# Patient Record
Sex: FEMALE | Race: WHITE | Employment: FULL TIME | URBAN - METROPOLITAN AREA
[De-identification: names, ages, dates, MRNs, and addresses within clinical notes are randomized per-mention and may not be internally consistent; named-entity substitution may affect disease eponyms.]

---

## 2017-01-10 ENCOUNTER — OFFICE VISIT (OUTPATIENT)
Dept: FAMILY MEDICINE CLINIC | Age: 52
End: 2017-01-10

## 2017-01-10 VITALS
TEMPERATURE: 97.9 F | DIASTOLIC BLOOD PRESSURE: 82 MMHG | WEIGHT: 110.4 LBS | BODY MASS INDEX: 19.56 KG/M2 | SYSTOLIC BLOOD PRESSURE: 112 MMHG

## 2017-01-10 DIAGNOSIS — Z01.84 IMMUNITY STATUS TESTING: ICD-10-CM

## 2017-01-10 DIAGNOSIS — R03.0 ELEVATED BLOOD PRESSURE READING WITHOUT DIAGNOSIS OF HYPERTENSION: ICD-10-CM

## 2017-01-10 DIAGNOSIS — Z11.1 PPD SCREENING TEST: ICD-10-CM

## 2017-01-10 DIAGNOSIS — F32.89 DEPRESSIVE DISORDER, NOT ELSEWHERE CLASSIFIED: Primary | ICD-10-CM

## 2017-01-10 LAB
HBV SURFACE AB TITR SER: <3.5 MUL/ML
RUBELLA ANTIBODY IGG: 170.2 IU/ML

## 2017-01-10 PROCEDURE — 86580 TB INTRADERMAL TEST: CPT | Performed by: FAMILY MEDICINE

## 2017-01-10 PROCEDURE — 99214 OFFICE O/P EST MOD 30 MIN: CPT | Performed by: FAMILY MEDICINE

## 2017-01-10 ASSESSMENT — ENCOUNTER SYMPTOMS
EYES NEGATIVE: 1
GASTROINTESTINAL NEGATIVE: 1
RESPIRATORY NEGATIVE: 1

## 2017-01-12 LAB
MUV IGG SER QL: >300 AU/ML
RUBEOLA (MEASLES) AB IGG: 15.1 AU/ML
VZV IGG SER QL IA: 1678 IV

## 2017-01-17 ENCOUNTER — NURSE ONLY (OUTPATIENT)
Dept: FAMILY MEDICINE CLINIC | Age: 52
End: 2017-01-17

## 2017-01-17 DIAGNOSIS — Z11.1 ENCOUNTER FOR PPD TEST: Primary | ICD-10-CM

## 2017-01-17 PROCEDURE — 86580 TB INTRADERMAL TEST: CPT | Performed by: FAMILY MEDICINE

## 2017-01-19 ENCOUNTER — NURSE ONLY (OUTPATIENT)
Dept: FAMILY MEDICINE CLINIC | Age: 52
End: 2017-01-19

## 2017-01-19 DIAGNOSIS — Z11.1 ENCOUNTER FOR PPD SKIN TEST READING: Primary | ICD-10-CM

## 2017-01-31 ENCOUNTER — TELEPHONE (OUTPATIENT)
Dept: FAMILY MEDICINE CLINIC | Age: 52
End: 2017-01-31

## 2017-02-02 ENCOUNTER — NURSE ONLY (OUTPATIENT)
Dept: FAMILY MEDICINE CLINIC | Age: 52
End: 2017-02-02

## 2017-02-02 DIAGNOSIS — Z11.1 VISIT FOR TB SKIN TEST: ICD-10-CM

## 2017-02-02 DIAGNOSIS — Z23 NEED FOR MMR VACCINE: ICD-10-CM

## 2017-02-02 DIAGNOSIS — Z23 NEED FOR HEPATITIS B VACCINATION: Primary | ICD-10-CM

## 2017-02-02 PROCEDURE — 90707 MMR VACCINE SC: CPT | Performed by: FAMILY MEDICINE

## 2017-02-02 PROCEDURE — 90471 IMMUNIZATION ADMIN: CPT | Performed by: FAMILY MEDICINE

## 2017-02-02 PROCEDURE — 90746 HEPB VACCINE 3 DOSE ADULT IM: CPT | Performed by: FAMILY MEDICINE

## 2017-02-02 PROCEDURE — 90472 IMMUNIZATION ADMIN EACH ADD: CPT | Performed by: FAMILY MEDICINE

## 2017-02-02 PROCEDURE — 86580 TB INTRADERMAL TEST: CPT | Performed by: FAMILY MEDICINE

## 2017-02-03 ENCOUNTER — TELEPHONE (OUTPATIENT)
Dept: FAMILY MEDICINE CLINIC | Age: 52
End: 2017-02-03

## 2017-02-21 ENCOUNTER — OFFICE VISIT (OUTPATIENT)
Dept: FAMILY MEDICINE CLINIC | Age: 52
End: 2017-02-21

## 2017-02-21 VITALS
WEIGHT: 112.8 LBS | DIASTOLIC BLOOD PRESSURE: 78 MMHG | SYSTOLIC BLOOD PRESSURE: 136 MMHG | OXYGEN SATURATION: 100 % | HEART RATE: 58 BPM | BODY MASS INDEX: 19.98 KG/M2 | TEMPERATURE: 97.4 F

## 2017-02-21 DIAGNOSIS — Z01.84 IMMUNITY STATUS TESTING: ICD-10-CM

## 2017-02-21 DIAGNOSIS — J01.00 ACUTE NON-RECURRENT MAXILLARY SINUSITIS: Primary | ICD-10-CM

## 2017-02-21 PROCEDURE — 99213 OFFICE O/P EST LOW 20 MIN: CPT | Performed by: FAMILY MEDICINE

## 2017-02-21 RX ORDER — AZITHROMYCIN 250 MG/1
TABLET, FILM COATED ORAL
Qty: 1 PACKET | Refills: 0 | Status: SHIPPED | OUTPATIENT
Start: 2017-02-21 | End: 2018-01-08 | Stop reason: SDUPTHER

## 2017-02-21 RX ORDER — FLUCONAZOLE 150 MG/1
150 TABLET ORAL ONCE
Qty: 2 TABLET | Refills: 0 | Status: SHIPPED | OUTPATIENT
Start: 2017-02-21 | End: 2017-02-21

## 2017-02-21 ASSESSMENT — ENCOUNTER SYMPTOMS
EYES NEGATIVE: 1
RESPIRATORY NEGATIVE: 1
GASTROINTESTINAL NEGATIVE: 1

## 2017-03-02 ENCOUNTER — NURSE ONLY (OUTPATIENT)
Dept: FAMILY MEDICINE CLINIC | Age: 52
End: 2017-03-02

## 2017-03-02 DIAGNOSIS — Z23 NEED FOR HEPATITIS B VACCINATION: ICD-10-CM

## 2017-03-02 DIAGNOSIS — Z23 NEED FOR MMR VACCINE: Primary | ICD-10-CM

## 2017-03-02 PROCEDURE — 90746 HEPB VACCINE 3 DOSE ADULT IM: CPT | Performed by: FAMILY MEDICINE

## 2017-03-02 PROCEDURE — 90707 MMR VACCINE SC: CPT | Performed by: FAMILY MEDICINE

## 2017-03-02 PROCEDURE — 90471 IMMUNIZATION ADMIN: CPT | Performed by: FAMILY MEDICINE

## 2017-03-02 PROCEDURE — 90472 IMMUNIZATION ADMIN EACH ADD: CPT | Performed by: FAMILY MEDICINE

## 2017-03-23 ENCOUNTER — OFFICE VISIT (OUTPATIENT)
Dept: FAMILY MEDICINE CLINIC | Age: 52
End: 2017-03-23

## 2017-03-23 VITALS
SYSTOLIC BLOOD PRESSURE: 110 MMHG | DIASTOLIC BLOOD PRESSURE: 80 MMHG | OXYGEN SATURATION: 100 % | WEIGHT: 113.4 LBS | TEMPERATURE: 98 F | BODY MASS INDEX: 20.09 KG/M2 | HEART RATE: 66 BPM | HEIGHT: 63 IN

## 2017-03-23 DIAGNOSIS — M16.9 OSTEOARTHRITIS OF HIP, UNSPECIFIED LATERALITY, UNSPECIFIED OSTEOARTHRITIS TYPE: ICD-10-CM

## 2017-03-23 DIAGNOSIS — K58.9 IRRITABLE BOWEL SYNDROME WITHOUT DIARRHEA: ICD-10-CM

## 2017-03-23 DIAGNOSIS — H02.403 BLEPHAROPTOSIS, BILATERAL: ICD-10-CM

## 2017-03-23 DIAGNOSIS — M23.303 DERANGEMENT OF MEDIAL MENISCUS, RIGHT: ICD-10-CM

## 2017-03-23 DIAGNOSIS — Z82.49 FAMILY HISTORY OF CORONARY ARTERY DISEASE: ICD-10-CM

## 2017-03-23 DIAGNOSIS — F41.1 ANXIETY STATE: ICD-10-CM

## 2017-03-23 DIAGNOSIS — F32.89 DEPRESSIVE DISORDER, NOT ELSEWHERE CLASSIFIED: ICD-10-CM

## 2017-03-23 DIAGNOSIS — J30.1 NON-SEASONAL ALLERGIC RHINITIS DUE TO POLLEN: ICD-10-CM

## 2017-03-23 DIAGNOSIS — R79.89 ELEVATED HOMOCYSTEINE: ICD-10-CM

## 2017-03-23 DIAGNOSIS — Z01.818 PRE-OPERATIVE GENERAL PHYSICAL EXAMINATION: Primary | ICD-10-CM

## 2017-03-23 PROCEDURE — 99244 OFF/OP CNSLTJ NEW/EST MOD 40: CPT | Performed by: NURSE PRACTITIONER

## 2017-05-25 DIAGNOSIS — F41.1 ANXIETY STATE: ICD-10-CM

## 2017-05-25 RX ORDER — BUPROPION HYDROCHLORIDE 300 MG/1
300 TABLET ORAL EVERY MORNING
Qty: 90 TABLET | Refills: 1 | Status: SHIPPED | OUTPATIENT
Start: 2017-05-25 | End: 2017-11-18 | Stop reason: SDUPTHER

## 2017-06-08 ENCOUNTER — OFFICE VISIT (OUTPATIENT)
Dept: FAMILY MEDICINE CLINIC | Age: 52
End: 2017-06-08

## 2017-06-08 ENCOUNTER — HOSPITAL ENCOUNTER (OUTPATIENT)
Dept: MAMMOGRAPHY | Age: 52
Discharge: HOME | End: 2017-06-08
Attending: FAMILY MEDICINE | Admitting: FAMILY MEDICINE

## 2017-06-08 VITALS
DIASTOLIC BLOOD PRESSURE: 78 MMHG | SYSTOLIC BLOOD PRESSURE: 124 MMHG | TEMPERATURE: 97.9 F | BODY MASS INDEX: 20.02 KG/M2 | WEIGHT: 113 LBS

## 2017-06-08 DIAGNOSIS — Z12.11 SCREEN FOR COLON CANCER: ICD-10-CM

## 2017-06-08 DIAGNOSIS — Z12.31 SCREENING MAMMOGRAM, ENCOUNTER FOR: ICD-10-CM

## 2017-06-08 DIAGNOSIS — F98.8 ADD (ATTENTION DEFICIT DISORDER) WITHOUT HYPERACTIVITY: ICD-10-CM

## 2017-06-08 DIAGNOSIS — F41.1 ANXIETY STATE: Primary | ICD-10-CM

## 2017-06-08 DIAGNOSIS — E78.2 MIXED HYPERLIPIDEMIA: ICD-10-CM

## 2017-06-08 PROCEDURE — 99214 OFFICE O/P EST MOD 30 MIN: CPT | Performed by: FAMILY MEDICINE

## 2017-06-08 ASSESSMENT — ENCOUNTER SYMPTOMS
GASTROINTESTINAL NEGATIVE: 1
EYES NEGATIVE: 1
RESPIRATORY NEGATIVE: 1

## 2017-07-10 DIAGNOSIS — F98.8 ADD (ATTENTION DEFICIT DISORDER) WITHOUT HYPERACTIVITY: ICD-10-CM

## 2017-08-07 DIAGNOSIS — F98.8 ADD (ATTENTION DEFICIT DISORDER) WITHOUT HYPERACTIVITY: ICD-10-CM

## 2017-09-08 ENCOUNTER — OFFICE VISIT (OUTPATIENT)
Dept: FAMILY MEDICINE CLINIC | Age: 52
End: 2017-09-08

## 2017-09-08 VITALS
TEMPERATURE: 97.6 F | WEIGHT: 110.6 LBS | BODY MASS INDEX: 19.6 KG/M2 | HEART RATE: 78 BPM | HEIGHT: 63 IN | SYSTOLIC BLOOD PRESSURE: 124 MMHG | OXYGEN SATURATION: 100 % | DIASTOLIC BLOOD PRESSURE: 84 MMHG

## 2017-09-08 DIAGNOSIS — F98.8 ADD (ATTENTION DEFICIT DISORDER): Primary | ICD-10-CM

## 2017-09-08 PROCEDURE — 99213 OFFICE O/P EST LOW 20 MIN: CPT | Performed by: NURSE PRACTITIONER

## 2017-09-11 PROBLEM — F98.8 ADD (ATTENTION DEFICIT DISORDER): Status: ACTIVE | Noted: 2017-09-11

## 2017-09-11 ASSESSMENT — ENCOUNTER SYMPTOMS
ALLERGIC/IMMUNOLOGIC NEGATIVE: 1
RESPIRATORY NEGATIVE: 1
GASTROINTESTINAL NEGATIVE: 1
EYES NEGATIVE: 1

## 2017-10-09 DIAGNOSIS — F98.8 ADD (ATTENTION DEFICIT DISORDER): ICD-10-CM

## 2017-10-27 ENCOUNTER — TELEPHONE (OUTPATIENT)
Dept: OTHER | Facility: CLINIC | Age: 52
End: 2017-10-27

## 2017-11-18 DIAGNOSIS — F41.1 ANXIETY STATE: ICD-10-CM

## 2017-11-18 RX ORDER — BUPROPION HYDROCHLORIDE 300 MG/1
300 TABLET ORAL EVERY MORNING
Qty: 90 TABLET | Refills: 1 | Status: SHIPPED | OUTPATIENT
Start: 2017-11-18 | End: 2018-05-15 | Stop reason: SDUPTHER

## 2017-12-06 DIAGNOSIS — F98.8 ADD (ATTENTION DEFICIT DISORDER) WITHOUT HYPERACTIVITY: ICD-10-CM

## 2018-01-08 ENCOUNTER — OFFICE VISIT (OUTPATIENT)
Dept: FAMILY MEDICINE CLINIC | Age: 53
End: 2018-01-08

## 2018-01-08 VITALS
WEIGHT: 106.2 LBS | OXYGEN SATURATION: 97 % | BODY MASS INDEX: 18.82 KG/M2 | SYSTOLIC BLOOD PRESSURE: 110 MMHG | HEIGHT: 63 IN | DIASTOLIC BLOOD PRESSURE: 78 MMHG | TEMPERATURE: 97.4 F

## 2018-01-08 DIAGNOSIS — J01.00 ACUTE NON-RECURRENT MAXILLARY SINUSITIS: ICD-10-CM

## 2018-01-08 DIAGNOSIS — F98.8 ATTENTION DEFICIT DISORDER (ADD) WITHOUT HYPERACTIVITY: Primary | ICD-10-CM

## 2018-01-08 RX ORDER — AZITHROMYCIN 250 MG/1
TABLET, FILM COATED ORAL
Qty: 1 PACKET | Refills: 0 | Status: SHIPPED | OUTPATIENT
Start: 2018-01-08 | End: 2018-01-18

## 2018-01-09 ENCOUNTER — HOSPITAL ENCOUNTER (OUTPATIENT)
Dept: VASCULAR LAB | Age: 53
Discharge: HOME | End: 2018-01-09
Attending: NURSE PRACTITIONER | Admitting: NURSE PRACTITIONER

## 2018-01-09 ENCOUNTER — OFFICE VISIT (OUTPATIENT)
Dept: FAMILY MEDICINE CLINIC | Age: 53
End: 2018-01-09

## 2018-01-09 VITALS
OXYGEN SATURATION: 100 % | DIASTOLIC BLOOD PRESSURE: 86 MMHG | SYSTOLIC BLOOD PRESSURE: 138 MMHG | HEIGHT: 63 IN | HEART RATE: 70 BPM | TEMPERATURE: 98.1 F | BODY MASS INDEX: 19.07 KG/M2 | WEIGHT: 107.6 LBS

## 2018-01-09 DIAGNOSIS — M79.605 PAIN OF LEFT LOWER EXTREMITY: ICD-10-CM

## 2018-01-09 DIAGNOSIS — M79.89 LEG SWELLING: ICD-10-CM

## 2018-01-09 DIAGNOSIS — M79.605 PAIN OF LEFT LEG: ICD-10-CM

## 2018-01-09 DIAGNOSIS — M79.89 LEFT LEG SWELLING: Primary | ICD-10-CM

## 2018-01-09 DIAGNOSIS — M79.605 PAIN OF LEFT LOWER EXTREMITY: Primary | ICD-10-CM

## 2018-01-09 LAB
BASOPHILS ABSOLUTE: 0 K/UL (ref 0–0.2)
BASOPHILS RELATIVE PERCENT: 0.8 %
EOSINOPHILS ABSOLUTE: 0.1 K/UL (ref 0–0.6)
EOSINOPHILS RELATIVE PERCENT: 3.2 %
HCT VFR BLD CALC: 34.4 % (ref 36–48)
HEMOGLOBIN: 11.8 G/DL (ref 12–16)
LYMPHOCYTES ABSOLUTE: 1.3 K/UL (ref 1–5.1)
LYMPHOCYTES RELATIVE PERCENT: 35.1 %
MCH RBC QN AUTO: 34.4 PG (ref 26–34)
MCHC RBC AUTO-ENTMCNC: 34.3 G/DL (ref 31–36)
MCV RBC AUTO: 100.5 FL (ref 80–100)
MONOCYTES ABSOLUTE: 0.3 K/UL (ref 0–1.3)
MONOCYTES RELATIVE PERCENT: 9 %
NEUTROPHILS ABSOLUTE: 2 K/UL (ref 1.7–7.7)
NEUTROPHILS RELATIVE PERCENT: 51.9 %
PDW BLD-RTO: 12.9 % (ref 12.4–15.4)
PLATELET # BLD: 172 K/UL (ref 135–450)
PMV BLD AUTO: 10.4 FL (ref 5–10.5)
RBC # BLD: 3.43 M/UL (ref 4–5.2)
SEDIMENTATION RATE, ERYTHROCYTE: 8 MM/HR (ref 0–30)
WBC # BLD: 3.8 K/UL (ref 4–11)

## 2018-01-09 ASSESSMENT — ENCOUNTER SYMPTOMS
COUGH: 0
HOARSE VOICE: 0
SINUS PRESSURE: 1
EYES NEGATIVE: 1
RESPIRATORY NEGATIVE: 1
SHORTNESS OF BREATH: 0
GASTROINTESTINAL NEGATIVE: 1
SINUS PAIN: 1
ALLERGIC/IMMUNOLOGIC NEGATIVE: 1
SWOLLEN GLANDS: 1
SINUS COMPLAINT: 1

## 2018-01-09 NOTE — PROGRESS NOTES
1/9/18    Natali Nascimento  1965      Chief Complaint   Patient presents with    ADD     med refill     Sinus Problem     L-side of her throat hurts x 3 days        Vitals:    01/08/18 1105   BP: 110/78   Temp: 97.4 °F (36.3 °C)   SpO2: 97%         Immunization History   Administered Date(s) Administered    Hepatitis B (Engerix-B) 03/02/2017    Hepatitis B (Recombivax HB) 02/02/2017    Influenza Virus Vaccine 10/10/2008, 10/11/2010, 11/07/2011, 10/26/2012, 10/21/2013, 09/25/2014, 09/14/2015    Influenza, Quadv, 6-35 months, IM, Preservative Free 11/18/2016    MMR 02/02/2017, 03/02/2017    PPD Test 01/10/2017, 01/17/2017, 02/02/2017    Tdap (Boostrix, Adacel) 11/30/2011       Allergies   Allergen Reactions    Codeine     Phenergan [Promethazine Hcl]     Sulfa Antibiotics     Promethazine Other (See Comments)     CONVULSIONS    Sulfamethoxazole-Trimethoprim Hives     Outpatient Prescriptions Marked as Taking for the 1/8/18 encounter (Office Visit) with Caryle Flemings, NP   Medication Sig Dispense Refill    azithromycin (ZITHROMAX Z-GREGORY) 250 MG tablet Take as per the instructions on the packet. 1 packet 0    Lisdexamfetamine Dimesylate (VYVANSE) 40 MG CAPS 1 po daily. 30 capsule 0    buPROPion (WELLBUTRIN XL) 300 MG extended release tablet Take 1 tablet by mouth every morning 90 tablet 1    multivitamin (THERAGRAN) per tablet Take 1 tablet by mouth daily.          Past Medical History:   Diagnosis Date    Allergic rhinitis 8/28/2010    Anisocoria     Anxiety state 8/28/2010    Chickenpox     Depressive disorder, not elsewhere classified 3/3/2008    Deviated septum     DJD (degenerative joint disease) of hip     Elevated homocysteine (HCC) 1/30/2016    Herpes simplex virus (HSV) infection 8/28/2010    Irritable bowel syndrome 8/28/2010    Osteoarthritis of hip      replace inactive diagnosis    Recurrent cold sores      Past Surgical History:   Procedure Laterality Date    1. Attention deficit disorder (ADD) without hyperactivity  Stable on current therapy, will monitor   Lisdexamfetamine Dimesylate (VYVANSE) 40 MG CAPS   2. Acute non-recurrent maxillary sinusitis  The condition is deteriorating, will change treatment, investigate cause and make further recommendations when data back.    azithromycin (ZITHROMAX Z-GREGORY) 250 MG tablet             Yessenia Tinsley, CASSIE

## 2018-01-10 ENCOUNTER — TELEPHONE (OUTPATIENT)
Dept: FAMILY MEDICINE CLINIC | Age: 53
End: 2018-01-10

## 2018-01-10 DIAGNOSIS — D64.9 ANEMIA, UNSPECIFIED TYPE: Primary | ICD-10-CM

## 2018-01-10 ASSESSMENT — ENCOUNTER SYMPTOMS
GASTROINTESTINAL NEGATIVE: 1
EYES NEGATIVE: 1
RESPIRATORY NEGATIVE: 1
ALLERGIC/IMMUNOLOGIC NEGATIVE: 1

## 2018-01-11 NOTE — TELEPHONE ENCOUNTER
I spoke with pt - she is going to come to the lab tomorrow morning to have additional blood work drawn.

## 2018-01-12 DIAGNOSIS — D64.9 ANEMIA, UNSPECIFIED TYPE: ICD-10-CM

## 2018-01-12 LAB
FERRITIN: 62.4 NG/ML (ref 15–150)
FOLATE: >20 NG/ML (ref 4.78–24.2)
IRON SATURATION: 33 % (ref 15–50)
IRON: 104 UG/DL (ref 37–145)
TOTAL IRON BINDING CAPACITY: 317 UG/DL (ref 260–445)
VITAMIN B-12: 996 PG/ML (ref 211–911)

## 2018-02-08 DIAGNOSIS — F98.8 ATTENTION DEFICIT DISORDER (ADD) WITHOUT HYPERACTIVITY: ICD-10-CM

## 2018-03-08 DIAGNOSIS — F98.8 ATTENTION DEFICIT DISORDER (ADD) WITHOUT HYPERACTIVITY: ICD-10-CM

## 2018-03-28 ENCOUNTER — OFFICE VISIT (OUTPATIENT)
Dept: FAMILY MEDICINE CLINIC | Age: 53
End: 2018-03-28

## 2018-03-28 VITALS
DIASTOLIC BLOOD PRESSURE: 92 MMHG | TEMPERATURE: 98.7 F | WEIGHT: 106.2 LBS | SYSTOLIC BLOOD PRESSURE: 154 MMHG | BODY MASS INDEX: 18.81 KG/M2

## 2018-03-28 DIAGNOSIS — R20.8 BURNING SENSATION: ICD-10-CM

## 2018-03-28 DIAGNOSIS — F98.8 ATTENTION DEFICIT DISORDER (ADD) WITHOUT HYPERACTIVITY: ICD-10-CM

## 2018-03-28 DIAGNOSIS — J20.8 ACUTE BRONCHITIS DUE TO OTHER SPECIFIED ORGANISMS: Primary | ICD-10-CM

## 2018-03-28 DIAGNOSIS — D48.5 NEOPLASM OF UNCERTAIN BEHAVIOR OF SKIN: ICD-10-CM

## 2018-03-28 PROCEDURE — 3017F COLORECTAL CA SCREEN DOC REV: CPT | Performed by: FAMILY MEDICINE

## 2018-03-28 PROCEDURE — G8484 FLU IMMUNIZE NO ADMIN: HCPCS | Performed by: FAMILY MEDICINE

## 2018-03-28 PROCEDURE — G8420 CALC BMI NORM PARAMETERS: HCPCS | Performed by: FAMILY MEDICINE

## 2018-03-28 PROCEDURE — G8427 DOCREV CUR MEDS BY ELIG CLIN: HCPCS | Performed by: FAMILY MEDICINE

## 2018-03-28 PROCEDURE — 17000 DESTRUCT PREMALG LESION: CPT | Performed by: FAMILY MEDICINE

## 2018-03-28 PROCEDURE — 3014F SCREEN MAMMO DOC REV: CPT | Performed by: FAMILY MEDICINE

## 2018-03-28 PROCEDURE — 99214 OFFICE O/P EST MOD 30 MIN: CPT | Performed by: FAMILY MEDICINE

## 2018-03-28 PROCEDURE — 1036F TOBACCO NON-USER: CPT | Performed by: FAMILY MEDICINE

## 2018-03-28 RX ORDER — DOXYCYCLINE HYCLATE 100 MG
100 TABLET ORAL 2 TIMES DAILY
Qty: 20 TABLET | Refills: 0 | Status: SHIPPED | OUTPATIENT
Start: 2018-03-28 | End: 2018-04-07

## 2018-03-28 RX ORDER — FLUCONAZOLE 150 MG/1
150 TABLET ORAL ONCE
Qty: 2 TABLET | Refills: 0 | Status: SHIPPED | OUTPATIENT
Start: 2018-03-28 | End: 2018-03-28

## 2018-03-28 ASSESSMENT — ENCOUNTER SYMPTOMS
GASTROINTESTINAL NEGATIVE: 1
EYES NEGATIVE: 1
RESPIRATORY NEGATIVE: 1

## 2018-03-28 NOTE — PROGRESS NOTES
3/28/18    Durga Almonte  1965      Chief Complaint   Patient presents with    Pharyngitis     sore throat, coughing up chunks, sinus pressure, started saturday, taking otc meds    Mole     has mole on right leg she wants froze off   Upper Respiratory Infection: Patient complains of symptoms of a URI. Symptoms include . Onset of symptoms was   ago, gradually worsening since that time. She also c/o congestion, low grade fever, nasal congestion, post nasal drip, productive cough with  yellow colored sputum and purulent nasal discharge for the past 6 day . She is drinking plenty of fluids. Evaluation to date: none. Treatment to date: oral decongestant, OTC cough suppressant, Acetaminophen, NSAID, which has been  not very effective. Skin Lesion: Patient complains of a skin lesion of the lower extremity. The lesion has been present for several months. Lesion has changed in a few weeks. Symptoms associated with the lesion are: bleeding, pain, none. Patient denies tendency to be traumatized, none. ADD/ADHD:  Current treatment: , which has been effective. Residual symptoms: none. Medication side effects: None. Patient denies None, anorexia, palpitations, insomnia, irritability and anxiety.       BP (!) 154/92   Temp 98.7 °F (37.1 °C) (Oral)   Wt 106 lb 3.2 oz (48.2 kg)   BMI 18.81 kg/m²       Immunization History   Administered Date(s) Administered    Hepatitis B (Engerix-B) 03/02/2017    Hepatitis B (Recombivax HB) 02/02/2017    Influenza Virus Vaccine 10/10/2008, 10/11/2010, 11/07/2011, 10/26/2012, 10/21/2013, 09/25/2014, 09/14/2015    Influenza, Quadv, 6-35 months, IM, Preservative Free 11/18/2016    MMR 02/02/2017, 03/02/2017    PPD Test 01/10/2017, 01/17/2017, 02/02/2017    Tdap (Boostrix, Adacel) 11/30/2011       Allergies   Allergen Reactions    Codeine     Phenergan [Promethazine Hcl]     Sulfa Antibiotics     Promethazine Other (See Comments)     CONVULSIONS    Pressure Brother     Heart Disease Brother     Early Death Brother     Substance Abuse Brother     Diabetes Maternal Grandfather     Heart Disease Maternal Grandfather     Stroke Maternal Grandfather     Stroke Paternal Grandmother     Diabetes Paternal Grandmother     Arthritis Paternal Grandmother     Heart Disease Paternal Grandmother     High Blood Pressure Sister     Depression Sister     Diabetes Sister     High Blood Pressure Brother     High Blood Pressure Brother      Social History     Social History    Marital status:      Spouse name: N/A    Number of children: N/A    Years of education: N/A     Occupational History    Not on file. Social History Main Topics    Smoking status: Never Smoker    Smokeless tobacco: Never Used    Alcohol use Yes    Drug use: No    Sexual activity: Yes     Other Topics Concern    Not on file     Social History Narrative    No narrative on file         Any recent diagnostic tests, hospital reports, office notes or consultation letters were reviewed prior to and during the visit. Review of Systems   Constitutional: Negative. HENT: Negative. Eyes: Negative. Respiratory: Negative. Cardiovascular: Negative. Gastrointestinal: Negative. Genitourinary: Negative. Musculoskeletal: Negative. Psychiatric/Behavioral: Negative. Physical Exam   Constitutional: She appears well-developed and well-nourished. She appears distressed. HENT:   Head: Normocephalic and atraumatic. Right Ear: Hearing, tympanic membrane, external ear and ear canal normal.   Left Ear: Hearing, tympanic membrane, external ear and ear canal normal.   Nose: Mucosal edema and rhinorrhea present. Mouth/Throat: Uvula is midline, oropharynx is clear and moist and mucous membranes are normal.   Eyes: Conjunctivae and EOM are normal. Pupils are equal, round, and reactive to light. Right eye exhibits no discharge. Left eye exhibits no discharge.  No scleral icterus. Neck: Trachea normal and normal range of motion. Neck supple. Normal carotid pulses, no hepatojugular reflux and no JVD present. Carotid bruit is not present. No tracheal deviation present. No thyromegaly present. Cardiovascular: Normal rate, regular rhythm, S2 normal, normal heart sounds and intact distal pulses. PMI is not displaced. Exam reveals no gallop and no friction rub. No murmur heard. Pulses:       Carotid pulses are 2+ on the right side, and 2+ on the left side. Dorsalis pedis pulses are 2+ on the right side, and 2+ on the left side. Posterior tibial pulses are 2+ on the right side, and 2+ on the left side. Pulmonary/Chest: Effort normal. No stridor. No respiratory distress. She has no decreased breath sounds. She has no wheezes. She has rhonchi in the left middle field. She has no rales. She exhibits no tenderness. Abdominal: Soft. Bowel sounds are normal. She exhibits no distension and no mass. There is no hepatosplenomegaly. There is no tenderness. There is no rebound and no guarding. No hernia. Musculoskeletal:        Right ankle: She exhibits no swelling. Left ankle: She exhibits no swelling. Lymphadenopathy:     She has no cervical adenopathy. Skin: She is not diaphoretic. Psychiatric: She has a normal mood and affect. Her behavior is normal. Judgment and thought content normal.      The lesion(s) was/were  identified and frozen three times using standard technique and getting adequate frost zone. Teaching done as to expected after treatment course. No complications occurred. 1. Acute bronchitis due to other specified organisms  The condition is deteriorating, will change treatment, investigate cause and make further recommendations when data back. Will treat     2. Neoplasm of uncertain behavior of skin  The condition is deteriorating, will change treatment, investigate cause and make further recommendations when data back.  Will

## 2018-04-09 DIAGNOSIS — F98.8 ATTENTION DEFICIT DISORDER (ADD) WITHOUT HYPERACTIVITY: ICD-10-CM

## 2018-05-11 ENCOUNTER — TELEPHONE (OUTPATIENT)
Dept: FAMILY MEDICINE CLINIC | Age: 53
End: 2018-05-11

## 2018-05-11 DIAGNOSIS — F98.8 ATTENTION DEFICIT DISORDER (ADD) WITHOUT HYPERACTIVITY: ICD-10-CM

## 2018-05-15 DIAGNOSIS — F41.1 ANXIETY STATE: ICD-10-CM

## 2018-05-15 RX ORDER — BUPROPION HYDROCHLORIDE 300 MG/1
300 TABLET ORAL EVERY MORNING
Qty: 90 TABLET | Refills: 1 | Status: SHIPPED | OUTPATIENT
Start: 2018-05-15 | End: 2019-03-01 | Stop reason: SDUPTHER

## 2018-06-05 ENCOUNTER — TELEPHONE (OUTPATIENT)
Dept: SURGERY | Age: 53
End: 2018-06-05

## 2018-06-05 ENCOUNTER — OFFICE VISIT (OUTPATIENT)
Dept: FAMILY MEDICINE CLINIC | Age: 53
End: 2018-06-05

## 2018-06-05 VITALS
HEIGHT: 63 IN | SYSTOLIC BLOOD PRESSURE: 124 MMHG | TEMPERATURE: 98.3 F | OXYGEN SATURATION: 100 % | DIASTOLIC BLOOD PRESSURE: 82 MMHG | BODY MASS INDEX: 19.95 KG/M2 | HEART RATE: 60 BPM | WEIGHT: 112.6 LBS

## 2018-06-05 DIAGNOSIS — Z11.1 PPD SCREENING TEST: ICD-10-CM

## 2018-06-05 DIAGNOSIS — Z12.11 ENCOUNTER FOR SCREENING COLONOSCOPY: ICD-10-CM

## 2018-06-05 DIAGNOSIS — B00.9 HERPES SIMPLEX VIRUS (HSV) INFECTION: ICD-10-CM

## 2018-06-05 DIAGNOSIS — J30.89 CHRONIC NONSEASONAL ALLERGIC RHINITIS DUE TO POLLEN: ICD-10-CM

## 2018-06-05 DIAGNOSIS — F98.8 ATTENTION DEFICIT DISORDER (ADD) WITHOUT HYPERACTIVITY: ICD-10-CM

## 2018-06-05 DIAGNOSIS — K58.9 IRRITABLE BOWEL SYNDROME WITHOUT DIARRHEA: ICD-10-CM

## 2018-06-05 DIAGNOSIS — Z01.84 IMMUNITY STATUS TESTING: ICD-10-CM

## 2018-06-05 DIAGNOSIS — F41.1 ANXIETY STATE: ICD-10-CM

## 2018-06-05 DIAGNOSIS — Z00.00 ROUTINE GENERAL MEDICAL EXAMINATION AT A HEALTH CARE FACILITY: Primary | ICD-10-CM

## 2018-06-05 DIAGNOSIS — R79.89 ELEVATED HOMOCYSTEINE: ICD-10-CM

## 2018-06-05 LAB
BILIRUBIN, POC: NORMAL
BLOOD URINE, POC: NORMAL
CLARITY, POC: CLEAR
COLOR, POC: YELLOW
GLUCOSE URINE, POC: NORMAL
KETONES, POC: NORMAL
LEUKOCYTE EST, POC: NORMAL
NITRITE, POC: NORMAL
PH, POC: 7
PROTEIN, POC: NORMAL
SPECIFIC GRAVITY, POC: 1.01
UROBILINOGEN, POC: 0.2

## 2018-06-05 PROCEDURE — 86580 TB INTRADERMAL TEST: CPT | Performed by: FAMILY MEDICINE

## 2018-06-05 PROCEDURE — 99396 PREV VISIT EST AGE 40-64: CPT | Performed by: FAMILY MEDICINE

## 2018-06-05 PROCEDURE — 81002 URINALYSIS NONAUTO W/O SCOPE: CPT | Performed by: FAMILY MEDICINE

## 2018-06-05 ASSESSMENT — ENCOUNTER SYMPTOMS
RESPIRATORY NEGATIVE: 1
EYES NEGATIVE: 1
GASTROINTESTINAL NEGATIVE: 1
ALLERGIC/IMMUNOLOGIC NEGATIVE: 1

## 2018-06-05 ASSESSMENT — PATIENT HEALTH QUESTIONNAIRE - PHQ9
SUM OF ALL RESPONSES TO PHQ9 QUESTIONS 1 & 2: 0
1. LITTLE INTEREST OR PLEASURE IN DOING THINGS: 0
SUM OF ALL RESPONSES TO PHQ QUESTIONS 1-9: 0
2. FEELING DOWN, DEPRESSED OR HOPELESS: 0

## 2018-06-11 DIAGNOSIS — F98.8 ATTENTION DEFICIT DISORDER (ADD) WITHOUT HYPERACTIVITY: ICD-10-CM

## 2018-07-09 ENCOUNTER — TELEPHONE (OUTPATIENT)
Dept: FAMILY MEDICINE CLINIC | Age: 53
End: 2018-07-09

## 2018-07-09 DIAGNOSIS — F98.8 ATTENTION DEFICIT DISORDER (ADD) WITHOUT HYPERACTIVITY: ICD-10-CM

## 2018-07-09 RX ORDER — PHENAZOPYRIDINE HYDROCHLORIDE 200 MG/1
200 TABLET, FILM COATED ORAL 3 TIMES DAILY PRN
Qty: 9 TABLET | Refills: 0 | OUTPATIENT
Start: 2018-07-09 | End: 2018-08-13 | Stop reason: ALTCHOICE

## 2018-07-09 RX ORDER — NITROFURANTOIN 25; 75 MG/1; MG/1
100 CAPSULE ORAL 2 TIMES DAILY
Qty: 10 CAPSULE | Refills: 0 | OUTPATIENT
Start: 2018-07-09 | End: 2019-05-31 | Stop reason: SDUPTHER

## 2018-07-09 NOTE — TELEPHONE ENCOUNTER
1 order pending  LOV 6/5/18  Last Fill 6/11/18    Pt will be calling numbers provided to set up new pt appt for after PCPs correction.   Thanks

## 2018-08-02 NOTE — PROGRESS NOTES
have a Limitation of Treatment order in effect during my hospitalization, the order may or may not be in effect during this procedure. I give my doctor permission to give me blood or blood products. I understand that there are risks with receiving blood such as hepatitis, AIDS, fever, or allergic reaction. I acknowledge that the risks, benefits, and alternatives of this treatment have been explained to me and that no express or implied warranty has been given by the hospital, any blood bank, or any person or entity as to the blood or blood components transfused. At the discretion of my doctor, I agree to allow observers, equipment/product representatives and allow photographing, and/or televising of the procedure, provided my name or identity is maintained confidentially. I agree the hospital may dispose of or use for scientific or educational purposes any tissue, fluid, or body parts which may be removed.     ________________________________Date________Time______ am/pm  ([place] [facility])  Patient or Signature of Closest Relative or Legal Guardian    ________________________________Date________Time______am/pm      Page 1 of  1  Witness
How Severe Is Your Acne?: moderate
Is This A New Presentation, Or A Follow-Up?: Acne

## 2018-08-10 DIAGNOSIS — Z00.00 ROUTINE GENERAL MEDICAL EXAMINATION AT A HEALTH CARE FACILITY: ICD-10-CM

## 2018-08-10 DIAGNOSIS — R79.89 ELEVATED HOMOCYSTEINE: ICD-10-CM

## 2018-08-10 DIAGNOSIS — Z01.84 IMMUNITY STATUS TESTING: ICD-10-CM

## 2018-08-10 LAB
A/G RATIO: 2.1 (ref 1.1–2.2)
ALBUMIN SERPL-MCNC: 4.9 G/DL (ref 3.4–5)
ALP BLD-CCNC: 71 U/L (ref 40–129)
ALT SERPL-CCNC: 21 U/L (ref 10–40)
ANION GAP SERPL CALCULATED.3IONS-SCNC: 12 MMOL/L (ref 3–16)
AST SERPL-CCNC: 30 U/L (ref 15–37)
BASOPHILS ABSOLUTE: 0 K/UL (ref 0–0.2)
BASOPHILS RELATIVE PERCENT: 1.4 %
BILIRUB SERPL-MCNC: 0.6 MG/DL (ref 0–1)
BUN BLDV-MCNC: 17 MG/DL (ref 7–20)
CALCIUM SERPL-MCNC: 10 MG/DL (ref 8.3–10.6)
CHLORIDE BLD-SCNC: 103 MMOL/L (ref 99–110)
CHOLESTEROL, TOTAL: 188 MG/DL (ref 0–199)
CO2: 24 MMOL/L (ref 21–32)
CREAT SERPL-MCNC: 1 MG/DL (ref 0.6–1.1)
EOSINOPHILS ABSOLUTE: 0.1 K/UL (ref 0–0.6)
EOSINOPHILS RELATIVE PERCENT: 1.5 %
GFR AFRICAN AMERICAN: >60
GFR NON-AFRICAN AMERICAN: 58
GLOBULIN: 2.3 G/DL
GLUCOSE BLD-MCNC: 89 MG/DL (ref 70–99)
HCT VFR BLD CALC: 36.7 % (ref 36–48)
HDLC SERPL-MCNC: 98 MG/DL (ref 40–60)
HEMOGLOBIN: 12.6 G/DL (ref 12–16)
HOMOCYSTEINE: 10 UMOL/L (ref 0–10)
LDL CHOLESTEROL CALCULATED: 82 MG/DL
LYMPHOCYTES ABSOLUTE: 1.6 K/UL (ref 1–5.1)
LYMPHOCYTES RELATIVE PERCENT: 43.1 %
MCH RBC QN AUTO: 34.2 PG (ref 26–34)
MCHC RBC AUTO-ENTMCNC: 34.3 G/DL (ref 31–36)
MCV RBC AUTO: 99.6 FL (ref 80–100)
MONOCYTES ABSOLUTE: 0.3 K/UL (ref 0–1.3)
MONOCYTES RELATIVE PERCENT: 7 %
NEUTROPHILS ABSOLUTE: 1.7 K/UL (ref 1.7–7.7)
NEUTROPHILS RELATIVE PERCENT: 47 %
PDW BLD-RTO: 12.2 % (ref 12.4–15.4)
PLATELET # BLD: 181 K/UL (ref 135–450)
PMV BLD AUTO: 10.1 FL (ref 5–10.5)
POTASSIUM SERPL-SCNC: 3.9 MMOL/L (ref 3.5–5.1)
RBC # BLD: 3.68 M/UL (ref 4–5.2)
SODIUM BLD-SCNC: 139 MMOL/L (ref 136–145)
TOTAL PROTEIN: 7.2 G/DL (ref 6.4–8.2)
TRIGL SERPL-MCNC: 39 MG/DL (ref 0–150)
TSH SERPL DL<=0.05 MIU/L-ACNC: 3.82 UIU/ML (ref 0.27–4.2)
VLDLC SERPL CALC-MCNC: 8 MG/DL
WBC # BLD: 3.6 K/UL (ref 4–11)

## 2018-08-11 LAB — HBV SURFACE AB TITR SER: 23.65 MIU/ML

## 2018-08-13 ENCOUNTER — OFFICE VISIT (OUTPATIENT)
Dept: FAMILY MEDICINE CLINIC | Age: 53
End: 2018-08-13

## 2018-08-13 VITALS
BODY MASS INDEX: 19.24 KG/M2 | DIASTOLIC BLOOD PRESSURE: 72 MMHG | HEART RATE: 50 BPM | OXYGEN SATURATION: 98 % | WEIGHT: 108.6 LBS | SYSTOLIC BLOOD PRESSURE: 130 MMHG | HEIGHT: 63 IN

## 2018-08-13 DIAGNOSIS — M25.562 CHRONIC PAIN OF LEFT KNEE: ICD-10-CM

## 2018-08-13 DIAGNOSIS — F98.8 ATTENTION DEFICIT DISORDER (ADD) WITHOUT HYPERACTIVITY: Primary | ICD-10-CM

## 2018-08-13 DIAGNOSIS — G89.29 CHRONIC PAIN OF LEFT KNEE: ICD-10-CM

## 2018-08-13 DIAGNOSIS — F41.1 ANXIETY STATE: ICD-10-CM

## 2018-08-13 DIAGNOSIS — F33.42 RECURRENT MAJOR DEPRESSIVE DISORDER, IN FULL REMISSION (HCC): ICD-10-CM

## 2018-08-13 DIAGNOSIS — Z00.00 HEALTHCARE MAINTENANCE: ICD-10-CM

## 2018-08-13 PROCEDURE — G8420 CALC BMI NORM PARAMETERS: HCPCS | Performed by: FAMILY MEDICINE

## 2018-08-13 PROCEDURE — 90750 HZV VACC RECOMBINANT IM: CPT | Performed by: FAMILY MEDICINE

## 2018-08-13 PROCEDURE — G8427 DOCREV CUR MEDS BY ELIG CLIN: HCPCS | Performed by: FAMILY MEDICINE

## 2018-08-13 PROCEDURE — 1036F TOBACCO NON-USER: CPT | Performed by: FAMILY MEDICINE

## 2018-08-13 PROCEDURE — 99214 OFFICE O/P EST MOD 30 MIN: CPT | Performed by: FAMILY MEDICINE

## 2018-08-13 PROCEDURE — 90471 IMMUNIZATION ADMIN: CPT | Performed by: FAMILY MEDICINE

## 2018-08-13 PROCEDURE — 3017F COLORECTAL CA SCREEN DOC REV: CPT | Performed by: FAMILY MEDICINE

## 2018-08-13 RX ORDER — NAPROXEN 500 MG/1
TABLET ORAL
Refills: 2 | COMMUNITY
Start: 2018-08-02 | End: 2019-11-12 | Stop reason: ALTCHOICE

## 2018-08-13 NOTE — PROGRESS NOTES
distress. Neurological: She is alert. Skin: Skin is warm and dry. Psychiatric: She has a normal mood and affect. Her behavior is normal.         Assessment/Plan     1. Attention deficit disorder (ADD) without hyperactivity  Discussed options. Given the option to follow up for an ADD evaluation follow-up with his psychiatrist her continue the current regimen. Patient is uncertain what she would like to do. Given referral so that she can pursue some of these if she would like to. In the meantime we'll continue the Vyvanse. Risks of Vyvanse discussed. The patient was seeing previous PCP q 3 mo for refills. Controlled Substances Monitoring:     RX Monitoring 8/13/2018   Attestation The Prescription Monitoring Report for this patient was reviewed today. Documentation Possible medication side effects, risk of tolerance/dependence & alternative treatments discussed. ;No signs of potential drug abuse or diversion identified. - Ambulatory referral to Psychology  - Ambulatory referral to Psychiatry  - Drug Panel-PM-HI Res-UR Interp-A; Future    2. Anxiety state  Stable. Continue current regimen. 3. Recurrent major depressive disorder, in full remission ([facility] [address]  Stable. Continue current regimen. [address] [address] [place], [place] ([facility])  - COLONOSCOPY (Screening)  - [facility])    5. Chronic pain of left knee  Pt has surgery planned. Discussed medications with patient, who voiced understanding of their use and indications. All questions answered. Return in about 3 months (around 11/13/2018) for Medication Refill.

## 2018-08-13 NOTE — PATIENT INSTRUCTIONS
Ways to Prevent Osteoporosis     What Is Osteoporosis? Osteoporosis is a disease of the bones in which the bones become so weak that they break easily. Bones are made up of living tissue that is constantly being renewed. This process, called remodeling, consists of two stagesbone resorption and bone formation. During resorption, old bone is broken down and removed. During bone formation, new bone is built to replace the old. The remodeling process changes naturally throughout the life cycle. During childhood and early adulthood, new bone is formed faster than old bone is removed. Between ages 22 and 28, a peak bone mass (maximum density and strength) is reached. After this, bone loss starts to occur more than bone formation. In women, the rate of loss is greatest during the years after menopause . Osteoporosis occurs when there is an excessive amount of bone loss and/or insufficient bone formation. The bones become thin and weak, increasing the chance of fractures . Fractures are most common in the hip, spine, wrist, and ribs, but can occur in any bone. These fractures can result in trouble walking, severe pain, loss of height, spinal deformities, and decreased function. Because bone loss occurs without symptoms, people may not realize they have osteoporosis until a sudden bump or fall causes a fracture. Bone density tests may help predict who is at the greatest risk. Prevention at All Ages   Since this condition occurs mainly in older people, why should you be concerned about it during earlier years? Prevention of osteoporosis can begin in childhood, when bone mass is increasing. Diet, exercise, smoking , and use of alcohol and caffeine all affect bone formation throughout life. Preventive measures are also important when bone mass is decreasing, during midlife and just after menopause in women.    Calcium and Vitamin D   Good nutrition, especially an adequate supply of calcium , plays an important part in osteoporosis. If you are unaccustomed to exercising, talk to your doctor before you begin. Other Lifestyle Factors   Smoking, and alcohol can contribute to bone loss. To reduce your risk, do not smoke, and limit your use of alcohol. Smoking is a very serious risk factor for osteoporosis and should be avoided by anyone seeking to reduce the risk of bone thinning. Also being underweight can increase your risk for osteoporosis   Medications for Prevention and Treatment   For women who are postmenopausal, there are medicines available to prevent osteoporosis. Some examples include:   · Bisphosphonates, like alendronate (Fosamax)   · Estrogen with progestin (hormones)   · Raloxifene (Evista)   Your Prevention Strategy   There is general agreement that everyone can reduce their risk of developing osteoporosis by making lifestyle changes. A healthy diet and regular exercise are important throughout your life. Avoiding smoking and limiting alcohol use are two of the most important prevention strategies you can adopt. Women from midlife on, older men, and anyone else at increased risk for osteoporosis should evaluate their risk factors in order to develop a prevention strategy. How you implement lifestyle changes and whether you take medicines are decisions that should be made by you and your doctor.      Last Reviewed: February 2011 Ebony [place] MD   Updated: 2/17/2011

## 2018-08-13 NOTE — LETTER
MEDICATION AGREEMENT     Danilo Navarro  [birth year]      For certain conditions, multiple classes of medications may be used to help better manage your symptoms, and to improve your ability to function at home, work and in social settings. However, these medications do have risks, which will be discussed with you, including addiction and dependency. The following prescribed medications need frequent monitoring and will require you to partner and assist in your healthcare. Medication  Dose, instructions and quantity as indicated on current prescription bottle Diagnosis/Reason(s) for Taking Category     Lisdexamfetamine Dimesylate (VYVANSE) 40 MG CAPS                                  Benefits and goals of Controlled Substance Medications: There are two potential goals for your treatment: (1) decreased pain and suffering (2) improved daily life functions. There are many possible treatments for your chronic condition(s), and, in addition to controlled substance medications, we will try alternatives such as physical therapy, yoga, massage, home daily exercise, meditation, relaxation techniques, injections, chiropractic manipulations, surgery, cognitive therapy, hypnosis and many medications that are not habit-forming. Use of controlled substance medications may be helpful, but they are unlikely to resolve all of your symptoms or restore all function. Risks of Controlled Substance Medications:    Opioid pain medications: These medications can lead to problems such as addiction/dependence, sedation, lightheadedness/dizziness, memory issues, falls, constipation, nausea, or vomiting. They may also impair the ability to drive or operate machinery. Additionally, these medications may lower testosterone levels, leading to loss of bone strength, stamina and sex drive.   They may cause problems with breathing, sleep apnea and reduced coughing, which are especially dangerous for patients with lung disease. Overdose or dangerous interactions with alcohol and other medications may occur, leading to death. Hyperalgesia may develop, in which patients receiving opioids for the treatment of pain may actually become more sensitive to certain painful stimuli, and in some cases, experience pain from ordinarily non-painful stimuli. Women between the ages of 14-53 who could become pregnant should carefully weigh the risks and benefits of opioids with their physicians, as these medications increase the risk of pregnancy complications, including miscarriage,  delivery and stillbirth. It is also possible for babies to be born addicted to opioids. Opioid dependence withdrawal symptoms may include; feelings of uneasiness, increased pain, irritability, belly pain, diarrhea, sweats and goose-flesh. Benzodiazepines and non-benzodiazepine sleep medications: These medications can lead to problems such as addiction/dependence, sedation, fatigue, lightheadedness, dizziness, incoordination, falls, depression, hallucinations, and impaired judgment, memory and concentration. The ability to drive and operate machinery may also be affected. Abnormal sleep-related behaviors have been reported, including sleep walking, driving, making telephone calls, eating, or having sex while not fully awake. These medications can suppress breathing and worsen sleep apnea, particularly when combined with alcohol or other sedating medications, potentially leading to death. Dependence withdrawal symptoms may include tremors, anxiety, hallucinations and seizures. Stimulants:  Common adverse effects include addiction/dependence, increased blood pressure and heart rate, decreased appetite, nausea, involuntary weight loss, insomnia, irritability, and headaches.   These risks may increase when these medications are combined with other stimulants, such as caffeine pills or energy drinks, certain weight loss supplements and oral decongestants. Dependence withdrawal symptoms may include depressed mood, loss of interest, suicidal thoughts, anxiety, fatigue, appetite changes and agitation. Testosterone replacement therapy:  Potential side effects include increased risk of stroke and heart attack, blood clots, increased blood pressure, increased cholesterol, enlarged prostate, sleep apnea, irritability/aggression and other mood disorders, and decreased fertility. Other:     1. I understand that I have the following responsibilities:  · I will take medications at the dose and frequency prescribed. · I will not increase or change how I take my medications without the approval of the health care provider who signs this Medication Agreement. · I will arrange for refills at the prescribed interval ONLY during regular office hours. I will not ask for refills earlier than agreed, after-hours, on holidays or on weekends. · I will obtain all refills for these medications at Pharmacy:  [address] [place] [address] -  [phone]  · with full consent for my provider and pharmacist to exchange information in writing or verbally. · I will not request any pain medications or controlled substances from other providers and will inform this provider of all other medications I am taking. · I will inform my other health care providers that I am taking these medications and of the [facility]. In the event of an emergency, I will provide the same information to the emergency department providers. · I will protect my prescriptions and medications. I understand that lost or misplaced prescriptions will not be replaced. · I will keep medications only for my own use and will not share them with others. I will keep all medications away from children.   · I agree to participate in any medical, psychological or psychiatric

## 2018-08-15 DIAGNOSIS — F98.8 ATTENTION DEFICIT DISORDER (ADD) WITHOUT HYPERACTIVITY: ICD-10-CM

## 2018-08-15 NOTE — TELEPHONE ENCOUNTER
From: Monica Mail  Sent: 8/15/2018 8:20 AM EDT  Subject: Medication Renewal Request    Monica Mail would like a refill of the following medications:     Lisdexamfetamine Dimesylate (VYVANSE) 40 MG CAPS [Elvin Arguelles MD]   Patient Comment: I forgot to request this refill from Dr. Faheem Up when I was at my appt. with her on 8/13/18. Dr. Nisha Reyna would refill the 11th of each month.     Preferred pharmacy: [facility]PHARM[facility]Y [address] [place] [address], [address] [place] [address]. - P [phone] - F [phone]

## 2018-08-15 NOTE — TELEPHONE ENCOUNTER
Last Fill 7/11/18  Last Office Visit 8/13/18 (established)  No Pending Appointments  Controlled Substances Monitoring:      RX Monitoring 8/13/2018   Attestation The Prescription Monitoring Report for this patient was reviewed today. Documentation Possible medication side effects, risk of tolerance/dependence & alternative treatments discussed. ;No signs of potential drug abuse or diversion identified

## 2018-08-16 LAB
6-ACETYLMORPHINE: NOT DETECTED
7-AMINOCLONAZEPAM: NOT DETECTED
ALPHA-OH-ALPRAZOLAM: NOT DETECTED
ALPRAZOLAM: NOT DETECTED
AMPHETAMINE: PRESENT
BARBITURATES: NOT DETECTED
BENZOYLECGONINE: NOT DETECTED
BUPRENORPHINE: NOT DETECTED
CARISOPRODOL: NOT DETECTED
CLONAZEPAM: NOT DETECTED
CODEINE: NOT DETECTED
CREATININE URINE: 239.6 MG/DL (ref 20–400)
DIAZEPAM: NOT DETECTED
DRUGS EXPECTED: NORMAL
EER PAIN MGT DRUG PANEL, HIGH RES/EMIT U: NORMAL
ETHYL GLUCURONIDE: NOT DETECTED
FENTANYL: NOT DETECTED
HYDROCODONE: NOT DETECTED
HYDROMORPHONE: NOT DETECTED
LORAZEPAM: NOT DETECTED
MARIJUANA METABOLITE: NOT DETECTED
MDA: NOT DETECTED
MDEA: NOT DETECTED
MDMA URINE: NOT DETECTED
MEPERIDINE: NOT DETECTED
METHADONE: NOT DETECTED
METHAMPHETAMINE: NOT DETECTED
METHYLPHENIDATE: NOT DETECTED
MIDAZOLAM: NOT DETECTED
MORPHINE: NOT DETECTED
NORBUPRENORPHINE, FREE: NOT DETECTED
NORDIAZEPAM: NOT DETECTED
NORFENTANYL: NOT DETECTED
NORHYDROCODONE, URINE: NOT DETECTED
NOROXYCODONE: NOT DETECTED
NOROXYMORPHONE, URINE: NOT DETECTED
OXAZEPAM: NOT DETECTED
OXYCODONE: NOT DETECTED
OXYMORPHONE: NOT DETECTED
PAIN MANAGEMENT DRUG PANEL: NORMAL
PAIN MANAGEMENT DRUG PANEL: NORMAL
PCP: NOT DETECTED
PHENTERMINE: NOT DETECTED
PROPOXYPHENE: NOT DETECTED
TAPENTADOL, URINE: NOT DETECTED
TAPENTADOL-O-SULFATE, URINE: NOT DETECTED
TEMAZEPAM: NOT DETECTED
TRAMADOL: NOT DETECTED
ZOLPIDEM: NOT DETECTED

## 2018-09-17 ENCOUNTER — OFFICE VISIT (OUTPATIENT)
Dept: FAMILY MEDICINE CLINIC | Age: 53
End: 2018-09-17

## 2018-09-17 VITALS
WEIGHT: 109.6 LBS | SYSTOLIC BLOOD PRESSURE: 132 MMHG | DIASTOLIC BLOOD PRESSURE: 80 MMHG | BODY MASS INDEX: 19.42 KG/M2 | HEIGHT: 63 IN | HEART RATE: 65 BPM | OXYGEN SATURATION: 98 %

## 2018-09-17 DIAGNOSIS — F98.8 ATTENTION DEFICIT DISORDER (ADD) WITHOUT HYPERACTIVITY: ICD-10-CM

## 2018-09-17 DIAGNOSIS — J01.91 ACUTE RECURRENT SINUSITIS, UNSPECIFIED LOCATION: ICD-10-CM

## 2018-09-17 DIAGNOSIS — F41.1 ANXIETY STATE: ICD-10-CM

## 2018-09-17 DIAGNOSIS — Z01.818 PREOP EXAMINATION: Primary | ICD-10-CM

## 2018-09-17 DIAGNOSIS — F33.42 RECURRENT MAJOR DEPRESSIVE DISORDER, IN FULL REMISSION (HCC): ICD-10-CM

## 2018-09-17 PROCEDURE — G8427 DOCREV CUR MEDS BY ELIG CLIN: HCPCS | Performed by: FAMILY MEDICINE

## 2018-09-17 PROCEDURE — 3017F COLORECTAL CA SCREEN DOC REV: CPT | Performed by: FAMILY MEDICINE

## 2018-09-17 PROCEDURE — G8420 CALC BMI NORM PARAMETERS: HCPCS | Performed by: FAMILY MEDICINE

## 2018-09-17 PROCEDURE — 99244 OFF/OP CNSLTJ NEW/EST MOD 40: CPT | Performed by: FAMILY MEDICINE

## 2018-09-17 RX ORDER — AZITHROMYCIN 250 MG/1
TABLET, FILM COATED ORAL
Qty: 1 PACKET | Refills: 0 | Status: SHIPPED | OUTPATIENT
Start: 2018-09-17 | End: 2018-09-21

## 2018-09-17 NOTE — PROGRESS NOTES
Preoperative Consultation      Adam Boateng  YOB: 1965    Date of Service:  9/17/2018    Vitals:    09/17/18 1021   BP: 132/80   Site: Right Upper Arm   Position: Sitting   Cuff Size: Medium Adult   Pulse: 65   SpO2: 98%   Weight: 109 lb 9.6 oz (49.7 kg)   Height: 5' 3\" (1.6 m)      Wt Readings from Last 2 Encounters:   09/17/18 109 lb 9.6 oz (49.7 kg)   08/13/18 108 lb 9.6 oz (49.3 kg)     BP Readings from Last 3 Encounters:   09/17/18 132/80   08/13/18 130/72   06/05/18 124/82        Chief Complaint   Patient presents with    Pre-op Exam     9/28/2018: LEFT KNEE ARTHROSCOPY, PARTIAL MEDIAL MENISCECTOMY AND ARTHROSCOPICALLY AIDED TREATMENT OF LEFT MEDIAL TIBIAL PLATEAU FRACTURE     Allergies   Allergen Reactions    Codeine     Phenergan [Promethazine Hcl]     Sulfa Antibiotics     Promethazine Other (See Comments)     CONVULSIONS    Sulfamethoxazole-Trimethoprim Hives     Outpatient Prescriptions Marked as Taking for the 9/17/18 encounter (Office Visit) with Cayden Marquez MD   Medication Sig Dispense Refill    azithromycin (ZITHROMAX Z-GREGORY) 250 MG tablet Take 2 tablets (500 mg) on Day 1, and then take 1 tablet (250 mg) on days 2 through 5. 1 packet 0    Lisdexamfetamine Dimesylate (VYVANSE) 40 MG CAPS Take 40 mg by mouth daily for 30 days. 1 po daily. 30 capsule 0    buPROPion (WELLBUTRIN XL) 300 MG extended release tablet Take 1 tablet by mouth every morning 90 tablet 1    multivitamin (THERAGRAN) per tablet Take 1 tablet by mouth daily. This patient presents to the office today for a preoperative consultation at the request of surgeon who plans on performing-  LEFT KNEE ARTHROSCOPY, PARTIAL MEDIAL MENISCECTOMY AND ARTHROSCOPICALLY AIDED TREATMENT OF LEFT MEDIAL TIBIAL PLATEAU FRACTURE 4/17. Patient denies any chest pain, palpitations, shortness of breath, or diaphoresis. Patient is able to go up a flight of stairs without any cardiac or respiratory symptoms. Occupational History    Not on file. Social History Main Topics    Smoking status: Never Smoker    Smokeless tobacco: Never Used    Alcohol use Yes    Drug use: No    Sexual activity: Yes     Other Topics Concern    Not on file     Social History Narrative    No narrative on file       Review of Systems  A comprehensive review of systems was negative except for what was noted in the HPI. Physical Exam   Constitutional: She is oriented to person, place, and time. She appears well-developed and well-nourished. No distress. HENT:   Head: Normocephalic and atraumatic. Mouth/Throat: Uvula is midline, oropharynx is clear and moist and mucous membranes are normal.   Eyes: Conjunctivae and EOM are normal. Pupils are equal, round, and reactive to light. Neck: Trachea normal and normal range of motion. Neck supple. No JVD present. Carotid bruit is not present. No mass and no thyromegaly present. Cardiovascular: Normal rate, regular rhythm, normal heart sounds and intact distal pulses. Exam reveals no gallop and no friction rub. No murmur heard. Pulmonary/Chest: Effort normal and breath sounds normal. No respiratory distress. She has no wheezes. She has no rales. Abdominal: Soft. Normal aorta and bowel sounds are normal. She exhibits no distension and no mass. There is no hepatosplenomegaly. No tenderness. Musculoskeletal: She exhibits no edema and no tenderness. Neurological: She is alert and oriented to person, place, and time. She has normal strength. No cranial nerve deficit or sensory deficit. Coordination and gait normal.   Skin: Skin is warm and dry. No rash noted. No erythema. Psychiatric: She has a normal mood and affect. Her behavior is normal.            Assessment:       48 y.o. patient with planned surgery as above.     Known risk factors for perioperative complications: None  Current medications which may produce withdrawal symptoms if withheld perioperatively: none Plan:     1. Preoperative workup as follows: none  2. Change in medication regimen before surgery: Discontinue NSAIDs (naproxen) 7 days before surgery, discontinue supplements now until after surgery. 3. Prophylaxis for cardiac events with perioperative beta-blockers: Not indicated  ACC/AHA indications for pre-operative beta-blocker use:    · Vascular surgery with history of postitive stress test  · Intermediate or high risk surgery with history of CAD   · Intermediate or high risk surgery with multiple clinical predictors of CAD- 2 of the following: history of compensated or prior heart failure, history of cerebrovascular disease, DM, or renal insufficiency    Routine administration of higher-dose, long-acting metoprolol in beta-blockernaïve patients on the day of surgery, and in the absence of dose titration is associated with an overall increase in mortality. Beta-blockers should be started days to weeks prior to surgery and titrated to pulse < 70.  4. Deep vein thrombosis prophylaxis: regimen to be chosen by surgical team  5. No contraindications to planned surgery    1. Preop examination  See above. 2. Attention deficit disorder (ADD) without hyperactivity  Stable. Continue current regimen. She is trying several supplements from her chiropractor to see if she can get off the vyvanse. Advised to use caution as risks of supplements not well established. Recommended holding dea given upcoming surgery. The patient seems to be taking medication(s) appropriately and as prescribed. she seems to be benefiting from the medication(s). We have discussed the risks of the medication(s) and patient demonstrates understanding. she is aware of the risks of dependence and abuse. she agrees to only take as prescribed. Controlled Substances Monitoring:     RX Monitoring 9/17/2018   Attestation The Prescription Monitoring Report for this patient was reviewed today.    Documentation Possible medication side effects, risk of tolerance/dependence & alternative treatments discussed. ;No signs of potential drug abuse or diversion identified. 3. Anxiety state  Stable. Continue current regimen. 4. Recurrent major depressive disorder, [facility]  Stable. Continue current regimen. 5. Acute recurrent sinusitis, unspecified location  Discussed options. Z gregory. Return precautions reviewed. - azithromycin (ZITHROMAX Z-GREGORY) 250 MG tablet; Take 2 tablets (500 mg) on Day 1, and then take 1 tablet (250 mg) on days 2 through 5. Dispense: 1 packet; Refill: 0      RTC 3 mo for med refill.

## 2018-09-21 DIAGNOSIS — F98.8 ATTENTION DEFICIT DISORDER (ADD) WITHOUT HYPERACTIVITY: ICD-10-CM

## 2018-09-21 NOTE — TELEPHONE ENCOUNTER
Medication:   Requested Prescriptions     Pending Prescriptions Disp Refills    Lisdexamfetamine Dimesylate (VYVANSE) 40 MG CAPS 30 capsule 0     Sig: Take 40 mg by mouth daily for 30 days. 1 po daily. Last Filled: 8/16/2018  Last appt: 9/17/2018   Next appt: Visit date not found    Last OARRS:   RX Monitoring 9/17/2018   Attestation The Prescription Monitoring Report for this patient was reviewed today. Documentation Possible medication side effects, risk of tolerance/dependence & alternative treatments discussed. ;No signs of potential drug abuse or diversion identified.

## 2018-09-27 ENCOUNTER — ANESTHESIA EVENT (OUTPATIENT)
Dept: OPERATING ROOM | Age: 53
End: 2018-09-27
Payer: COMMERCIAL

## 2018-09-28 ENCOUNTER — APPOINTMENT (OUTPATIENT)
Dept: GENERAL RADIOLOGY | Age: 53
End: 2018-09-28
Attending: ORTHOPAEDIC SURGERY
Payer: COMMERCIAL

## 2018-09-28 ENCOUNTER — HOSPITAL ENCOUNTER (OUTPATIENT)
Age: 53
Setting detail: OUTPATIENT SURGERY
Discharge: HOME | End: 2018-09-28
Attending: ORTHOPAEDIC SURGERY | Admitting: ORTHOPAEDIC SURGERY
Payer: COMMERCIAL

## 2018-09-28 ENCOUNTER — ANESTHESIA (OUTPATIENT)
Dept: OPERATING ROOM | Age: 53
End: 2018-09-28
Payer: COMMERCIAL

## 2018-09-28 VITALS — TEMPERATURE: 96.3 F | SYSTOLIC BLOOD PRESSURE: 90 MMHG | OXYGEN SATURATION: 99 % | DIASTOLIC BLOOD PRESSURE: 50 MMHG

## 2018-09-28 VITALS
RESPIRATION RATE: 18 BRPM | WEIGHT: 108 LBS | BODY MASS INDEX: 19.14 KG/M2 | SYSTOLIC BLOOD PRESSURE: 145 MMHG | HEIGHT: 63 IN | HEART RATE: 45 BPM | TEMPERATURE: 96.6 F | OXYGEN SATURATION: 98 % | DIASTOLIC BLOOD PRESSURE: 84 MMHG

## 2018-09-28 DIAGNOSIS — S83.242D ACUTE MEDIAL MENISCAL TEAR, LEFT, SUBSEQUENT ENCOUNTER: ICD-10-CM

## 2018-09-28 DIAGNOSIS — S82.132A: Primary | ICD-10-CM

## 2018-09-28 LAB — PREGNANCY, URINE: NEGATIVE

## 2018-09-28 PROCEDURE — 3600000004 HC SURGERY LEVEL 4 BASE: Performed by: ORTHOPAEDIC SURGERY

## 2018-09-28 PROCEDURE — 2580000003 HC RX 258: Performed by: ANESTHESIOLOGY

## 2018-09-28 PROCEDURE — 2720000010 HC SURG SUPPLY STERILE: Performed by: ORTHOPAEDIC SURGERY

## 2018-09-28 PROCEDURE — 3700000001 HC ADD 15 MINUTES (ANESTHESIA): Performed by: ORTHOPAEDIC SURGERY

## 2018-09-28 PROCEDURE — 73560 X-RAY EXAM OF KNEE 1 OR 2: CPT

## 2018-09-28 PROCEDURE — 3600000014 HC SURGERY LEVEL 4 ADDTL 15MIN: Performed by: ORTHOPAEDIC SURGERY

## 2018-09-28 PROCEDURE — 2580000003 HC RX 258: Performed by: ORTHOPAEDIC SURGERY

## 2018-09-28 PROCEDURE — 2709999900 HC NON-CHARGEABLE SUPPLY: Performed by: ORTHOPAEDIC SURGERY

## 2018-09-28 PROCEDURE — C1713 ANCHOR/SCREW BN/BN,TIS/BN: HCPCS | Performed by: ORTHOPAEDIC SURGERY

## 2018-09-28 PROCEDURE — 3700000000 HC ANESTHESIA ATTENDED CARE: Performed by: ORTHOPAEDIC SURGERY

## 2018-09-28 PROCEDURE — 2500000003 HC RX 250 WO HCPCS: Performed by: ANESTHESIOLOGY

## 2018-09-28 PROCEDURE — 6360000002 HC RX W HCPCS: Performed by: ANESTHESIOLOGY

## 2018-09-28 PROCEDURE — 2500000003 HC RX 250 WO HCPCS: Performed by: NURSE ANESTHETIST, CERTIFIED REGISTERED

## 2018-09-28 PROCEDURE — 76942 ECHO GUIDE FOR BIOPSY: CPT | Performed by: ANESTHESIOLOGY

## 2018-09-28 PROCEDURE — 7100000000 HC PACU RECOVERY - FIRST 15 MIN: Performed by: ORTHOPAEDIC SURGERY

## 2018-09-28 PROCEDURE — S0028 INJECTION, FAMOTIDINE, 20 MG: HCPCS | Performed by: ANESTHESIOLOGY

## 2018-09-28 PROCEDURE — 6360000002 HC RX W HCPCS: Performed by: ORTHOPAEDIC SURGERY

## 2018-09-28 PROCEDURE — 84703 CHORIONIC GONADOTROPIN ASSAY: CPT

## 2018-09-28 PROCEDURE — 7100000010 HC PHASE II RECOVERY - FIRST 15 MIN: Performed by: ORTHOPAEDIC SURGERY

## 2018-09-28 PROCEDURE — 6360000002 HC RX W HCPCS: Performed by: NURSE ANESTHETIST, CERTIFIED REGISTERED

## 2018-09-28 PROCEDURE — 3209999900 FLUORO FOR SURGICAL PROCEDURES

## 2018-09-28 PROCEDURE — 7100000001 HC PACU RECOVERY - ADDTL 15 MIN: Performed by: ORTHOPAEDIC SURGERY

## 2018-09-28 PROCEDURE — 7100000011 HC PHASE II RECOVERY - ADDTL 15 MIN: Performed by: ORTHOPAEDIC SURGERY

## 2018-09-28 PROCEDURE — 6360000002 HC RX W HCPCS

## 2018-09-28 DEVICE — SUBSTITUTE BONE KNEE CREATION SCP 5CC: Type: IMPLANTABLE DEVICE | Site: KNEE | Status: FUNCTIONAL

## 2018-09-28 RX ORDER — FENTANYL CITRATE 50 UG/ML
INJECTION, SOLUTION INTRAMUSCULAR; INTRAVENOUS PRN
Status: DISCONTINUED | OUTPATIENT
Start: 2018-09-28 | End: 2018-09-28 | Stop reason: SDUPTHER

## 2018-09-28 RX ORDER — ROPIVACAINE HYDROCHLORIDE 5 MG/ML
30 INJECTION, SOLUTION EPIDURAL; INFILTRATION; PERINEURAL ONCE
Status: DISCONTINUED | OUTPATIENT
Start: 2018-09-28 | End: 2018-09-28 | Stop reason: HOSPADM

## 2018-09-28 RX ORDER — PROPOFOL 10 MG/ML
INJECTION, EMULSION INTRAVENOUS PRN
Status: DISCONTINUED | OUTPATIENT
Start: 2018-09-28 | End: 2018-09-28 | Stop reason: SDUPTHER

## 2018-09-28 RX ORDER — SODIUM CHLORIDE, SODIUM LACTATE, POTASSIUM CHLORIDE, CALCIUM CHLORIDE 600; 310; 30; 20 MG/100ML; MG/100ML; MG/100ML; MG/100ML
INJECTION, SOLUTION INTRAVENOUS CONTINUOUS
Status: DISCONTINUED | OUTPATIENT
Start: 2018-09-28 | End: 2018-09-28 | Stop reason: HOSPADM

## 2018-09-28 RX ORDER — DIPHENHYDRAMINE HYDROCHLORIDE 50 MG/ML
12.5 INJECTION INTRAMUSCULAR; INTRAVENOUS
Status: DISCONTINUED | OUTPATIENT
Start: 2018-09-28 | End: 2018-09-28 | Stop reason: HOSPADM

## 2018-09-28 RX ORDER — SODIUM CHLORIDE, SODIUM LACTATE, POTASSIUM CHLORIDE, AND CALCIUM CHLORIDE .6; .31; .03; .02 G/100ML; G/100ML; G/100ML; G/100ML
IRRIGANT IRRIGATION PRN
Status: DISCONTINUED | OUTPATIENT
Start: 2018-09-28 | End: 2018-09-28 | Stop reason: HOSPADM

## 2018-09-28 RX ORDER — SODIUM CHLORIDE 0.9 % (FLUSH) 0.9 %
10 SYRINGE (ML) INJECTION PRN
Status: DISCONTINUED | OUTPATIENT
Start: 2018-09-28 | End: 2018-09-28 | Stop reason: HOSPADM

## 2018-09-28 RX ORDER — ASPIRIN 81 MG/1
81 TABLET ORAL DAILY
Qty: 14 TABLET | Refills: 0 | Status: SHIPPED | OUTPATIENT
Start: 2018-09-28 | End: 2019-01-28

## 2018-09-28 RX ORDER — MEPERIDINE HYDROCHLORIDE 25 MG/ML
12.5 INJECTION INTRAMUSCULAR; INTRAVENOUS; SUBCUTANEOUS EVERY 5 MIN PRN
Status: DISCONTINUED | OUTPATIENT
Start: 2018-09-28 | End: 2018-09-28 | Stop reason: HOSPADM

## 2018-09-28 RX ORDER — DEXAMETHASONE SODIUM PHOSPHATE 4 MG/ML
INJECTION, SOLUTION INTRA-ARTICULAR; INTRALESIONAL; INTRAMUSCULAR; INTRAVENOUS; SOFT TISSUE PRN
Status: DISCONTINUED | OUTPATIENT
Start: 2018-09-28 | End: 2018-09-28 | Stop reason: SDUPTHER

## 2018-09-28 RX ORDER — LIDOCAINE HYDROCHLORIDE 10 MG/ML
1 INJECTION, SOLUTION EPIDURAL; INFILTRATION; INTRACAUDAL; PERINEURAL
Status: DISCONTINUED | OUTPATIENT
Start: 2018-09-28 | End: 2018-09-28 | Stop reason: HOSPADM

## 2018-09-28 RX ORDER — LABETALOL HYDROCHLORIDE 5 MG/ML
5 INJECTION, SOLUTION INTRAVENOUS EVERY 10 MIN PRN
Status: DISCONTINUED | OUTPATIENT
Start: 2018-09-28 | End: 2018-09-28 | Stop reason: HOSPADM

## 2018-09-28 RX ORDER — MIDAZOLAM HYDROCHLORIDE 1 MG/ML
4 INJECTION INTRAMUSCULAR; INTRAVENOUS
Status: COMPLETED | OUTPATIENT
Start: 2018-09-28 | End: 2018-09-28

## 2018-09-28 RX ORDER — ONDANSETRON 2 MG/ML
4 INJECTION INTRAMUSCULAR; INTRAVENOUS
Status: DISCONTINUED | OUTPATIENT
Start: 2018-09-28 | End: 2018-09-28 | Stop reason: HOSPADM

## 2018-09-28 RX ORDER — FENTANYL CITRATE 50 UG/ML
25 INJECTION, SOLUTION INTRAMUSCULAR; INTRAVENOUS EVERY 5 MIN PRN
Status: DISCONTINUED | OUTPATIENT
Start: 2018-09-28 | End: 2018-09-28 | Stop reason: HOSPADM

## 2018-09-28 RX ORDER — OXYCODONE HYDROCHLORIDE 5 MG/1
5 TABLET ORAL EVERY 6 HOURS PRN
Qty: 20 TABLET | Refills: 0 | Status: SHIPPED | OUTPATIENT
Start: 2018-09-28 | End: 2018-10-03

## 2018-09-28 RX ORDER — FENTANYL CITRATE 50 UG/ML
100 INJECTION, SOLUTION INTRAMUSCULAR; INTRAVENOUS ONCE
Status: COMPLETED | OUTPATIENT
Start: 2018-09-28 | End: 2018-09-28

## 2018-09-28 RX ORDER — HYDRALAZINE HYDROCHLORIDE 20 MG/ML
5 INJECTION INTRAMUSCULAR; INTRAVENOUS EVERY 10 MIN PRN
Status: DISCONTINUED | OUTPATIENT
Start: 2018-09-28 | End: 2018-09-28 | Stop reason: HOSPADM

## 2018-09-28 RX ORDER — GLYCOPYRROLATE 0.2 MG/ML
0.1 INJECTION INTRAMUSCULAR; INTRAVENOUS ONCE
Status: COMPLETED | OUTPATIENT
Start: 2018-09-28 | End: 2018-09-28

## 2018-09-28 RX ORDER — LIDOCAINE HYDROCHLORIDE 20 MG/ML
INJECTION, SOLUTION EPIDURAL; INFILTRATION; INTRACAUDAL; PERINEURAL PRN
Status: DISCONTINUED | OUTPATIENT
Start: 2018-09-28 | End: 2018-09-28 | Stop reason: SDUPTHER

## 2018-09-28 RX ORDER — ONDANSETRON 2 MG/ML
INJECTION INTRAMUSCULAR; INTRAVENOUS PRN
Status: DISCONTINUED | OUTPATIENT
Start: 2018-09-28 | End: 2018-09-28 | Stop reason: SDUPTHER

## 2018-09-28 RX ORDER — LIDOCAINE HYDROCHLORIDE 10 MG/ML
INJECTION, SOLUTION EPIDURAL; INFILTRATION; INTRACAUDAL; PERINEURAL
Status: DISCONTINUED
Start: 2018-09-28 | End: 2018-09-28 | Stop reason: HOSPADM

## 2018-09-28 RX ORDER — SODIUM CHLORIDE 0.9 % (FLUSH) 0.9 %
10 SYRINGE (ML) INJECTION EVERY 12 HOURS SCHEDULED
Status: DISCONTINUED | OUTPATIENT
Start: 2018-09-28 | End: 2018-09-28 | Stop reason: HOSPADM

## 2018-09-28 RX ORDER — OXYCODONE HYDROCHLORIDE AND ACETAMINOPHEN 5; 325 MG/1; MG/1
1 TABLET ORAL ONCE
Status: DISCONTINUED | OUTPATIENT
Start: 2018-09-28 | End: 2018-09-28 | Stop reason: HOSPADM

## 2018-09-28 RX ORDER — GLYCOPYRROLATE 1 MG/5 ML
SYRINGE (ML) INTRAVENOUS PRN
Status: DISCONTINUED | OUTPATIENT
Start: 2018-09-28 | End: 2018-09-28 | Stop reason: SDUPTHER

## 2018-09-28 RX ADMIN — SODIUM CHLORIDE, POTASSIUM CHLORIDE, SODIUM LACTATE AND CALCIUM CHLORIDE: 600; 310; 30; 20 INJECTION, SOLUTION INTRAVENOUS at 07:33

## 2018-09-28 RX ADMIN — Medication 2 G: at 08:27

## 2018-09-28 RX ADMIN — MIDAZOLAM HYDROCHLORIDE 2 MG: 1 INJECTION, SOLUTION INTRAMUSCULAR; INTRAVENOUS at 07:49

## 2018-09-28 RX ADMIN — GLYCOPYRROLATE 0.1 MG: 0.2 INJECTION, SOLUTION INTRAMUSCULAR; INTRAVENOUS at 07:43

## 2018-09-28 RX ADMIN — HYDROMORPHONE HYDROCHLORIDE 0.5 MG: 1 INJECTION, SOLUTION INTRAMUSCULAR; INTRAVENOUS; SUBCUTANEOUS at 09:54

## 2018-09-28 RX ADMIN — HYDROMORPHONE HYDROCHLORIDE 0.5 MG: 1 INJECTION, SOLUTION INTRAMUSCULAR; INTRAVENOUS; SUBCUTANEOUS at 09:36

## 2018-09-28 RX ADMIN — PROPOFOL 150 MG: 10 INJECTION, EMULSION INTRAVENOUS at 08:03

## 2018-09-28 RX ADMIN — SODIUM CHLORIDE, SODIUM LACTATE, POTASSIUM CHLORIDE, AND CALCIUM CHLORIDE: 600; 310; 30; 20 INJECTION, SOLUTION INTRAVENOUS at 07:49

## 2018-09-28 RX ADMIN — FENTANYL CITRATE 100 MCG: 50 INJECTION, SOLUTION INTRAMUSCULAR; INTRAVENOUS at 07:50

## 2018-09-28 RX ADMIN — ONDANSETRON 4 MG: 2 INJECTION INTRAMUSCULAR; INTRAVENOUS at 08:22

## 2018-09-28 RX ADMIN — FENTANYL CITRATE 50 MCG: 50 INJECTION INTRAMUSCULAR; INTRAVENOUS at 08:41

## 2018-09-28 RX ADMIN — Medication 0.2 MG: at 07:54

## 2018-09-28 RX ADMIN — FAMOTIDINE 20 MG: 10 INJECTION, SOLUTION INTRAVENOUS at 07:43

## 2018-09-28 RX ADMIN — DEXAMETHASONE SODIUM PHOSPHATE 8 MG: 4 INJECTION, SOLUTION INTRAMUSCULAR; INTRAVENOUS at 08:22

## 2018-09-28 RX ADMIN — LIDOCAINE HYDROCHLORIDE 40 MG: 20 INJECTION, SOLUTION EPIDURAL; INFILTRATION; INTRACAUDAL; PERINEURAL at 08:03

## 2018-09-28 ASSESSMENT — PULMONARY FUNCTION TESTS
PIF_VALUE: 10
PIF_VALUE: 11
PIF_VALUE: 10
PIF_VALUE: 10
PIF_VALUE: 11
PIF_VALUE: 10
PIF_VALUE: 11
PIF_VALUE: 10
PIF_VALUE: 11
PIF_VALUE: 3
PIF_VALUE: 10
PIF_VALUE: 3
PIF_VALUE: 10
PIF_VALUE: 11
PIF_VALUE: 10
PIF_VALUE: 3
PIF_VALUE: 10
PIF_VALUE: 10
PIF_VALUE: 1
PIF_VALUE: 10
PIF_VALUE: 11
PIF_VALUE: 10
PIF_VALUE: 1
PIF_VALUE: 4
PIF_VALUE: 10
PIF_VALUE: 11
PIF_VALUE: 10
PIF_VALUE: 10
PIF_VALUE: 1
PIF_VALUE: 1
PIF_VALUE: 11
PIF_VALUE: 10
PIF_VALUE: 3
PIF_VALUE: 11
PIF_VALUE: 1
PIF_VALUE: 2
PIF_VALUE: 11
PIF_VALUE: 10
PIF_VALUE: 2
PIF_VALUE: 10
PIF_VALUE: 11
PIF_VALUE: 10
PIF_VALUE: 10
PIF_VALUE: 1
PIF_VALUE: 10
PIF_VALUE: 1
PIF_VALUE: 10
PIF_VALUE: 1
PIF_VALUE: 16
PIF_VALUE: 17
PIF_VALUE: 11
PIF_VALUE: 10
PIF_VALUE: 10
PIF_VALUE: 11
PIF_VALUE: 10
PIF_VALUE: 3
PIF_VALUE: 2
PIF_VALUE: 10
PIF_VALUE: 11
PIF_VALUE: 10
PIF_VALUE: 3
PIF_VALUE: 10
PIF_VALUE: 1

## 2018-09-28 ASSESSMENT — PAIN SCALES - GENERAL
PAINLEVEL_OUTOF10: 2
PAINLEVEL_OUTOF10: 5
PAINLEVEL_OUTOF10: 4
PAINLEVEL_OUTOF10: 2
PAINLEVEL_OUTOF10: 7
PAINLEVEL_OUTOF10: 7

## 2018-09-28 ASSESSMENT — PAIN DESCRIPTION - ORIENTATION
ORIENTATION: LEFT

## 2018-09-28 ASSESSMENT — PAIN DESCRIPTION - PAIN TYPE
TYPE: SURGICAL PAIN

## 2018-09-28 ASSESSMENT — PAIN DESCRIPTION - LOCATION
LOCATION: KNEE

## 2018-09-28 ASSESSMENT — PAIN - FUNCTIONAL ASSESSMENT: PAIN_FUNCTIONAL_ASSESSMENT: 0-10

## 2018-09-28 ASSESSMENT — PAIN DESCRIPTION - PROGRESSION: CLINICAL_PROGRESSION: GRADUALLY IMPROVING

## 2018-09-28 NOTE — ANESTHESIA POSTPROCEDURE EVALUATION
Department of Anesthesiology  Postprocedure Note    Patient: Reyes Goldman  MRN: [id number]  YOB: 1965  Date of evaluation: 9/28/2018  Time:  11:31 AM     Procedure Summary     Date:  09/28/18 Room / Location:  [facility] OR 11 / [facility] OR    Anesthesia Start:  2760 Anesthesia Stop:  5731    Procedure:  LEFT KNEE ARTHROSCOPY, PARTIAL MEDIAL MENISCECTOMY AND ARTHROSCOPICALLY AIDED TREATMENT OF LEFT MEDIAL TIBIAL PLATEAU FRACTURE (Left ) Diagnosis:  (COMPLEX TEAR OF MEDIAL MENISCUS LEFT KNEE)    Surgeon:  Konnie Snellen, MD Responsible Provider:  Leno Menchaca MD    Anesthesia Type:  general, regional ASA Status:  2          Anesthesia Type: No value filed. Nicole Phase I: Nicole Score: 10    Nicole Phase II: Nicole Score: 10    Last vitals: Reviewed and per EMR flowsheets. Anesthesia Post Evaluation    Patient location during evaluation: PACU  Patient participation: complete - patient participated  Level of consciousness: awake and alert  Pain scale: please refer to nursing notes. Airway patency: patent  Nausea & Vomiting: no nausea and no vomiting  Complications: no  Cardiovascular status: hemodynamically stable  Respiratory status: spontaneous ventilation  Hydration status: stable  Comments: The patient has a nerve block. It is working and the patient is not experiencing pain in the region covered by the nerve block. The block was not supplemented. The patient was instructed to not use extremity while nerve block is in place and to only resume use after the block has completely worn off and per the surgeon's instructions.

## 2018-09-28 NOTE — H&P
HGB 12.6 08/10/2018    HCT 36.7 08/10/2018    MCV 99.6 08/10/2018    MCH 34.2 08/10/2018    MCHC 34.3 08/10/2018    RDW 12.2 08/10/2018     08/10/2018     GENERAL: Alert and cooperative, aware of today's planned procedure. HEENT: Supple, trachea midline. LUNGS:  Clear bilaterally. No wheezing or rhonchi. CV: Regular rate and rhythm. S1, S2, no murmurs/rubs/gallops. ABDOMEN: Soft, non-tender. No distention, bowel sounds are present. No appliances or piercings. SKIN: Warm and dry. Denies pressure ulcers or decubiti. EXTREMITIES:  Symmetrical, moves all extremities with equal strength, sensation intact. NEUROLOGIC:  Grossly intact- clear speech, verbal responses are appropriate. Bilateral upper and lower extremity movements and sensation are intact. /79   Pulse (!) 46   Temp 97.6 °F (36.4 °C) (Tympanic)   Resp 18   Ht 5' 3\" (1.6 m)   Wt 108 lb (49 kg)   LMP  (LMP Unknown)   SpO2 100%   Breastfeeding? No Comment: post menopausal  BMI 19.13 kg/m²          Assessment: This is a  48 y.o., female, nonsmoker with left knee meniscus tear. Plan: Left knee arthroscopy, partial medial meniscectomy and arthroscopically aided treatment of left medial tibial plateau fracture with Dr. Marilee Keyes. Rehabilitation as directed by surgeon/therapist.  Continue health care monitoring/maintenance with PCP is advised. Consultations: Medicine, OT/PT//RT and other services are available and will be requested on a PRN basis. Additionally, the  existing/original H&P  has been requested, reviewed or otherwise discussed with this patient/family/guardian by me and there are no new clinical changes.      Krystal Leon, APRN  9/28/2018, 8:39 AM

## 2018-09-28 NOTE — ANESTHESIA PRE PROCEDURE
Department of Anesthesiology  Preprocedure Note       Name:  Gaby Wei   Age:  48 y.o.  :  1965                                          MRN:  [id number]         Date:  2018      Surgeon: Estelita Bellamy):  Narinder Adams MD    Procedure: Procedure(s):  LEFT KNEE ARTHROSCOPY, PARTIAL MEDIAL MENISCECTOMY AND ARTHROSCOPICALLY AIDED TREATMENT OF LEFT MEDIAL TIBIAL PLATEAU FRACTURE    Medications prior to admission:   Prior to Admission medications    Medication Sig Start Date End Date Taking? Authorizing Provider   oxyCODONE (ROXICODONE) 5 MG immediate release tablet Take 1 tablet by mouth every 6 hours as needed for Pain for up to 5 days. Intended supply: 5 days. Take lowest dose possible to manage pain. 9/28/18 10/3/18 Yes Narinder Adams MD   aspirin EC 81 MG EC tablet Take 1 tablet by mouth daily 18  Yes Narinder Adams MD   Lisdexamfetamine Dimesylate (VYVANSE) 40 MG CAPS Take 40 mg by mouth daily for 30 days. 1 po daily. 9/24/18 10/24/18 Yes Jenna Dorsey MD   naproxen (NAPROSYN) 500 MG tablet TAKE 1 TABLET TWICE A DAY AFTER MEALS 18  Yes Historical Provider, MD   buPROPion (WELLBUTRIN XL) 300 MG extended release tablet Take 1 tablet by mouth every morning 5/15/18  Yes Devonte Webster MD   multivitamin [facility]) per tablet Take 1 tablet by mouth daily.    Yes Devonte Webster MD       Current medications:    Current Facility-Administered Medications   Medication Dose Route Frequency Provider Last Rate Last Dose    lactated ringers infusion   Intravenous Continuous Clarisa Hernandez MD        sodium chloride flush 0.9 % injection 10 mL  10 mL Intravenous 2 times per day Clarisa Hernandez MD        sodium chloride flush 0.9 % injection 10 mL  10 mL Intravenous PRN Clarisa Hernandez MD        lidocaine PF 1 % injection 1 mL  1 mL Intradermal Once PRN Clarisa Hernandez MD        lactated ringers infusion   Intravenous Continuous Manisha Waters  mL/hr at 18 0708      08/10/2018    CO2 24 08/10/2018    BUN 17 08/10/2018    CREATININE 1.0 08/10/2018    GFRAA >60 08/10/2018    GFRAA >60 11/23/2011    AGRATIO 2.1 08/10/2018    LABGLOM 58 08/10/2018    GLUCOSE 89 08/10/2018    PROT 7.2 08/10/2018    PROT 7.0 11/23/2011    CALCIUM 10.0 08/10/2018    BILITOT 0.6 08/10/2018    ALKPHOS 71 08/10/2018    AST 30 08/10/2018    ALT 21 08/10/2018       POC Tests: No results for input(s): POCGLU, POCNA, POCK, POCCL, POCBUN, POCHEMO, POCHCT in the last 72 hours. Coags: No results found for: PROTIME, INR, APTT    HCG (If Applicable):   Lab Results   Component Value Date    PREGTESTUR Negative 09/28/2018        ABGs: No results found for: PHART, PO2ART, NKC4ERM, TLT4JQM, BEART, F7FVRBUW     Type & Screen (If Applicable):  No results found for: LABABO, [address]    Anesthesia Evaluation  Patient summary reviewed no history of anesthetic complications:   Airway: Mallampati: I  TM distance: >3 FB   Neck ROM: full  Mouth opening: > = 3 FB Dental:    (+) implants      Pulmonary:Negative Pulmonary ROS and normal exam                               Cardiovascular:  Exercise tolerance: good (>4 METS),                     Neuro/Psych:   Negative Neuro/Psych ROS  (+) depression/anxiety             GI/Hepatic/Renal: Neg GI/Hepatic/Renal ROS            Endo/Other: Negative Endo/Other ROS                    Abdominal:           Vascular:                                        Anesthesia Plan      general and regional     ASA 2     (Adductor canal block)  Induction: intravenous. MIPS: Postoperative opioids intended. Anesthetic plan and risks discussed with patient. Plan discussed with CRNA.     Attending anesthesiologist reviewed and agrees with Debbie Ramos MD   9/28/2018

## 2018-09-28 NOTE — OP NOTE
or stress fracture  in the medial tibial plateau was identified. Preoperative surgical planning  allowed for determination of the operative method for accessing the lesion,  intraoperatively imaged fluoroscopy combined with bone targeting instruments  from [employer] were used to guide surgical instruments into the  proximity of the subchondral medial tibial plateau fractures specifically the  [employer]. AccuPort injection cannula was placed in a  subchondral bone. Standard repair methodology was used to treat the  subchondral bone defect in the medial tibial plateau. Imaging fluoroscopy  was utilized to confirm accurate insertion of the AccuPort injection cannula  into the subchondral bone. After insertion fracture stabilization was  performed by injecting 3 cc of [employer] AccuFill bone substitute  into the medial tibial plateau. Image fluoroscopy was used to monitor the  injection process and ensure injection of the bone substitute into the  subchondral bone so that following polymerization the bone substitute  stabilized the fracture and facilitate a fracture repair. The arthroscope was placed into the knee to verify no penetration of the bone substitute into the intra-articular space. The arthroscope was then removed from the joint. The injection  wounds were closed with standard Monocryl. The arthroscopic portals were  closed with Monocryl and a standard dressing was applied. The tourniquet was  deflated. The leg was wrapped in an Ace bandage and the patient was  extubated and transported to the postoperative anesthesia care unit in stable  condition. All sponge and needle counts were correct x2. The patient  tolerated the procedure well and without complication.

## 2018-09-28 NOTE — ANESTHESIA PROCEDURE NOTES
Adductor Canal Block    Patient location during procedure: pre-op  Staffing  Anesthesiologist: Elle Saenz  Performed: anesthesiologist   Preanesthetic Checklist  Completed: patient identified, site marked, surgical consent, pre-op evaluation, timeout performed, IV checked, risks and benefits discussed, monitors and equipment checked, anesthesia consent given, oxygen available and patient being monitored  Peripheral Block  Patient position: supine  Prep: ChloraPrep  Patient monitoring: cardiac monitor, continuous pulse ox, frequent blood pressure checks and IV access  Block type: Femoral  Laterality: left  Injection technique: single-shot  Procedures: ultrasound guided  Local infiltration: lidocaine  Infiltration strength: 1 %  Dose: 3 mL  Approach to block: Low Femoral.  Provider prep: mask and sterile gloves  Local infiltration: lidocaine  Needle  Needle gauge: 21 G  Needle length: 10 cm  Needle localization: ultrasound guidance and anatomical landmarks  Assessment  Injection assessment: negative aspiration for heme, no paresthesia on injection and local visualized surrounding nerve on ultrasound  Paresthesia pain: none  Slow fractionated injection: yes  Hemodynamics: stable  Additional Notes  Please refer to the nursing notes in the patient's chart for the block start and stop times, vital signs (complete list), and medications given during the block. The nerve block is being done for post-operative pain control, as requested by the surgeon. The procedure was discussed in detail with the the patient. Potential complications and side effects were discussed. These include bleeding, vascular puncture, intravascular injection, a reaction to medications given, seizures. If a brachial plexus block is being done, additional complications include a total spinal, hoarseness of voice, sympathetic block (Chloe's syndrome), and shortness of breath related to phrenic nerve block.   All questions regarding the post-op pain management and at surgeon's request

## 2018-10-29 DIAGNOSIS — F98.8 ATTENTION DEFICIT DISORDER (ADD) WITHOUT HYPERACTIVITY: ICD-10-CM

## 2018-10-29 NOTE — TELEPHONE ENCOUNTER
Patient needs a refill on;     Lisdexamfetamine Dimesylate (VYVANSE) 40 MG CAPS [[id number]] ENDED   Order Details   Dose: 40 mg Route: Oral Frequency: DAILY   Dispense Quantity:  30 capsule Refills:  0 Fills remaining:  --           Sig: Take 40 mg by mouth daily for 30 days. 1 po daily.          Written Date:  09/24/18 Expiration Date:  11/23/18     Start Date:  09/24/18 End Date:  10/24/18 after 30 doses     Ordering Provider:  -- Authorizing Provider:  Robert Rodriguez MD Ordering User:  Robert Rodriguez MD          Diagnosis Association: Attention deficit disorder (ADD) without hyperactivity (F98.8)      Original Order:  Lisdexamfetamine Dimesylate (Sarah Roxy) 40 MG CAPS [[id number]]      Pharmacy:  [address] [place] [address] [phone] - F [phone]        Sent to [address] [place] [address] please.

## 2018-11-28 ENCOUNTER — TELEPHONE (OUTPATIENT)
Dept: FAMILY MEDICINE CLINIC | Age: 53
End: 2018-11-28

## 2018-11-28 DIAGNOSIS — F98.8 ATTENTION DEFICIT DISORDER (ADD) WITHOUT HYPERACTIVITY: ICD-10-CM

## 2019-01-24 ENCOUNTER — TELEPHONE (OUTPATIENT)
Dept: FAMILY MEDICINE CLINIC | Age: 54
End: 2019-01-24

## 2019-01-28 ENCOUNTER — OFFICE VISIT (OUTPATIENT)
Dept: FAMILY MEDICINE CLINIC | Age: 54
End: 2019-01-28
Payer: COMMERCIAL

## 2019-01-28 VITALS
OXYGEN SATURATION: 97 % | HEIGHT: 63 IN | DIASTOLIC BLOOD PRESSURE: 90 MMHG | HEART RATE: 58 BPM | BODY MASS INDEX: 20.13 KG/M2 | SYSTOLIC BLOOD PRESSURE: 158 MMHG | WEIGHT: 113.6 LBS

## 2019-01-28 DIAGNOSIS — F33.42 RECURRENT MAJOR DEPRESSIVE DISORDER, IN FULL REMISSION (HCC): ICD-10-CM

## 2019-01-28 DIAGNOSIS — I10 ESSENTIAL HYPERTENSION: Primary | ICD-10-CM

## 2019-01-28 DIAGNOSIS — F98.8 ATTENTION DEFICIT DISORDER (ADD) WITHOUT HYPERACTIVITY: ICD-10-CM

## 2019-01-28 PROCEDURE — G8420 CALC BMI NORM PARAMETERS: HCPCS | Performed by: FAMILY MEDICINE

## 2019-01-28 PROCEDURE — 3017F COLORECTAL CA SCREEN DOC REV: CPT | Performed by: FAMILY MEDICINE

## 2019-01-28 PROCEDURE — 99214 OFFICE O/P EST MOD 30 MIN: CPT | Performed by: FAMILY MEDICINE

## 2019-01-28 PROCEDURE — G8484 FLU IMMUNIZE NO ADMIN: HCPCS | Performed by: FAMILY MEDICINE

## 2019-01-28 PROCEDURE — G8427 DOCREV CUR MEDS BY ELIG CLIN: HCPCS | Performed by: FAMILY MEDICINE

## 2019-01-28 PROCEDURE — 1036F TOBACCO NON-USER: CPT | Performed by: FAMILY MEDICINE

## 2019-01-28 RX ORDER — HYDROCHLOROTHIAZIDE 12.5 MG/1
12.5 CAPSULE, GELATIN COATED ORAL EVERY MORNING
Qty: 30 CAPSULE | Refills: 3 | Status: SHIPPED | OUTPATIENT
Start: 2019-01-28 | End: 2019-05-29 | Stop reason: SDUPTHER

## 2019-02-01 ENCOUNTER — TELEPHONE (OUTPATIENT)
Dept: FAMILY MEDICINE CLINIC | Age: 54
End: 2019-02-01

## 2019-03-01 DIAGNOSIS — F41.1 ANXIETY STATE: ICD-10-CM

## 2019-03-04 RX ORDER — BUPROPION HYDROCHLORIDE 300 MG/1
300 TABLET ORAL EVERY MORNING
Qty: 90 TABLET | Refills: 0 | Status: SHIPPED | OUTPATIENT
Start: 2019-03-04 | End: 2019-05-31 | Stop reason: ALTCHOICE

## 2019-03-06 DIAGNOSIS — F98.8 ATTENTION DEFICIT DISORDER (ADD) WITHOUT HYPERACTIVITY: ICD-10-CM

## 2019-04-29 ENCOUNTER — TELEPHONE (OUTPATIENT)
Dept: FAMILY MEDICINE CLINIC | Age: 54
End: 2019-04-29

## 2019-05-02 ENCOUNTER — OFFICE VISIT (OUTPATIENT)
Dept: FAMILY MEDICINE CLINIC | Age: 54
End: 2019-05-02
Payer: COMMERCIAL

## 2019-05-02 VITALS
BODY MASS INDEX: 19.88 KG/M2 | DIASTOLIC BLOOD PRESSURE: 84 MMHG | WEIGHT: 112.2 LBS | HEIGHT: 63 IN | HEART RATE: 51 BPM | RESPIRATION RATE: 12 BRPM | OXYGEN SATURATION: 99 % | SYSTOLIC BLOOD PRESSURE: 138 MMHG

## 2019-05-02 DIAGNOSIS — F41.1 ANXIETY STATE: ICD-10-CM

## 2019-05-02 DIAGNOSIS — I10 ESSENTIAL HYPERTENSION: ICD-10-CM

## 2019-05-02 DIAGNOSIS — F98.8 ATTENTION DEFICIT DISORDER (ADD) WITHOUT HYPERACTIVITY: ICD-10-CM

## 2019-05-02 DIAGNOSIS — Z00.00 HEALTHCARE MAINTENANCE: Primary | ICD-10-CM

## 2019-05-02 DIAGNOSIS — F33.42 RECURRENT MAJOR DEPRESSIVE DISORDER, IN FULL REMISSION (HCC): ICD-10-CM

## 2019-05-02 LAB
ANION GAP SERPL CALCULATED.3IONS-SCNC: 12 MMOL/L (ref 3–16)
BUN BLDV-MCNC: 22 MG/DL (ref 7–20)
CALCIUM SERPL-MCNC: 9.9 MG/DL (ref 8.3–10.6)
CHLORIDE BLD-SCNC: 105 MMOL/L (ref 99–110)
CO2: 26 MMOL/L (ref 21–32)
CREAT SERPL-MCNC: 1 MG/DL (ref 0.6–1.1)
GFR AFRICAN AMERICAN: >60
GFR NON-AFRICAN AMERICAN: 58
GLUCOSE BLD-MCNC: 103 MG/DL (ref 70–99)
POTASSIUM SERPL-SCNC: 4.1 MMOL/L (ref 3.5–5.1)
SODIUM BLD-SCNC: 143 MMOL/L (ref 136–145)

## 2019-05-02 PROCEDURE — 99396 PREV VISIT EST AGE 40-64: CPT | Performed by: FAMILY MEDICINE

## 2019-05-02 RX ORDER — ATOMOXETINE 40 MG/1
40 CAPSULE ORAL DAILY
Qty: 30 CAPSULE | Refills: 2 | Status: SHIPPED | OUTPATIENT
Start: 2019-05-02 | End: 2019-05-31 | Stop reason: SDUPTHER

## 2019-05-02 NOTE — PATIENT INSTRUCTIONS
maintaining bone mass. Vitamin D and magnesium are also needed to aid in calcium absorption. Although you must work at it, it is possible to get adequate amounts of calcium from your diet. Dairy products are the best dietary sources of calcium. Other good sources include tofu, salmon (canned with edible bones), and blackstrap molasses. If you cannot or do not regularly get enough calcium from your diet, you may need to take a calcium supplement. However, supplements should be used only to supplement the calcium in your diet, not replace it. There are several different calcium compounds on the market. They differ mainly in price and how easily they are absorbed. Be sure to discuss calcium supplementation with your doctor. General recommendations are:   · Women   ¨ 19-50     1,000 milligrams (mg)   ¨ 51 and older    1,200 mg   · Men   ¨ 19-50    1,000 mg   ¨ 51-70    1,000 mg   ¨ Over 70    1,200 mg   Vitamin D is also important. . Our usual source of vitamin D is sunlight. But, with so many of us spending abundant time indoors (and wisely using sunscreen when outdoors), few people get adequate sun exposureespecially during the wintertime. Stuart and other types of fish (like mackeral and sardines) are good vitamin D sources. Also, adding vitamin D-fortified milk to your diet will help you meet your daily needs. . The recommendation for most people (aged 1-70 years) is to get 600 international units (IU) of vitamin D each day. If you want to check on your vitamin D status, talk to your doctor, who will be able to do a blood test.   Exercise   Exercise is an important contributor to building and maintaining bone mass at all ages. It also increases the strength and coordination of muscles that support the bones. Weight-bearing exercise , such as walking , jogging, stair climbing, jumping rope, and dancing, is the best for your bones.  Weight lifting has also been shown to help strengthen bones and prevent osteoporosis. If you are unaccustomed to exercising, talk to your doctor before you begin. Other Lifestyle Factors   Smoking, and alcohol can contribute to bone loss. To reduce your risk, do not smoke, and limit your use of alcohol. Smoking is a very serious risk factor for osteoporosis and should be avoided by anyone seeking to reduce the risk of bone thinning. Also being underweight can increase your risk for osteoporosis   Medications for Prevention and Treatment   For women who are postmenopausal, there are medicines available to prevent osteoporosis. Some examples include:   · Bisphosphonates, like alendronate (Fosamax)   · Estrogen with progestin (hormones)   · Raloxifene (Evista)   Your Prevention Strategy   There is general agreement that everyone can reduce their risk of developing osteoporosis by making lifestyle changes. A healthy diet and regular exercise are important throughout your life. Avoiding smoking and limiting alcohol use are two of the most important prevention strategies you can adopt. Women from midlife on, older men, and anyone else at increased risk for osteoporosis should evaluate their risk factors in order to develop a prevention strategy. How you implement lifestyle changes and whether you take medicines are decisions that should be made by you and your doctor.      Last Reviewed: February 2011 Gabriella Herrera MD   Updated: 2/17/2011

## 2019-05-16 ENCOUNTER — TELEPHONE (OUTPATIENT)
Dept: FAMILY MEDICINE CLINIC | Age: 54
End: 2019-05-16

## 2019-05-16 ENCOUNTER — OFFICE VISIT (OUTPATIENT)
Dept: FAMILY MEDICINE CLINIC | Age: 54
End: 2019-05-16
Payer: COMMERCIAL

## 2019-05-16 VITALS
DIASTOLIC BLOOD PRESSURE: 88 MMHG | HEIGHT: 63 IN | BODY MASS INDEX: 19.6 KG/M2 | WEIGHT: 110.6 LBS | SYSTOLIC BLOOD PRESSURE: 138 MMHG

## 2019-05-16 DIAGNOSIS — B96.89 ACUTE BACTERIAL SINUSITIS: Primary | ICD-10-CM

## 2019-05-16 DIAGNOSIS — Z00.00 HEALTHCARE MAINTENANCE: ICD-10-CM

## 2019-05-16 DIAGNOSIS — B00.1 COLD SORE: ICD-10-CM

## 2019-05-16 DIAGNOSIS — J01.90 ACUTE BACTERIAL SINUSITIS: Primary | ICD-10-CM

## 2019-05-16 PROCEDURE — G8427 DOCREV CUR MEDS BY ELIG CLIN: HCPCS | Performed by: NURSE PRACTITIONER

## 2019-05-16 PROCEDURE — 99214 OFFICE O/P EST MOD 30 MIN: CPT | Performed by: NURSE PRACTITIONER

## 2019-05-16 PROCEDURE — 1036F TOBACCO NON-USER: CPT | Performed by: NURSE PRACTITIONER

## 2019-05-16 PROCEDURE — 3017F COLORECTAL CA SCREEN DOC REV: CPT | Performed by: NURSE PRACTITIONER

## 2019-05-16 PROCEDURE — 90471 IMMUNIZATION ADMIN: CPT | Performed by: NURSE PRACTITIONER

## 2019-05-16 PROCEDURE — G8420 CALC BMI NORM PARAMETERS: HCPCS | Performed by: NURSE PRACTITIONER

## 2019-05-16 PROCEDURE — 90750 HZV VACC RECOMBINANT IM: CPT | Performed by: NURSE PRACTITIONER

## 2019-05-16 RX ORDER — ACYCLOVIR 50 MG/G
OINTMENT TOPICAL
Qty: 30 G | Refills: 1 | Status: SHIPPED | OUTPATIENT
Start: 2019-05-16 | End: 2019-05-23

## 2019-05-16 RX ORDER — VALACYCLOVIR HYDROCHLORIDE 1 G/1
1000 TABLET, FILM COATED ORAL 2 TIMES DAILY
Qty: 14 TABLET | Refills: 0 | Status: SHIPPED | OUTPATIENT
Start: 2019-05-16 | End: 2019-05-31 | Stop reason: ALTCHOICE

## 2019-05-16 RX ORDER — AMOXICILLIN AND CLAVULANATE POTASSIUM 875; 125 MG/1; MG/1
1 TABLET, FILM COATED ORAL 2 TIMES DAILY
Qty: 14 TABLET | Refills: 0 | Status: SHIPPED | OUTPATIENT
Start: 2019-05-16 | End: 2019-05-23

## 2019-05-16 ASSESSMENT — ENCOUNTER SYMPTOMS
COUGH: 0
RHINORRHEA: 0
ABDOMINAL PAIN: 0
SINUS PRESSURE: 1
ROS SKIN COMMENTS: COLD SORE
SORE THROAT: 1

## 2019-05-16 NOTE — PROGRESS NOTES
[facility] PHYSICIAN PRACTICES  [facility] PRIMARY CARE  [address] [place] [address]: [phone]         2019     Virgen Terrell (:  1965) is a 47 y.o. female, here for evaluation of the following medical concerns:    Chief Complaint   Patient presents with    Mouth Lesions     gets cold sores when she is stressed out. requesting new rx.  Sinusitis     bloody/crusty nose, sinus pressure - started about a week ago.  Health Maintenance     Agreeable to shingles if OK w/ provider. HPI  Cold sores  Has been having stress at work  Rarely gets them  Valtrex and zovirax cream work    Sinusitis  X 1 week  Has had 2 sinus surgeries- Dr. Oz Liu dried up and crusty  Sinus pressure  No fevers  HA- frontal  Has not tried anything- does not use OTC    Review of Systems   Constitutional: Negative for activity change, appetite change and fever. HENT: Positive for congestion, postnasal drip, sinus pressure and sore throat. Negative for rhinorrhea. Respiratory: Negative for cough. Cardiovascular: Negative for chest pain. Gastrointestinal: Negative for abdominal pain. Skin:        Cold sore   Neurological: Positive for headaches. Negative for dizziness. Prior to Visit Medications    Medication Sig Taking? Authorizing Provider   amoxicillin-clavulanate (AUGMENTIN) 875-125 MG per tablet Take 1 tablet by mouth 2 times daily for 7 days Yes LINDEN Pak CNP   acyclovir (ZOVIRAX) 5 % ointment Apply topically 5 times daily.  Yes LINDEN Pak CNP   valACYclovir (VALTREX) 1 g tablet Take 1 tablet by mouth 2 times daily Yes LINDEN Pak CNP   atomoxetine (STRATTERA) 40 MG capsule Take 1 capsule by mouth daily Yes Kit Chaudhary MD   buPROPion (WELLBUTRIN XL) 300 MG extended release tablet Take 1 tablet by mouth every morning Yes Kit Chaudhary MD   hydrochlorothiazide (MICROZIDE) 12.5 MG capsule Take 1 capsule by mouth every morning Yes Nnamdi Long MD   naproxen (NAPROSYN) 500 MG tablet prn Yes Historical Provider, MD   multivitamin [facility]) per tablet Take 1 tablet by mouth daily. Yes Josette Lord MD        Social History     Tobacco Use    Smoking status: Never Smoker    Smokeless tobacco: Never Used   Substance Use Topics    Alcohol use: Yes        Vitals:    05/16/19 1536   BP: 138/88   Site: Right Upper Arm   Position: Sitting   Cuff Size: Medium Adult   Weight: 110 lb 9.6 oz (50.2 kg)   Height: 5' 3\" (1.6 m)     Estimated body mass index is 19.59 kg/m² as calculated from the following:    Height as of this encounter: 5' 3\" (1.6 m). Weight as of this encounter: 110 lb 9.6 oz (50.2 kg). Physical Exam   Constitutional: She appears well-developed and well-nourished. HENT:   Head: Normocephalic. Right Ear: Tympanic membrane, external ear and ear canal normal.   Left Ear: Tympanic membrane, external ear and ear canal normal.   Nose: Right sinus exhibits maxillary sinus tenderness and frontal sinus tenderness. Left sinus exhibits maxillary sinus tenderness and frontal sinus tenderness. Mouth/Throat: Uvula is midline, oropharynx is clear and moist and mucous membranes are normal.   Cardiovascular: Normal rate, regular rhythm, normal heart sounds and normal pulses. Pulmonary/Chest: Effort normal and breath sounds normal.   Psychiatric: She has a normal mood and affect. ASSESSMENT/PLAN:  1. Acute bacterial sinusitis  Stable; Begin Amox/clavulanate. Discussed using a humidifier and encouraging fluids. Patient declines OTC decongestants or antihistamines. - amoxicillin-clavulanate (AUGMENTIN) 875-125 MG per tablet; Take 1 tablet by mouth 2 times daily for 7 days  Dispense: 14 tablet; Refill: 0    2. Cold sore  Stable; Refilled valtrex and zovirax. - acyclovir (ZOVIRAX) 5 % ointment; Apply topically 5 times daily. Dispense: 30 g; Refill: 1  - valACYclovir (VALTREX) 1 g tablet;  Take 1 tablet

## 2019-05-16 NOTE — TELEPHONE ENCOUNTER
Patient is requesting an appointment after 4pm. I advised her that her last appointment is at 4. She said to put her down at 3:30 and she would figure something out. Patient scheduled.

## 2019-05-16 NOTE — TELEPHONE ENCOUNTER
Patient was transferred from pre-services requesting an appointment today. Her daughter is being seen today at 4 pm with Ambika Harden. She has a suspected sinus infection and cold sores. She has bloody, green discharge coming from her nose and she does not have good drainage due to 2 sinus surgeries. Her sinus' hurt. She has a history of shingles and when she is stressed she gets cold sores/blisters on her lips which is happening now. She would either like to come in with her daughter this afternoon and/or she would like prescriptions sent to [facility] on [address] for Zovirax 5% ointment and Valtrax pills. Please call to advise her what can be done.

## 2019-05-29 DIAGNOSIS — I10 ESSENTIAL HYPERTENSION: ICD-10-CM

## 2019-05-29 NOTE — TELEPHONE ENCOUNTER
Medication:   Requested Prescriptions     Pending Prescriptions Disp Refills    hydrochlorothiazide (MICROZIDE) 12.5 MG capsule [Pharmacy Med Name: HYDROCHLOROTHIAZIDE 12.5 MG CP] 30 capsule 3     Sig: TAKE 1 CAPSULE BY MOUTH EVERY DAY IN THE MORNING        Last Filled:  01.28.2019 #30 w/ 3 refills     Patient Phone Number: [phone] (home)      Last appt: 5/02/2019 Return in about 3 months (around 8/2/2019) for Chronic conditions, Medication Refill. Next appt: Visit date not found    Last OARRS:   RX Monitoring 5/2/2019   Attestation The Prescription Monitoring Report for this patient was reviewed today. Chronic Pain Routine Monitoring Possible medication side effects, risk of tolerance/dependence & alternative treatments discussed. ;No signs of potential drug abuse or diversion identified: otherwise, see note documentation

## 2019-05-30 RX ORDER — HYDROCHLOROTHIAZIDE 12.5 MG/1
CAPSULE, GELATIN COATED ORAL
Qty: 30 CAPSULE | Refills: 1 | Status: SHIPPED | OUTPATIENT
Start: 2019-05-30 | End: 2019-07-24 | Stop reason: SDUPTHER

## 2019-05-31 ENCOUNTER — OFFICE VISIT (OUTPATIENT)
Dept: FAMILY MEDICINE CLINIC | Age: 54
End: 2019-05-31
Payer: COMMERCIAL

## 2019-05-31 VITALS
WEIGHT: 114 LBS | HEART RATE: 52 BPM | BODY MASS INDEX: 20.2 KG/M2 | DIASTOLIC BLOOD PRESSURE: 84 MMHG | OXYGEN SATURATION: 100 % | SYSTOLIC BLOOD PRESSURE: 141 MMHG | HEIGHT: 63 IN

## 2019-05-31 DIAGNOSIS — R31.9 HEMATURIA, UNSPECIFIED TYPE: Primary | ICD-10-CM

## 2019-05-31 DIAGNOSIS — R39.9 UTI SYMPTOMS: ICD-10-CM

## 2019-05-31 DIAGNOSIS — F98.8 ATTENTION DEFICIT DISORDER (ADD) WITHOUT HYPERACTIVITY: ICD-10-CM

## 2019-05-31 LAB
BILIRUBIN, POC: NEGATIVE
BLOOD URINE, POC: ABNORMAL
CLARITY, POC: CLEAR
COLOR, POC: ABNORMAL
GLUCOSE URINE, POC: NEGATIVE
KETONES, POC: NEGATIVE
LEUKOCYTE EST, POC: NEGATIVE
NITRITE, POC: ABNORMAL
PH, POC: 6
PROTEIN, POC: ABNORMAL
SPECIFIC GRAVITY, POC: >1.03
UROBILINOGEN, POC: 0.2

## 2019-05-31 PROCEDURE — 1036F TOBACCO NON-USER: CPT | Performed by: NURSE PRACTITIONER

## 2019-05-31 PROCEDURE — 99214 OFFICE O/P EST MOD 30 MIN: CPT | Performed by: NURSE PRACTITIONER

## 2019-05-31 PROCEDURE — 3017F COLORECTAL CA SCREEN DOC REV: CPT | Performed by: NURSE PRACTITIONER

## 2019-05-31 PROCEDURE — G8427 DOCREV CUR MEDS BY ELIG CLIN: HCPCS | Performed by: NURSE PRACTITIONER

## 2019-05-31 PROCEDURE — 81002 URINALYSIS NONAUTO W/O SCOPE: CPT | Performed by: NURSE PRACTITIONER

## 2019-05-31 PROCEDURE — G8420 CALC BMI NORM PARAMETERS: HCPCS | Performed by: NURSE PRACTITIONER

## 2019-05-31 RX ORDER — NITROFURANTOIN 25; 75 MG/1; MG/1
100 CAPSULE ORAL 2 TIMES DAILY
Qty: 10 CAPSULE | Refills: 0 | Status: SHIPPED | OUTPATIENT
Start: 2019-05-31 | End: 2019-06-05

## 2019-05-31 RX ORDER — ATOMOXETINE 40 MG/1
40 CAPSULE ORAL DAILY
Qty: 30 CAPSULE | Refills: 2 | Status: SHIPPED | OUTPATIENT
Start: 2019-05-31 | End: 2019-10-28 | Stop reason: SDUPTHER

## 2019-05-31 RX ORDER — BUPROPION HYDROCHLORIDE 300 MG/1
300 TABLET ORAL EVERY MORNING
COMMUNITY
End: 2019-06-04

## 2019-05-31 ASSESSMENT — ENCOUNTER SYMPTOMS
ABDOMINAL PAIN: 0
CONSTIPATION: 0

## 2019-05-31 NOTE — PROGRESS NOTES
[facility] PHYSICIAN PRACTICES  [facility] PRIMARY CARE  [address] [place] [id number]  Loc: [phone]         2019     Gloria Aldana (:  1965) is a 47 y.o. female, here for evaluation of the following medical concerns:    Chief Complaint   Patient presents with    Cystitis        HPI  UTI symptoms  Started this morning  Dysuria, frequency  Does not get menses  + Drinking fluids  Does not get many UTIs  Has not tried any medications    Review of Systems   Constitutional: Negative for appetite change, chills and fever. Gastrointestinal: Negative for abdominal pain and constipation. Genitourinary: Positive for dysuria, hematuria and urgency. Negative for vaginal bleeding. Neurological: Negative for dizziness and headaches. Psychiatric/Behavioral: Positive for decreased concentration. Prior to Visit Medications    Medication Sig Taking? Authorizing Provider   buPROPion (WELLBUTRIN XL) 300 MG extended release tablet Take 300 mg by mouth every morning Yes Historical Provider, MD   nitrofurantoin, macrocrystal-monohydrate, (MACROBID) 100 MG capsule Take 1 capsule by mouth 2 times daily for 5 days Yes Yuriy Duff, APRN - CNP   atomoxetine (STRATTERA) 40 MG capsule Take 1 capsule by mouth daily Yes Yuriy Home, APRN - CNP   hydrochlorothiazide (MICROZIDE) 12.5 MG capsule TAKE 1 CAPSULE BY MOUTH EVERY DAY IN THE MORNING Yes Silverio Leavitt MD   naproxen (NAPROSYN) 500 MG tablet prn Yes Historical Provider, MD   multivitamin [facility]) per tablet Take 1 tablet by mouth daily.  Yes Pricila Banks MD        Social History     Tobacco Use    Smoking status: Never Smoker    Smokeless tobacco: Never Used   Substance Use Topics    Alcohol use: Yes        Vitals:    19 1615   BP: (!) 141/84   Site: Right Upper Arm   Position: Sitting   Cuff Size: Medium Adult   Pulse: 52   SpO2: 100%   Weight: 114 lb (51.7 kg)   Height: 5' 3\" (1.6 m)     Estimated body mass index is 20.19 kg/m² as calculated from the following:    Height as of this encounter: 5' 3\" (1.6 m). Weight as of this encounter: 114 lb (51.7 kg). Physical Exam   Constitutional: She appears well-developed and well-nourished. HENT:   Head: Normocephalic. Cardiovascular: Normal rate, regular rhythm, normal heart sounds and normal pulses. Pulmonary/Chest: Effort normal and breath sounds normal.   Abdominal: Soft. There is tenderness in the suprapubic area. There is no CVA tenderness. Psychiatric: She has a normal mood and affect. ASSESSMENT/PLAN:  1. Hematuria, unspecified type  UA: positive for hematuria, small amount of leuks. Encouraged patient to continue plenty of fluids. Can use Macrobid if symptoms worsen over the weekend. Might have gotten a diluted urine sample. Will send for culture. - POCT Urinalysis no Micro  - Urine Culture  - nitrofurantoin, macrocrystal-monohydrate, (MACROBID) 100 MG capsule; Take 1 capsule by mouth 2 times daily for 5 days  Dispense: 10 capsule; Refill: 0    2. UTI symptoms  Stable; See #1  - nitrofurantoin, macrocrystal-monohydrate, (MACROBID) 100 MG capsule; Take 1 capsule by mouth 2 times daily for 5 days  Dispense: 10 capsule; Refill: 0    3. Attention deficit disorder (ADD) without hyperactivity  Stable; Concentration is good on current dose. Denies SE. Refilled medication.  - atomoxetine (STRATTERA) 40 MG capsule; Take 1 capsule by mouth daily  Dispense: 30 capsule; Refill: 2      Follow Up:     Return in about 3 months (around 8/31/2019), or if symptoms worsen or fail to improve.

## 2019-06-02 LAB
ORGANISM: ABNORMAL
URINE CULTURE, ROUTINE: ABNORMAL
URINE CULTURE, ROUTINE: ABNORMAL

## 2019-06-04 DIAGNOSIS — F41.1 ANXIETY STATE: ICD-10-CM

## 2019-06-04 RX ORDER — BUPROPION HYDROCHLORIDE 300 MG/1
TABLET ORAL
Qty: 90 TABLET | Refills: 0 | Status: SHIPPED | OUTPATIENT
Start: 2019-06-04 | End: 2019-08-30 | Stop reason: SDUPTHER

## 2019-06-04 NOTE — TELEPHONE ENCOUNTER
Medication:   Requested Prescriptions     Pending Prescriptions Disp Refills    buPROPion (WELLBUTRIN XL) 300 MG extended release tablet [Pharmacy Med Name: BUPROPION HCL  MG TABLET] 90 tablet 0     Sig: TAKE 1 TABLET BY MOUTH EVERY DAY IN THE MORNING        Last Filled:  03.04.2019 #90    Patient Phone Number: [phone] (home)     Last appt: 5/31/2019    Return in about 3 months (around 8/2/2019) for Chronic conditions, Medication Refill. Next appt: Visit date not found    Last OARRS:   RX Monitoring 5/2/2019   Attestation The Prescription Monitoring Report for this patient was reviewed today. Chronic Pain Routine Monitoring Possible medication side effects, risk of tolerance/dependence & alternative treatments discussed. ;No signs of potential drug abuse or diversion identified: otherwise, see note documentation

## 2019-07-24 DIAGNOSIS — I10 ESSENTIAL HYPERTENSION: ICD-10-CM

## 2019-07-25 RX ORDER — HYDROCHLOROTHIAZIDE 12.5 MG/1
CAPSULE, GELATIN COATED ORAL
Qty: 30 CAPSULE | Refills: 1 | Status: SHIPPED | OUTPATIENT
Start: 2019-07-25 | End: 2019-09-28 | Stop reason: SDUPTHER

## 2019-09-27 DIAGNOSIS — F41.1 ANXIETY STATE: ICD-10-CM

## 2019-09-28 DIAGNOSIS — I10 ESSENTIAL HYPERTENSION: ICD-10-CM

## 2019-09-30 RX ORDER — HYDROCHLOROTHIAZIDE 12.5 MG/1
CAPSULE, GELATIN COATED ORAL
Qty: 30 CAPSULE | Refills: 0 | Status: SHIPPED | OUTPATIENT
Start: 2019-09-30 | End: 2019-10-25 | Stop reason: SDUPTHER

## 2019-09-30 RX ORDER — BUPROPION HYDROCHLORIDE 300 MG/1
TABLET ORAL
Qty: 30 TABLET | Refills: 0 | Status: SHIPPED | OUTPATIENT
Start: 2019-09-30 | End: 2019-10-29 | Stop reason: SDUPTHER

## 2019-10-02 DIAGNOSIS — F41.1 ANXIETY STATE: ICD-10-CM

## 2019-10-03 RX ORDER — BUPROPION HYDROCHLORIDE 300 MG/1
TABLET ORAL
Qty: 30 TABLET | Refills: 0 | OUTPATIENT
Start: 2019-10-03

## 2019-10-25 DIAGNOSIS — I10 ESSENTIAL HYPERTENSION: ICD-10-CM

## 2019-10-25 RX ORDER — HYDROCHLOROTHIAZIDE 12.5 MG/1
CAPSULE, GELATIN COATED ORAL
Qty: 30 CAPSULE | Refills: 0 | Status: SHIPPED | OUTPATIENT
Start: 2019-10-25 | End: 2019-11-12

## 2019-10-28 DIAGNOSIS — F98.8 ATTENTION DEFICIT DISORDER (ADD) WITHOUT HYPERACTIVITY: ICD-10-CM

## 2019-10-28 RX ORDER — ATOMOXETINE 40 MG/1
CAPSULE ORAL
Qty: 30 CAPSULE | Refills: 2 | Status: SHIPPED | OUTPATIENT
Start: 2019-10-28 | End: 2020-02-10

## 2019-10-29 DIAGNOSIS — F41.1 ANXIETY STATE: ICD-10-CM

## 2019-10-29 RX ORDER — BUPROPION HYDROCHLORIDE 300 MG/1
TABLET ORAL
Qty: 30 TABLET | Refills: 0 | Status: SHIPPED | OUTPATIENT
Start: 2019-10-29 | End: 2019-11-29 | Stop reason: SDUPTHER

## 2019-11-12 ENCOUNTER — OFFICE VISIT (OUTPATIENT)
Dept: FAMILY MEDICINE CLINIC | Age: 54
End: 2019-11-12
Payer: COMMERCIAL

## 2019-11-12 VITALS
DIASTOLIC BLOOD PRESSURE: 92 MMHG | HEIGHT: 63 IN | SYSTOLIC BLOOD PRESSURE: 120 MMHG | BODY MASS INDEX: 19.49 KG/M2 | WEIGHT: 110 LBS

## 2019-11-12 DIAGNOSIS — Z12.31 ENCOUNTER FOR SCREENING MAMMOGRAM FOR BREAST CANCER: ICD-10-CM

## 2019-11-12 DIAGNOSIS — F98.8 ATTENTION DEFICIT DISORDER (ADD) WITHOUT HYPERACTIVITY: ICD-10-CM

## 2019-11-12 DIAGNOSIS — J06.9 UPPER RESPIRATORY TRACT INFECTION, UNSPECIFIED TYPE: ICD-10-CM

## 2019-11-12 DIAGNOSIS — Z00.00 HEALTHCARE MAINTENANCE: ICD-10-CM

## 2019-11-12 DIAGNOSIS — I10 ESSENTIAL HYPERTENSION: Primary | ICD-10-CM

## 2019-11-12 DIAGNOSIS — R31.29 MICROSCOPIC HEMATURIA: ICD-10-CM

## 2019-11-12 LAB
BILIRUBIN URINE: NEGATIVE
BLOOD, URINE: NEGATIVE
CLARITY: CLEAR
COLOR: YELLOW
EPITHELIAL CELLS, UA: 4 /HPF (ref 0–5)
GLUCOSE URINE: NEGATIVE MG/DL
HYALINE CASTS: 4 /LPF (ref 0–8)
KETONES, URINE: NEGATIVE MG/DL
LEUKOCYTE ESTERASE, URINE: ABNORMAL
MICROSCOPIC EXAMINATION: YES
NITRITE, URINE: NEGATIVE
PH UA: 5.5 (ref 5–8)
PROTEIN UA: NEGATIVE MG/DL
RBC UA: 1 /HPF (ref 0–4)
SPECIFIC GRAVITY UA: 1.03 (ref 1–1.03)
URINE TYPE: ABNORMAL
UROBILINOGEN, URINE: 0.2 E.U./DL
WBC UA: 2 /HPF (ref 0–5)

## 2019-11-12 PROCEDURE — G8427 DOCREV CUR MEDS BY ELIG CLIN: HCPCS | Performed by: FAMILY MEDICINE

## 2019-11-12 PROCEDURE — 3017F COLORECTAL CA SCREEN DOC REV: CPT | Performed by: FAMILY MEDICINE

## 2019-11-12 PROCEDURE — G8482 FLU IMMUNIZE ORDER/ADMIN: HCPCS | Performed by: FAMILY MEDICINE

## 2019-11-12 PROCEDURE — 99214 OFFICE O/P EST MOD 30 MIN: CPT | Performed by: FAMILY MEDICINE

## 2019-11-12 PROCEDURE — 1036F TOBACCO NON-USER: CPT | Performed by: FAMILY MEDICINE

## 2019-11-12 PROCEDURE — G8420 CALC BMI NORM PARAMETERS: HCPCS | Performed by: FAMILY MEDICINE

## 2019-11-12 RX ORDER — HYDROCHLOROTHIAZIDE 25 MG/1
25 TABLET ORAL EVERY MORNING
Qty: 90 TABLET | Refills: 1 | Status: SHIPPED | OUTPATIENT
Start: 2019-11-12 | End: 2020-02-10 | Stop reason: SDUPTHER

## 2019-11-13 LAB — URINE CULTURE, ROUTINE: NORMAL

## 2019-11-29 DIAGNOSIS — F41.1 ANXIETY STATE: ICD-10-CM

## 2019-11-29 RX ORDER — BUPROPION HYDROCHLORIDE 300 MG/1
TABLET ORAL
Qty: 30 TABLET | Refills: 0 | Status: SHIPPED | OUTPATIENT
Start: 2019-11-29 | End: 2020-01-13

## 2020-01-13 RX ORDER — BUPROPION HYDROCHLORIDE 300 MG/1
TABLET ORAL
Qty: 30 TABLET | Refills: 0 | Status: SHIPPED | OUTPATIENT
Start: 2020-01-13 | End: 2020-02-10

## 2020-02-10 RX ORDER — ATOMOXETINE 40 MG/1
CAPSULE ORAL
Qty: 30 CAPSULE | Refills: 0 | Status: SHIPPED | OUTPATIENT
Start: 2020-02-10 | End: 2020-03-09

## 2020-02-10 RX ORDER — BUPROPION HYDROCHLORIDE 300 MG/1
TABLET ORAL
Qty: 10 TABLET | Refills: 0 | OUTPATIENT
Start: 2020-02-10

## 2020-02-10 RX ORDER — BUPROPION HYDROCHLORIDE 300 MG/1
TABLET ORAL
Qty: 30 TABLET | Refills: 0 | Status: SHIPPED | OUTPATIENT
Start: 2020-02-10 | End: 2020-03-13 | Stop reason: SDUPTHER

## 2020-02-10 RX ORDER — HYDROCHLOROTHIAZIDE 25 MG/1
25 TABLET ORAL EVERY MORNING
Qty: 30 TABLET | Refills: 0 | Status: SHIPPED | OUTPATIENT
Start: 2020-02-10 | End: 2020-04-13

## 2020-03-09 RX ORDER — ATOMOXETINE 40 MG/1
CAPSULE ORAL
Qty: 30 CAPSULE | Refills: 0 | Status: SHIPPED | OUTPATIENT
Start: 2020-03-09 | End: 2020-04-02

## 2020-03-13 RX ORDER — BUPROPION HYDROCHLORIDE 300 MG/1
TABLET ORAL
Qty: 30 TABLET | Refills: 0 | Status: SHIPPED | OUTPATIENT
Start: 2020-03-13 | End: 2020-04-09

## 2020-03-20 ENCOUNTER — OFFICE VISIT (OUTPATIENT)
Dept: FAMILY MEDICINE CLINIC | Age: 55
End: 2020-03-20
Payer: COMMERCIAL

## 2020-03-20 VITALS
BODY MASS INDEX: 19.84 KG/M2 | OXYGEN SATURATION: 99 % | HEART RATE: 64 BPM | WEIGHT: 112 LBS | HEIGHT: 63 IN | TEMPERATURE: 98 F | DIASTOLIC BLOOD PRESSURE: 82 MMHG | SYSTOLIC BLOOD PRESSURE: 124 MMHG

## 2020-03-20 PROCEDURE — 99214 OFFICE O/P EST MOD 30 MIN: CPT | Performed by: FAMILY MEDICINE

## 2020-03-20 PROCEDURE — G8427 DOCREV CUR MEDS BY ELIG CLIN: HCPCS | Performed by: FAMILY MEDICINE

## 2020-03-20 PROCEDURE — G8482 FLU IMMUNIZE ORDER/ADMIN: HCPCS | Performed by: FAMILY MEDICINE

## 2020-03-20 PROCEDURE — 3017F COLORECTAL CA SCREEN DOC REV: CPT | Performed by: FAMILY MEDICINE

## 2020-03-20 PROCEDURE — G8420 CALC BMI NORM PARAMETERS: HCPCS | Performed by: FAMILY MEDICINE

## 2020-03-20 PROCEDURE — 1036F TOBACCO NON-USER: CPT | Performed by: FAMILY MEDICINE

## 2020-03-20 RX ORDER — ALBUTEROL SULFATE 90 UG/1
2 AEROSOL, METERED RESPIRATORY (INHALATION) 4 TIMES DAILY PRN
Qty: 1 INHALER | Refills: 0 | Status: SHIPPED | OUTPATIENT
Start: 2020-03-20 | End: 2020-04-15

## 2020-03-20 NOTE — PROGRESS NOTES
interventions taken. Minimial sinus symptoms. No reflux. Has taken aleve prn which helps. Feels horse. Denies wheezing but it was harder to run and get a big enough breath. Hx of chronic sinusitis: Hx of 2 sinus surgeries- sees ENT PRN. Elevated BP:  Blood pressure has been stable. No symptoms concerning for end organ damage. Continues to exercise regularly. ADD: Doing well on Straterra. No side effects. Seems to be helping. No improvement with vyvanse up to 40mg. 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 Increased dose from 30 - 40mg and she hasn't noticed a huge difference.      Anxiety/depression: The patient is stable on Wellbutrin. No suicidal ideation.      OA R hip: s/p replacement 2012.     HM:  Sees GYN. Sees a chiropractor for general well being. - had labs and he has her on several supplements. LMP year ago. Sees derm [place].      SH: 11/2019: goes back to school in Jan. 8/2018 Lives with  and one of her children- Juan Antonio Mederos will become a patient here as well.  2 of her sons and one of her daughters is out of the house.  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  Former patient of Dr. Shi Jones the 1960s. 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        Social History     Socioeconomic History    Marital status:      Spouse name: Not on file    Number of children: Not on file    Years of education: Not on file    Highest education level: Not on file   Occupational History    Not on file   Social Needs    Financial resource strain: Not on file    Food insecurity     Worry: Not on file     Inability: Not on file    Transportation needs     Medical: Not on file     Non-medical: Not on file   Tobacco Use    Smoking status: Never Smoker    Smokeless tobacco: Never Used   Substance and Sexual Activity    Alcohol use:  Yes    Drug use: No    Sexual activity: Yes   Lifestyle    Physical activity Days per week: Not on file     Minutes per session: Not on file    Stress: Not on file   Relationships    Social connections     Talks on phone: Not on file     Gets together: Not on file     Attends Latter day service: Not on file     Active member of club or organization: Not on file     Attends meetings of clubs or organizations: Not on file     Relationship status: Not on file    Intimate partner violence     Fear of current or ex partner: Not on file     Emotionally abused: Not on file     Physically abused: Not on file     Forced sexual activity: Not on file   Other Topics Concern    Not on file   Social History Narrative    Not on file         Review of Systems  As documented in the HPI. No cp or khurram. Physical Exam  Constitutional:       General: She is not in acute distress. Appearance: She is well-developed. HENT:      Head: Normocephalic and atraumatic. Right Ear: Tympanic membrane, ear canal and external ear normal. There is no impacted cerumen. Left Ear: Tympanic membrane, ear canal and external ear normal. There is no impacted cerumen. Nose: Nose normal.      Mouth/Throat:      Mouth: Mucous membranes are moist.      Pharynx: No oropharyngeal exudate or posterior oropharyngeal erythema. Eyes:      General:         Right eye: No discharge. Left eye: No discharge. Conjunctiva/sclera: Conjunctivae normal.   Neck:      Musculoskeletal: Neck supple. No neck rigidity or muscular tenderness. Thyroid: No thyromegaly. Trachea: No tracheal deviation. Cardiovascular:      Rate and Rhythm: Normal rate and regular rhythm. Heart sounds: Normal heart sounds. No murmur. Pulmonary:      Effort: Pulmonary effort is normal. No respiratory distress. Breath sounds: Normal breath sounds. No stridor. Lymphadenopathy:      Cervical: No cervical adenopathy. Skin:     General: Skin is warm and dry. Findings: No rash.    Neurological:      General: No focal deficit present. Mental Status: She is alert and oriented to person, place, and time. Psychiatric:         Behavior: Behavior normal.           Assessment/Plan     1. Sore throat  Throat has resolved. Now patient has a cough. Given albuterol to take as needed for the cough. Likely post viral cough. Also discussed considering Flonase if she has any drainage. If it does not improve she will let me know and we will consider a chest x-ray in a week or 2. Indications for follow-up discussed. 2. Attention deficit disorder (ADD) without hyperactivity  Stable. Continue current regimen. 3. Anxiety state  Stable. Continue current regimen. 4. Recurrent major depressive disorder, in full remission ([facility] [address]  Stable. Continue current regimen. 5. Essential hypertension  Stable. Continue current regimen. Discussed medications with patient, who voiced understanding of their use and indications. All questions answered.     Return in about 6 months (around 9/20/2020) for Physical.

## 2020-03-23 ENCOUNTER — TELEPHONE (OUTPATIENT)
Dept: FAMILY MEDICINE CLINIC | Age: 55
End: 2020-03-23

## 2020-03-23 ENCOUNTER — NURSE TRIAGE (OUTPATIENT)
Dept: OTHER | Facility: CLINIC | Age: 55
End: 2020-03-23

## 2020-03-24 ENCOUNTER — NURSE TRIAGE (OUTPATIENT)
Dept: OTHER | Facility: CLINIC | Age: 55
End: 2020-03-24

## 2020-03-31 ENCOUNTER — PATIENT MESSAGE (OUTPATIENT)
Dept: FAMILY MEDICINE CLINIC | Age: 55
End: 2020-03-31

## 2020-03-31 NOTE — TELEPHONE ENCOUNTER
From: Stiven Marroquin  To: Ekta Waddell MD  Sent: 3/31/2020 1:10 PM EDT  Subject: Non-Urgent Medical Question    To whom it may concern: There is a notification that I missed an appointment on 3/24/20 to the walk in flu clinic? I did a drive thru strep test, as directed by the nurse I spoke with on that date, because Dr. Olinda Escalante was not seeing patients who were ill.

## 2020-04-02 RX ORDER — ATOMOXETINE 40 MG/1
CAPSULE ORAL
Qty: 30 CAPSULE | Refills: 0 | Status: SHIPPED | OUTPATIENT
Start: 2020-04-02

## 2020-04-09 RX ORDER — BUPROPION HYDROCHLORIDE 300 MG/1
TABLET ORAL
Qty: 30 TABLET | Refills: 0 | Status: SHIPPED | OUTPATIENT
Start: 2020-04-09 | End: 2020-05-15

## 2020-04-13 RX ORDER — HYDROCHLOROTHIAZIDE 25 MG/1
TABLET ORAL
Qty: 30 TABLET | Refills: 0 | Status: SHIPPED | OUTPATIENT
Start: 2020-04-13

## 2020-04-15 NOTE — TELEPHONE ENCOUNTER
Medication:   Requested Prescriptions     Pending Prescriptions Disp Refills    VENTOLIN  (90 Base) MCG/ACT inhaler [Pharmacy Med Name: VENTOLIN HFA 90 MCG INHALER] 18 Inhaler 0     Sig: INHALE 2 PUFFS INTO THE LUNGS 4 TIMES DAILY AS NEEDED FOR WHEEZING OR SHORTNESS OF BREATH        Last Filled:  3/20/2020 1 inhaler w/0    Patient Phone Number: [phone] (home)     Last appt: 3/20/2020   Next appt: Visit date not found    Last OARRS:   RX Monitoring 5/2/2019   Attestation The Prescription Monitoring Report for this patient was reviewed today. Periodic Controlled Substance Monitoring Possible medication side effects, risk of tolerance/dependence & alternative treatments discussed. ;No signs of potential drug abuse or diversion identified: otherwise, see note documentation

## 2020-05-15 RX ORDER — BUPROPION HYDROCHLORIDE 300 MG/1
TABLET ORAL
Qty: 30 TABLET | Refills: 0 | Status: SHIPPED | OUTPATIENT
Start: 2020-05-15 | End: 2020-06-22

## 2020-06-22 RX ORDER — BUPROPION HYDROCHLORIDE 300 MG/1
TABLET ORAL
Qty: 30 TABLET | Refills: 2 | Status: SHIPPED | OUTPATIENT
Start: 2020-06-22 | End: 2020-10-02

## 2020-10-02 RX ORDER — BUPROPION HYDROCHLORIDE 300 MG/1
TABLET ORAL
Qty: 30 TABLET | Refills: 2 | Status: SHIPPED | OUTPATIENT
Start: 2020-10-02

## 2020-12-15 ENCOUNTER — OFFICE VISIT (OUTPATIENT)
Dept: ORTHOPEDIC SURGERY | Age: 55
End: 2020-12-15
Payer: COMMERCIAL

## 2020-12-15 VITALS — HEIGHT: 63 IN | TEMPERATURE: 97.7 F | BODY MASS INDEX: 19.14 KG/M2 | WEIGHT: 108 LBS

## 2020-12-15 PROCEDURE — G8427 DOCREV CUR MEDS BY ELIG CLIN: HCPCS | Performed by: ORTHOPAEDIC SURGERY

## 2020-12-15 PROCEDURE — 20610 DRAIN/INJ JOINT/BURSA W/O US: CPT | Performed by: ORTHOPAEDIC SURGERY

## 2020-12-15 PROCEDURE — 3017F COLORECTAL CA SCREEN DOC REV: CPT | Performed by: ORTHOPAEDIC SURGERY

## 2020-12-15 PROCEDURE — 99203 OFFICE O/P NEW LOW 30 MIN: CPT | Performed by: ORTHOPAEDIC SURGERY

## 2020-12-15 PROCEDURE — G8484 FLU IMMUNIZE NO ADMIN: HCPCS | Performed by: ORTHOPAEDIC SURGERY

## 2020-12-15 PROCEDURE — 1036F TOBACCO NON-USER: CPT | Performed by: ORTHOPAEDIC SURGERY

## 2020-12-15 PROCEDURE — G8420 CALC BMI NORM PARAMETERS: HCPCS | Performed by: ORTHOPAEDIC SURGERY

## 2020-12-15 RX ORDER — METHYLPREDNISOLONE ACETATE 40 MG/ML
40 INJECTION, SUSPENSION INTRA-ARTICULAR; INTRALESIONAL; INTRAMUSCULAR; SOFT TISSUE ONCE
Status: COMPLETED | OUTPATIENT
Start: 2020-12-15 | End: 2020-12-15

## 2020-12-15 RX ADMIN — METHYLPREDNISOLONE ACETATE 40 MG: 40 INJECTION, SUSPENSION INTRA-ARTICULAR; INTRALESIONAL; INTRAMUSCULAR; SOFT TISSUE at 15:22

## 2020-12-15 NOTE — PROGRESS NOTES
[address] [facility]  Office Visit  New Patient  Date:  12/15/2020    Name:  Sarah Vides  Address:  [address] [facility], [address]    :  1965      Age:   54 y.o.    SSN:  [id number]      Medical Record Number:  <[id number]>    Chief Complaint:    Left knee pain    HPI:   Sarah Vides is a 54 y.o. female who presents for evaluation of left knee pain. Patient states in 2018 she did have a fall and had subsequent knee pain. After a trial of conservative management with Dr. Curt Mckeon an MRI was ordered which did show a medial meniscal root tear and medial tibial plateau insufficiency fracture. She underwent an arthroscopy with Dr. Curt Mckeon for a medial meniscus root excision as well as a subchondral bone graft augmentation of her insufficiency fracture. She states she went on to make a full recovery and was not having any issues at all up until this last March. Patient states she did not have an injury however while running started having significant medial sided knee pain. She states the pain is very sharp and can radiate both distally as well as proximally. She states she does have pain at rest however the pain is most prominent while running or after periods of increased activity. She has been taking Aleve and using ice for pain relief. She states she is still working out however needs to modify her activities. She feels the pain is getting worse. Prior to this pain she is very active and was doing CrossFit. She denies any new numbness, tingling, fevers, chills, chest pain, shortness of breath, or any other new significant symptoms. Pain Assessment  Location of Pain: Knee  Location Modifiers: Left  Severity of Pain: 8  Quality of Pain: Sharp, Aching  Duration of Pain: Persistent  Frequency of Pain: Constant  Aggravating Factors:  Other (Comment)(running, quick pivots)  Relieving Factors: Rest, Ice  Result of Injury: No  Work-Related paternal grandmother; Substance Abuse in her brother. Current Outpatient Medications:     buPROPion (WELLBUTRIN XL) 300 MG extended release tablet, TAKE 1 TABLET BY MOUTH EVERY DAY IN THE MORNING, Disp: 30 tablet, Rfl: 2    VENTOLIN  (90 Base) MCG/ACT inhaler, INHALE 2 PUFFS INTO THE LUNGS 4 TIMES DAILY AS NEEDED FOR WHEEZING OR SHORTNESS OF BREATH, Disp: 18 Inhaler, Rfl: 2    hydroCHLOROthiazide (HYDRODIURIL) 25 MG tablet, TAKE 1 TABLET BY MOUTH EVERY DAY IN THE MORNING, Disp: 30 tablet, Rfl: 0    atomoxetine (STRATTERA) 40 MG capsule, TAKE 1 CAPSULE BY MOUTH EVERY DAY, Disp: 30 capsule, Rfl: 0    multivitamin (THERAGRAN) per tablet, Take 1 tablet by mouth daily. , Disp: , Rfl:       Allergies   Allergen Reactions    Codeine     Phenergan [Promethazine Hcl]     Sulfa Antibiotics     Promethazine Other (See Comments)     CONVULSIONS    Sulfamethoxazole-Trimethoprim Hives         Review of Systems: A 14 point review of systems available in the scanned medical record as documented by the patient on 12/15/20. Today's review pertinent items are noted in HPI. Review of Systems   Cardiovascular:        High blood pressure, poor circulation    Musculoskeletal:        Fractures or other injuries    Psychiatric/Behavioral:        Depression or other problems    All other systems reviewed and are negative. Physical Exam:  Temp 97.7 °F (36.5 °C) (Infrared)   Ht 5' 3\" (1.6 m)   Wt 108 lb (49 kg)   BMI 19.13 kg/m²     General: No acute distress, well nourished  CV: No obvious peripheral edema. Normal peripheral pulses  Neuro: Alert & oriented x 3  Psych: Normal mood and affect    Knee Examination: Left      Inspection: No edema or effusion  Alignment: Normal  Palpation: There is  mild patellofemoral crepitus, there is  medial joint line tenderness. Range of Motion:   0-130  Strength:   Motor is grossly intact  Stability: 5mm medial and 0 mm lateral opening, negative lachman  Special Tests:    Negative Phan, patient does have restricted medial lateral glide of the patella less than 5 mm  Gait:  Normal  Neuro: Sensation equal bilateral lower extremities   Vascular: 2+ posterior tibialis pulse        Contralateral Knee Comparison Examination: Right     Inspection:  No edema or effusion  Alignment: Normal  Palpation: There is  mild patellofemoral crepitus, there is  no medial or lateral joint line tenderness. Range of Motion:   0-130 degrees  Strength: grossly intact  Stability: Knee stable with varus valgus testing at 0 and 30 degrees, negative Lachman, negative posterior drawer  Special Tests:    Negative Phan  Gait:  Normal   Neuro: Sensation equal bilateral lower extremities  Vascular: 2+ posterior tibialis pulse    Radiographic:  X-rays obtained and reviewed in office, reviewed and interpreted by me today:  Views: 3 views  Location: Left knee  Impression: Patient has significant medial joint space narrowing. Evidence of prior subchondral bone augmentation noted with increased sclerosis below the medial tibial joint space. Patient does have a bony avulsion laterally patella on the sunrise views. Assessment:  Sugey Parker is a 54 y.o. female with:  1. Left knee osteoarthritis, most prominently in the medial compartment    Impression:  No diagnosis found. Office Procedures:  No orders of the defined types were placed in this encounter. Plan:   Pertinent imaging was reviewed. The etiology, natural history, and treatment options for the disorder were discussed. The roles of activity modifications, medications, injections, physical therapy, and surgical interventions were all described to the patient and questions were answered. X-rays reviewed with patient today she does have significant medial joint space narrowing of her left knee indicative of osteoarthritis.   We discussed that having a meniscal root debridement does place increased stress over the medial compartment and does lead to rapid osteoarthritis which seems to be her case. We would recommend a steroid injection for the left knee today. Patient was agreeable to this, see procedure note. Did discuss the patient continue taking over-the-counter anti-inflammatory medications, maintaining a healthy weight and continuing with her activity modification. She can follow-up on an as-needed basis. Procedure note-left knee intra-articular steroid injection    The inferior lateral portion of the knee was sterilely prepped with chlorhexidine. Next a solution containing 2 cc of Depo-Medrol was injected into the left knee joint. Needle was withdrawn and Band-Aid applied. Patient tolerated procedure well. [id number] [facility] Surgeon  [facility] Fellow    The encounter with Teresa Zavala was supervised by Dr Martha Youngblood who personally examined the patient and reviewed the plan. This dictation was performed with a verbal recognition program (DRAGON) and it was checked for errors. It is possible that there are still dictated errors within this office note. If so, please bring any errors to my attention for an addendum. All efforts were made to ensure that this office note is accurate. This dictation was performed with a verbal recognition program (DRAGON) and it was checked for errors. It is possible that there are still dictated errors within this office note. If so, please bring any errors to my attention for an addendum. All efforts were made to ensure that this office note is accurate. Supervising Physician Attestation:  I, Dr. Jelena Mendoza, personally performed the services described in this documentation as scribed above, and it is both accurate and complete and I agree with all pertinent clinical information. I personally interviewed the patient and performed a physical examination and medical decision making.  I discussed the patient's condition and treatment options and have  reviewed and agree with the past medical, family and social history unless otherwise noted. All of the patient's questions were answered.       Board Certified Orthopaedic Surgeon  [address] [facility] and [address] and [address] [employer]  Professor of [address]

## 2020-12-15 NOTE — PROGRESS NOTES
Review of Systems   Cardiovascular:        High blood pressure, poor circulation    Musculoskeletal:        Fractures or other injuries    Psychiatric/Behavioral:        Depression or other problems    All other systems reviewed and are negative.

## 2021-03-25 ENCOUNTER — OFFICE VISIT (OUTPATIENT)
Dept: ORTHOPEDIC SURGERY | Age: 56
End: 2021-03-25
Payer: COMMERCIAL

## 2021-03-25 VITALS — WEIGHT: 108.03 LBS | BODY MASS INDEX: 19.14 KG/M2 | TEMPERATURE: 97.1 F | HEIGHT: 63 IN

## 2021-03-25 DIAGNOSIS — M25.562 LEFT KNEE PAIN, UNSPECIFIED CHRONICITY: ICD-10-CM

## 2021-03-25 DIAGNOSIS — M17.12 PRIMARY OSTEOARTHRITIS OF LEFT KNEE: Primary | ICD-10-CM

## 2021-03-25 PROCEDURE — 1036F TOBACCO NON-USER: CPT | Performed by: PHYSICIAN ASSISTANT

## 2021-03-25 PROCEDURE — 99213 OFFICE O/P EST LOW 20 MIN: CPT | Performed by: PHYSICIAN ASSISTANT

## 2021-03-25 PROCEDURE — G8427 DOCREV CUR MEDS BY ELIG CLIN: HCPCS | Performed by: PHYSICIAN ASSISTANT

## 2021-03-25 PROCEDURE — G8420 CALC BMI NORM PARAMETERS: HCPCS | Performed by: PHYSICIAN ASSISTANT

## 2021-03-25 PROCEDURE — 3017F COLORECTAL CA SCREEN DOC REV: CPT | Performed by: PHYSICIAN ASSISTANT

## 2021-03-25 PROCEDURE — G8484 FLU IMMUNIZE NO ADMIN: HCPCS | Performed by: PHYSICIAN ASSISTANT

## 2021-03-25 PROCEDURE — 20610 DRAIN/INJ JOINT/BURSA W/O US: CPT | Performed by: PHYSICIAN ASSISTANT

## 2021-03-25 RX ORDER — METHYLPREDNISOLONE ACETATE 40 MG/ML
40 INJECTION, SUSPENSION INTRA-ARTICULAR; INTRALESIONAL; INTRAMUSCULAR; SOFT TISSUE ONCE
Status: COMPLETED | OUTPATIENT
Start: 2021-03-25 | End: 2021-03-25

## 2021-03-25 RX ORDER — LIDOCAINE HYDROCHLORIDE 10 MG/ML
20 INJECTION, SOLUTION INFILTRATION; PERINEURAL ONCE
Status: COMPLETED | OUTPATIENT
Start: 2021-03-25 | End: 2021-03-25

## 2021-03-25 RX ADMIN — LIDOCAINE HYDROCHLORIDE 20 ML: 10 INJECTION, SOLUTION INFILTRATION; PERINEURAL at 11:55

## 2021-03-25 RX ADMIN — METHYLPREDNISOLONE ACETATE 40 MG: 40 INJECTION, SUSPENSION INTRA-ARTICULAR; INTRALESIONAL; INTRAMUSCULAR; SOFT TISSUE at 11:55

## 2021-03-25 NOTE — LETTER
[address]  [facility] [address]  Phone: [phone]  Fax: [phone]    Tien Brown        March 25, 2021     Patient: Kishor Adame   YOB: 1965   Date of Visit: 3/25/2021       To Whom It May Concern:    I saw Leeann Figueroa in my clinic for evaluation and treatment for her left knee pain. 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 It is my medical opinion that Leeann Figueroa may return to full duty immediately with no restrictions on 03/27/21. If you have any questions or concerns, please don't hesitate to call.     Sincerely,        Daniel Pat PA-C

## 2021-03-27 NOTE — PROGRESS NOTES
12 [facility]  History and Physical  Knee Pain    Date:  3/25/2021    Name:  Amanda Dowd  Address:  [address] [place] [address], [address] [place] [address]    :  1965      Age:   64 y.o.    SSN:  [id number]      Medical Record Number:  <[id number]>      Chief Complaint    Follow-up (left knee, requesting cortisone injection )      History of Present Illness:  Amanda Dowd is a 64 y.o. female who presents for evaluation of her chronic left knee pain. Patient has a past medical history with a PMHx of a  medial meniscal root tear and medial tibial plateau insufficiency fracture with a subsequent medial meniscus root excision as well as a subchondral bone graft augmentation of her insufficiency fracture in 2018. She states that she recovered well from the surgery and did not have symptoms until several months before her first visit with us. Patient was last seen on 12/15/2020 at which time she was diagnosed with osteoarthritis in the left knee. She had a corticosteroid injection administered at that time. Patient states she feels that her pain significantly improved with cortisone. She has attempted to return to light running and conducts most of this on a treadmill. Over the past week or so she has noted insidious return of her symptoms which has limited her ability to exercise. She denies any mechanical symptoms or instability. The patient denies any new injury. The patient denies any catching, giving way, joint locking, numbness, paresthesias, or weakness. Pain Assessment  Location of Pain: Knee  Location Modifiers: Left  Severity of Pain: 7  Quality of Pain: Sharp, Aching  Duration of Pain: Persistent  Frequency of Pain: Constant  Aggravating Factors:  Other (Comment), Stairs(sudden twist, running)  Relieving Factors: Rest, Nsaids  Result of Injury: No  Work-Related Injury: No  Are there other pain locations you wish to document?: No    Past Medical Diabetes Sister     High Blood Pressure Brother     High Cholesterol Brother     High Blood Pressure Brother        Social History     Socioeconomic History    Marital status:      Spouse name: Not on file    Number of children: Not on file    Years of education: Not on file    Highest education level: Not on file   Occupational History    Not on file   Social Needs    Financial resource strain: Not on file    Food insecurity     Worry: Not on file     Inability: Not on file   [employer] needs     Medical: Not on file     Non-medical: Not on file   Tobacco Use    Smoking status: Never Smoker    Smokeless tobacco: Never Used   Substance and Sexual Activity    Alcohol use:  Yes    Drug use: No    Sexual activity: Yes   Lifestyle    Physical activity     Days per week: Not on file     Minutes per session: Not on file    Stress: Not on file   Relationships    Social connections     Talks on phone: Not on file     Gets together: Not on file     Attends Orthodoxy service: Not on file     Active member of club or organization: Not on file     Attends meetings of clubs or organizations: Not on file     Relationship status: Not on file    Intimate partner violence     Fear of current or ex partner: Not on file     Emotionally abused: Not on file     Physically abused: Not on file     Forced sexual activity: Not on file   Other Topics Concern    Not on file   Social History Narrative    Not on file       Current Outpatient Medications   Medication Sig Dispense Refill    buPROPion (WELLBUTRIN XL) 300 MG extended release tablet TAKE 1 TABLET BY MOUTH EVERY DAY IN THE MORNING 30 tablet 2    VENTOLIN  (90 Base) MCG/ACT inhaler INHALE 2 PUFFS INTO THE LUNGS 4 TIMES DAILY AS NEEDED FOR WHEEZING OR SHORTNESS OF BREATH 18 Inhaler 2    hydroCHLOROthiazide (HYDRODIURIL) 25 MG tablet TAKE 1 TABLET BY MOUTH EVERY DAY IN THE MORNING 30 tablet 0    atomoxetine (STRATTERA) 40 MG capsule TAKE 1 CAPSULE BY MOUTH EVERY DAY 30 capsule 0    multivitamin (THERAGRAN) per tablet Take 1 tablet by mouth daily. No current facility-administered medications for this visit. Allergies   Allergen Reactions    Codeine     Phenergan [Promethazine Hcl]     Sulfa Antibiotics     Promethazine Other (See Comments)     CONVULSIONS    Sulfamethoxazole-Trimethoprim Hives         Review of Systems:  A 14 point review of systems was completed by the patient and is available in the media section of the scanned medical record and was reviewed on 3/27/2021. The review is negative with the exception of those things mentioned in the HPI and Past Medical History         Vital Signs:   Temp 97.1 °F (36.2 °C) (Infrared)   Ht 5' 2.99\" (1.6 m)   Wt 108 lb 0.4 oz (49 kg)   BMI 19.14 kg/m²     General Exam:    General: Denis Curran is a healthy and well appearing 64y.o. year old female who is sitting comfortably in our office in no acute distress. Neuro: Alert & Oriented x 3,  normal,  no focal deficits noted. Normal mood & affect. Eyes: Sclera clear  Ears: Normal external ear  Mouth: Patient wearing a mask  Pulm: Respirations unlabored and regular   Skin: Warm, well perfused      Knee Examination: Left    Inspection: No effusion, ecchymosis, discoloration, erythema, excessive warmth. no gross deformities, no signs of infection. Palpation: There is patellofemoral crepitus, there is medial joint line tenderness. No other osseous or soft tissue tenderness around the knee. Negative calf tenderness    Range of Motion: 0-140 degrees without pain or difficulty    Strength:  5/5 quadriceps, 5/5 hamstrings    Special Tests: No ligament instability, valgus and varus stress test are stable at 0 and 30°. Lachman's exhibits a solid endpoint. Negative posterior drawer. Negative Homans sign. Tenderness with patellar grind.     Gait: Steady, Non antalgic, without the use of assistive devices    Alignment: Neutral    Neuro: Sensation equal bilateral lower extremities    Vascular: 2+ posterior tibialis pulse        Office Procedures:  Orders Placed This Encounter   Procedures    DE ARTHROCENTESIS ASPIR&/INJ MAJOR JT/BURSA W/O US     After informed consent was provided, the patient was seated on the exam table with the left knee flexed to 90 degrees. The anterolateral aspect of the knee adjacent to the joint line was prepped with Chlora-prep. The skin and subcutaneous tissues were anesthetized with ethyl chloride spray. A 22-gauge needle was inserted into the left knee and 2 ml of 40 mg/ml DepoMedrol was injected. The needle was withdrawn and the puncture site sealed with a Band-Aid. The patient tolerated the procedure well. Post injection instructions and precautions given and any problems to notify us. Assessment: Patient is a 64 y.o. female presenting to the office for follow-up evaluation of her left knee pain. Patient is being treated with conservative therapy for the arthritis in her left knee. Visit Diagnoses       Codes    Left knee pain, unspecified chronicity    -  Primary M25.562    Primary osteoarthritis of left knee     M17.12          Orders Placed This Encounter   Medications    methylPREDNISolone acetate (DEPO-MEDROL) injection 40 mg    lidocaine 1 % injection 20 mL       Medical decision making:  Patient's workup and evaluation were reviewed with the patient in the office today. Previously obtain x-ray imaging was once again reviewed with the patient. I believe the patient will continue to do well with conservative therapy. She was educated on postinjection precautions after having a corticosteroid injection administered today. She was given precautions that she should observe given the amount of running she likes to conduct in addition to the wear and deterioration she has already within the left knee. All the patient's questions were answered while in the clinic.   The patient is understanding of all instructions and agrees with the plan. Treatment Plan:    1. Observe postinjection precautions  2. Activity modification  3. Ice 20 minutes ever 1-2 hours PRN  4. Take medications as prescribed/instructed  5. Rest   6. Elevation at least 2 inches above the heart with pillows supporting the joint underneath  7. Lightweight exercise/low impact exercise  8. Appropriate diet/weight management      Follow up: PRN    This patient exhibits limited complexity for obtaining an independent history, reviewing past medical records, independent interpretation of diagnostic imaging, and further coordinating care. The patient exhibits low risk. Approximately 20 minutes were spent reviewing past medical records, imaging, educating the patient, and coordinating care. Approximately 50% of this time was spent with the patient face-to-face. Serina Damon PA-C    Physician Assistant - Certified    50/10/98 12:32 PM      This dictation was performed with a verbal recognition program (DRAGON) and it was checked for errors. It is possible that there are still dictated errors within this office note. If so, please bring any errors to my attention for an addendum. All efforts were made to ensure that this office note is accurate.

## 2022-02-15 NOTE — TELEPHONE ENCOUNTER
Medication:   Requested Prescriptions     Pending Prescriptions Disp Refills    atomoxetine (STRATTERA) 40 MG capsule [Pharmacy Med Name: ATOMOXETINE HCL 40 MG CAPSULE] 30 capsule 0     Sig: TAKE 1 CAPSULE BY MOUTH EVERY DAY        Last Filled:  02.10.2020 #30    Patient Phone Number: [phone] (home)      Last appt: 11/12/2019  Return in about 3 months (around 2/12/2020) for HTN, , Medication Refill. Next appt: Visit date not found    Last OARRS:   RX Monitoring 5/2/2019   Attestation The Prescription Monitoring Report for this patient was reviewed today. Periodic Controlled Substance Monitoring Possible medication side effects, risk of tolerance/dependence & alternative treatments discussed. ;No signs of potential drug abuse or diversion identified: otherwise, see note documentation
Please call patient to schedule an appointment. -  Did not read last [facility] appointment reminder so please call.
FAMILY HISTORY:  No pertinent family history in first degree relatives

## 2023-04-16 NOTE — PROGRESS NOTES
CONSULTATION NOTE - GENERAL NEUROLOGY    CHIEF COMPLAINT  Recurrent seizures    HISTORY OF PRESENT ILLNESS  60 year old year old,male, seen in neurologic consultation on 4/16/2023.     Mr. 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  Patient suffered skull fracture and traumatic brain injury requiring surgery, craniotomy.  Following this patient started having seizures which were described as tensing of the right hemibody with visual disturbances followed by generalized tonic-clonic activity.  Patient has been tried on multiple antiepileptics including Dilantin, carbamazepine, topiramate which is led to either side effects or patient was unable to tolerate the medication.    Patient recently has been on Depakote and Vimpat.  Had recent recurrent seizures and dose of Vimpat was increased to 300 mg twice daily.  Dose of Depakote was also adjusted as patient was having hyperammonemia secondary to Depakote use.  Yesterday patient had a witnessed seizure while seated in a wheelchair at the nursing facility.  Patient reportedly slapped the wheelchair but did not have any head injuries.  CT scan of the brain did not show any significant interim changes.  Imaging of the cervical spine showed no new fractures with subacute type II C2 fracture noted.  Since admission patient had antiepileptic levels checked and had low therapeutic Depakote level.  Ammonia level was mildly elevated.  Patient was continued on his home medication regimen.  No seizures reported overnight.      HISTORIES:  Past Medical History:   Diagnosis Date   • Epilepsy (CMD)    • High cholesterol    • Hyperplasia of prostate    • Seizure (CMD)        Past Surgical History:   Procedure Laterality Date   • Brain surgery     • Fracture surgery         Family History   Problem Relation Age of Onset   • Addiction Issues Brother        Social History 
I have reviewed the above note.  I agree with the assessment and treatment plan
    Tobacco Use   • Smoking status: Some Days     Types: Pipe, Cigars   • Smokeless tobacco: Never   Vaping Use   • Vaping Use: never used   Substance Use Topics   • Alcohol use: Never   • Drug use: Yes     Types: Marijuana     Comment: medicinal cannibas       INPATIENT MEDICATIONS:  Current Facility-Administered Medications   Medication Dose Route Frequency Provider Last Rate Last Admin   • atorvastatin (LIPITOR) tablet 10 mg  10 mg Oral Daily Magan Iraheta MD   10 mg at 04/16/23 0929   • divalproex (DEPAKOTE) delayed release EC tablet 750 mg  750 mg Oral 2 times per day Magan Iraheta MD   750 mg at 04/16/23 0929   • divalproex (DEPAKOTE) delayed release EC tablet 500 mg  500 mg Oral Daily at noon Magan Iraheta MD       • LORazepam (ATIVAN) tablet 2 mg  2 mg Oral Nightly Magan Iraheta MD       • pantoprazole (PROTONIX) EC tablet 40 mg  40 mg Oral QAM AC Magan Iraheta MD   40 mg at 04/16/23 0929   • sertraline (ZOLOFT) tablet 25 mg  25 mg Oral Daily Magan Iraheta MD   25 mg at 04/16/23 0929   • tamsulosin (FLOMAX) capsule 0.4 mg  0.4 mg Oral Daily Magan Iraheta MD   0.4 mg at 04/16/23 0929   • aspirin (ECOTRIN) enteric coated tablet 81 mg  81 mg Oral QAM Magan Iraheta MD   81 mg at 04/16/23 0929   • levETIRAcetam (KepPRA) tablet 750 mg  750 mg Oral 2 times per day Osiel Garza MD       • lacosamide (VIMPAT) tablet 200 mg  200 mg Oral BID Osiel Garza MD       • sodium chloride (PF) 0.9 % injection 2 mL  2 mL Intracatheter 2 times per day Magan Iraheta MD   2 mL at 04/16/23 0929   • heparin (porcine) injection 5,000 Units  5,000 Units Subcutaneous 3 times per day Magan Iraheta MD   5,000 Units at 04/16/23 0616   • Potassium Standard Replacement Protocol (Levels 3.5 and lower)   Does not apply See Admin Instructions Magan Iraheta MD       • Magnesium Standard Replacement Protocol   Does not apply See Admin Instructions Magan Iraheta MD          Current Facility-Administered 
Medications   Medication Dose Route Frequency Provider Last Rate Last Admin      Current Facility-Administered Medications   Medication Dose Route Frequency Provider Last Rate Last Admin   • melatonin tablet 3 mg  3 mg Oral Nightly PRN Magan Iraheta MD       • HYDROcodone-acetaminophen (NORCO) 5-325 MG per tablet 1 tablet  1 tablet Oral Q8H PRN Magan Iraheta MD       • sodium chloride 0.9 % flush bag 25 mL  25 mL Intravenous PRN Magan Iraheta MD       • sodium chloride 0.9 % flush bag 25 mL  25 mL Intravenous PRN Magan Iraheta MD       • acetaminophen (TYLENOL) tablet 650 mg  650 mg Oral Q4H PRN Magan Iraheta MD       • docusate sodium-sennosides (SENOKOT S) 50-8.6 MG 2 tablet  2 tablet Oral Daily PRN Magan Iraheta MD       • naproxen (NAPROSYN) tablet 500 mg  500 mg Oral Q12H PRN Magan Iraheta MD       • LORazepam (ATIVAN) injection 2 mg  2 mg Intravenous Q8H PRN Magan Iraheta MD            HOME MEDICATIONS:  Prior to Admission medications    Medication Sig Start Date End Date Taking? Authorizing Provider   divalproex (DEPAKOTE) 250 MG delayed release EC tablet Take 3 tablets by mouth every 12 hours. 4/12/23  Yes Tiara Johnson MD   divalproex (DEPAKOTE) 500 MG delayed release EC tablet Take 1 tablet by mouth daily (at noon). Do not start before April 13, 2023. 4/13/23  Yes Tiara Johnson MD   lacosamide 150 MG Tab Take 2 tablets by mouth in the morning and 2 tablets in the evening. 4/12/23  Yes Tiara Johnson MD   melatonin 3 MG Take 1 tablet by mouth nightly as needed (insomnia). 4/12/23  Yes Tiara Johnson MD   acetaminophen (TYLENOL) 325 MG tablet Take 2 tablets by mouth every 4 hours as needed for Pain. 4/12/23  Yes Tiara Johnson MD   HYDROcodone-acetaminophen (NORCO) 5-325 MG per tablet Take 1 tablet by mouth every 8 hours as needed for Pain. 4/12/23  Yes Tiara Johnson MD   aspirin (ECOTRIN) 81 MG EC tablet Take 81 mg by mouth every morning.   Yes Outside Provider   atorvastatin (LIPITOR) 10 MG tablet 
aching. The pain is severe. The pain has been fluctuating since onset. Pertinent negatives include no inability to bear weight, loss of motion, loss of sensation, muscle weakness, numbness or tingling. The symptoms are aggravated by movement, weight bearing and palpation. She has tried ice for the symptoms. The treatment provided no relief. Review of Systems   Constitutional: Negative. HENT: Negative. Eyes: Negative. Respiratory: Negative. Cardiovascular: Positive for leg swelling. Gastrointestinal: Negative. Endocrine: Negative. Genitourinary: Negative. Musculoskeletal: Negative. LLE pain   Skin: Negative. Allergic/Immunologic: Negative. Neurological: Negative. Negative for tingling and numbness. Hematological: Negative. Psychiatric/Behavioral: Negative. Physical Exam   Constitutional: She is oriented to person, place, and time. She appears well-developed and well-nourished. HENT:   Head: Normocephalic. Cardiovascular: Normal rate and regular rhythm. Pulmonary/Chest: Effort normal and breath sounds normal.   Musculoskeletal:        Legs:  Neurological: She is alert and oriented to person, place, and time. Skin: Skin is warm and dry. Psychiatric: She has a normal mood and affect. Her behavior is normal. Judgment and thought content normal.         1. Pain of left lower extremity  The condition is deteriorating, will change treatment, investigate cause and make further recommendations when data back. XR TIBIA FIBULA LEFT (2 VIEWS)    CBC Auto Differential    Sedimentation Rate    VL Extremity Venous Left   2. Leg swelling  The condition is deteriorating, will change treatment, investigate cause and make further recommendations when data back.    CBC Auto Differential    VL Extremity Venous Left             Sandie Scott NP
Take 10 mg by mouth daily. 1/5/23  Yes Outside Provider   LORazepam (ATIVAN) 1 MG tablet Take 2 mg by mouth nightly. 1/20/23  Yes Outside Provider   sertraline (ZOLOFT) 25 MG tablet Take 25 mg by mouth daily. 2/23/23  Yes Outside Provider   ergocalciferol (DRISDOL) 1.25 mg (50,000 units) capsule Take 1.25 mg by mouth 1 day a week. On Mondays   Yes Outside Provider   omeprazole (PriLOSEC) 40 MG capsule Take 40 mg by mouth daily.   Yes Outside Provider   tamsulosin (FLOMAX) 0.4 MG Cap Take 0.4 mg by mouth daily.   Yes Outside Provider        ALLERGIES:  ALLERGIES:  No Known Allergies     REVIEW OF SYSTEMS :   Constitutional: Negative for fever, chills, change in appetite or fatigue.  Skin: Negative for rash or wounds.  HEENT: Negative for eye drainage, rhinorrhea, ear pain, sore throat or neck pain.  Respiratory: Negative cough, wheezing or shortness of breath.  Cardiovascular: Negative for chest pain, chest pressure, palpitations or diaphoresis.  Gastrointestinal: Negative for nausea, vomiting, diarrhea, abdominal pain, black or tarry stools.  Genitourinary: Negative for dysuria, urgency, frequency, hematuria or flank pain.  Extremities:  Negative for joint swelling or joint pain.  Neurologic:  Negative for change in sensory or motor function.  Negative for headache, change in gait, vertigo, vision or speech.  Endocrine: Negative for heat or cold intolerance, weight loss or gain.  Hematological: Negative for bleeding, bruising or lymphadenopathy.  Psychiatric: Negative for change in affect, anxiety, depression, mentation or sleep disturbance.    PHYSICAL EXAM  Visit Vitals  /71   Pulse 60   Temp 97.9 °F (36.6 °C) (Oral)   Resp 16   Ht 5' 6\" (1.676 m) Comment: Estimated.   Wt 64.7 kg (142 lb 10.2 oz)   SpO2 96%   BMI 23.02 kg/m²       General:  No acute distress.    Eye: Right pupil 2 mm in size with reaction to light.  Left eye with decreased visual acuity  HENT: S/p craniotomy, Normal hearing,   Neck:  Supple, 
Non-tender,   Respiratory:  Lungs are clear to auscultation, Respirations are non-labored,   Cardiovascular:  Normal rate, Regular rhythm, No murmur  Neurologic:    Neurologic exam: Patient alert, oriented to person place and time.  Speech is clear.  Patient is able to repeat and comprehend well.  Cranial nerve exam: Right pupil 2 mm in size with reaction to light.  Left eye with decreased visual acuity.  Facial strength and sensation intact, uvula midline, palatal evaluation normal, tongue movements were normal.  Normal hearing. Normal shoulder shrug strength.  Motor: Left upper extremity proximally was 2/5.   strength was 3+/5.  Left lower extremity proximally was 3+/5.  Foot dorsiflexion was 4/5.  Strength in the right upper and lower extremity was 5/5.  Sensations: Decreased to light touch and pinprick in the left upper and lower extremity   Reflexes:   No pathological reflexes noted.  Cerebellar: Finger to nose, rapid alternating movements were normal.  Cognition and Speech:  Oriented, speech is normal. Aware of current events. Able to name the president         Psychiatric:  Cooperative.        LABORATORY  I have reviewed the pertinent laboratory tests:    Recent Results (from the past 24 hour(s))   Electrocardiogram 12-Lead    Collection Time: 04/15/23  4:03 PM   Result Value Ref Range    Ventricular Rate EKG/Min (BPM) 58     Atrial Rate (BPM) 58     HI-Interval (MSEC) 140     QRS-Interval (MSEC) 90     QT-Interval (MSEC) 410     QTc 403     P Axis (Degrees) 64     R Axis (Degrees) 8     T Axis (Degrees) 54     REPORT TEXT       Sinus bradycardia  Otherwise normal ECG  Confirmed by SANTY COLBY MD ([id number]) on 4/16/2023 9:52:50 AM     GLUCOSE, BEDSIDE - POINT OF CARE    Collection Time: 04/15/23  4:05 PM   Result Value Ref Range    GLUCOSE, BEDSIDE - POINT OF CARE 135 (H) 70 - 99 mg/dL   Light Blue Top    Collection Time: 04/15/23  4:09 PM   Result Value Ref Range    Extra Tube Hold for Add Ons  
  Light Green Top    Collection Time: 04/15/23  4:09 PM   Result Value Ref Range    Extra Tube Hold for Add Ons    Lavender Top    Collection Time: 04/15/23  4:09 PM   Result Value Ref Range    Extra Tube Hold for Add Ons    Gold Top    Collection Time: 04/15/23  4:09 PM   Result Value Ref Range    Extra Tube Hold for Add Ons    Pink Top Tube    Collection Time: 04/15/23  4:09 PM   Result Value Ref Range    Extra Tube Hold for Add Ons    Pink Top Tube    Collection Time: 04/15/23  4:09 PM   Result Value Ref Range    Extra Tube Hold for Add Ons    Comprehensive Metabolic Panel    Collection Time: 04/15/23  4:09 PM   Result Value Ref Range    Fasting Status      Sodium 137 135 - 145 mmol/L    Potassium 4.2 3.4 - 5.1 mmol/L    Chloride 106 97 - 110 mmol/L    Carbon Dioxide 28 21 - 32 mmol/L    Anion Gap 7 7 - 19 mmol/L    Glucose 113 (H) 70 - 99 mg/dL    BUN 20 6 - 20 mg/dL    Creatinine 0.68 0.67 - 1.17 mg/dL    Glomerular Filtration Rate >90 >=60    BUN/Cr 29 (H) 7 - 25    Calcium 8.4 8.4 - 10.2 mg/dL    Bilirubin, Total 0.3 0.2 - 1.0 mg/dL    GOT/AST 14 <=37 Units/L    GPT/ALT 17 <64 Units/L    Alkaline Phosphatase 69 45 - 117 Units/L    Albumin 2.9 (L) 3.6 - 5.1 g/dL    Protein, Total 6.2 (L) 6.4 - 8.2 g/dL    Globulin 3.3 2.0 - 4.0 g/dL    A/G Ratio 0.9 (L) 1.0 - 2.4   CBC with Automated Differential (performable only)    Collection Time: 04/15/23  4:09 PM   Result Value Ref Range    WBC 7.4 4.2 - 11.0 K/mcL    RBC 3.44 (L) 4.50 - 5.90 mil/mcL    HGB 10.9 (L) 13.0 - 17.0 g/dL    HCT 33.3 (L) 39.0 - 51.0 %    MCV 96.8 78.0 - 100.0 fl    MCH 31.7 26.0 - 34.0 pg    MCHC 32.7 32.0 - 36.5 g/dL    RDW-CV 14.6 11.0 - 15.0 %    RDW-SD 51.2 (H) 39.0 - 50.0 fL     140 - 450 K/mcL    NRBC 0 <=0 /100 WBC    Neutrophil, Percent 58 %    Lymphocytes, Percent 24 %    Mono, Percent 15 %    Eosinophils, Percent 2 %    Basophils, Percent 1 %    Immature Granulocytes 0 %    Absolute Neutrophils 4.3 1.8 - 7.7 K/mcL    Absolute 
Lymphocytes 1.8 1.0 - 4.0 K/mcL    Absolute Monocytes 1.1 (H) 0.3 - 0.9 K/mcL    Absolute Eosinophils  0.1 0.0 - 0.5 K/mcL    Absolute Basophils 0.1 0.0 - 0.3 K/mcL    Absolute Immature Granulocytes 0.0 0.0 - 0.2 K/mcL   Valproic Acid    Collection Time: 04/15/23  4:09 PM   Result Value Ref Range    Valproate 56 50 - 125 mcg/mL   Ammonia    Collection Time: 04/15/23  4:33 PM   Result Value Ref Range    Ammonia 45 (H) <=33 mcmol/L       IMAGING  I have reviewed the pertinent imaging/study reports.      CT HEAD WO CONTRAST   Final Result   No acute intracranial process.      HISTORY:Seizure, nontraumatic (Age >= 41y)   HA, break through seizure.      TECHNIQUE: Cervical spine CT protocol with sagittal and coronal   reconstructions of the cervical spine.      Comparison: April 1, 2023      FINDINGS:       Tensive operative changes are demonstrated of posterior cervical   instrumented fusion. This is demonstrated from C3 through C6 with pedicle   screws bilaterally. Vertical rods are noted. There is fracture through the   odontoid process with a type II C2 fracture which is relatively unchanged   since previous examination. No new fractures evident. There are laminectomy   changes are demonstrated from the level of C4-C6. Morselized bone graft is   noted. The skull base is normal.   There is no acute fracture.    There is no subluxation.   Vertebral body heights are maintained.    Disc spaces are normal. Atherosclerotic changes in the carotid   bifurcations.   There is no spinal canal hematoma. Evaluation for hematoma however is   limited given the instrumentation and beam hardening artifact.   Paraspinal soft tissues are normal.    Extensive emphysematous changes in the lung apices and upper lobes   bilaterally.      IMPRESSION:       No clear evidence of new fracture. Subacute type II C2 fracture again   noted. Extensive instrumented fusion of the cervical spine.   If there are persistent clinical symptoms, 
myelopathic or radicular   symptoms, recommend evaluation with MRI.         Electronically Signed by: CIPRIANO RODRIGUEZ MD    Signed on: 4/15/2023 8:19 PM    Workstation ID: [id number]      CT ABDOMEN PELVIS W CONTRAST - IV contrast only   Final Result      1.   Limited artifactual exam as explained above with no apparent   abnormalities to explain patient's symptoms.      2.   Interval increase in size of left hepatic lobe lesion.  Consider   further evaluation with nonemergent MRI of the liver.      Electronically Signed by: CHALO GERARDO MD    Signed on: 4/15/2023 9:35 PM    Workstation ID: [id number]      CT CERVICAL SPINE WO CONTRAST   Final Result   No acute intracranial process.      HISTORY:Seizure, nontraumatic (Age >= 41y)   HA, break through seizure.      TECHNIQUE: Cervical spine CT protocol with sagittal and coronal   reconstructions of the cervical spine.      Comparison: April 1, 2023      FINDINGS:       Tensive operative changes are demonstrated of posterior cervical   instrumented fusion. This is demonstrated from C3 through C6 with pedicle   screws bilaterally. Vertical rods are noted. There is fracture through the   odontoid process with a type II C2 fracture which is relatively unchanged   since previous examination. No new fractures evident. There are laminectomy   changes are demonstrated from the level of C4-C6. Morselized bone graft is   noted. The skull base is normal.   There is no acute fracture.    There is no subluxation.   Vertebral body heights are maintained.    Disc spaces are normal. Atherosclerotic changes in the carotid   bifurcations.   There is no spinal canal hematoma. Evaluation for hematoma however is   limited given the instrumentation and beam hardening artifact.   Paraspinal soft tissues are normal.    Extensive emphysematous changes in the lung apices and upper lobes   bilaterally.      IMPRESSION:       No clear evidence of new fracture. Subacute type II C2 
fracture again   noted. Extensive instrumented fusion of the cervical spine.   If there are persistent clinical symptoms, myelopathic or radicular   symptoms, recommend evaluation with MRI.         Electronically Signed by: CIPRIANO RODRIGUEZ MD    Signed on: 4/15/2023 8:19 PM    Workstation ID: [id number]      XR ELBOW 3 VIEWS BILATERAL   Final Result   FINDINGS/IMPRESSION:      No gross acute fracture or dislocation.      Evaluation with CT advised if there is a high clinical suspicion for   fractures.      Electronically Signed by: CHALO GERARDO MD    Signed on: 4/15/2023 6:45 PM    Workstation ID: [id number]           Results for orders placed during the hospital encounter of 06/07/22    EEG Routine; With Video    Narrative  ELECTROENCEPHALOGRAM REPORT:    REFERRING PHYSICIAN:  No ref. provider found    CLINICAL HISTORY:  This is a patient with history of posttraumatic seizures admitted after a seizure at home.    RESULTS:  The background rhythm in this patient consists of 7 to 8 Hz waveforms menadiol has not developed.  Focal slowing of 5 to 6 Hz is seen in the left hemisphere leads throughout the tracing.  Activation was not done.  Good sleep characteristic were not found.  No paroxysmal activity was found throughout the tracing.    INTERPRETATION:  This is abnormal EEG because of focal slowing in the left hemispheres indicative of underlying neurophysiologic dysfunction and likely related to patient's previous traumatic brain injury.  No epileptiform activity was found.    Akil Guerra MD  6/9/2022 4:38 PM     No results found for this or any previous visit.       IMPRESSION   60-year-old gentleman with history of traumatic brain injury and posttraumatic seizures presenting to the hospital for recurrent episode of seizure.  Patient is presently on valproic acid and Vimpat with therapeutic levels of valproic acid.  Given multiple recurrent episodes would like to add a third antiepileptic medication.  
Patient previously was on phenytoin, carbamazepine, topiramate which had led to side effects.  We will start patient on levetiracetam 750 mg twice daily  Continue Depakote 750 mg twice daily along with 500 mg in the afternoon  We will decrease dose of Vimpat to 200 mg twice daily.  Seizure and fall precautions.  If patient remains stable he could be discharged back to nursing home.  Recommend outpatient neurology follow-up for continued management of his epilepsy  Thank you for allowing me to participate in the care of this patient  Please page with questions or concerns.      Osiel Garza MD   (available via [employer])  [facility] - Neurology Attending

## 2023-05-09 ENCOUNTER — OFFICE VISIT (OUTPATIENT)
Dept: ORTHOPEDIC SURGERY | Age: 58
End: 2023-05-09
Payer: COMMERCIAL

## 2023-05-09 VITALS — HEIGHT: 63 IN | WEIGHT: 108 LBS | BODY MASS INDEX: 19.14 KG/M2

## 2023-05-09 DIAGNOSIS — M17.12 PRIMARY OSTEOARTHRITIS OF LEFT KNEE: ICD-10-CM

## 2023-05-09 DIAGNOSIS — G89.29 CHRONIC PAIN OF LEFT KNEE: Primary | ICD-10-CM

## 2023-05-09 DIAGNOSIS — M25.562 CHRONIC PAIN OF LEFT KNEE: Primary | ICD-10-CM

## 2023-05-09 PROCEDURE — 99204 OFFICE O/P NEW MOD 45 MIN: CPT | Performed by: ORTHOPAEDIC SURGERY

## 2023-05-09 PROCEDURE — 3017F COLORECTAL CA SCREEN DOC REV: CPT | Performed by: ORTHOPAEDIC SURGERY

## 2023-05-09 PROCEDURE — G8427 DOCREV CUR MEDS BY ELIG CLIN: HCPCS | Performed by: ORTHOPAEDIC SURGERY

## 2023-05-09 PROCEDURE — 1036F TOBACCO NON-USER: CPT | Performed by: ORTHOPAEDIC SURGERY

## 2023-05-09 PROCEDURE — G8420 CALC BMI NORM PARAMETERS: HCPCS | Performed by: ORTHOPAEDIC SURGERY

## 2023-05-09 RX ORDER — AMOXICILLIN AND CLAVULANATE POTASSIUM 500; 125 MG/1; MG/1
TABLET, FILM COATED ORAL
COMMUNITY

## 2023-05-09 RX ORDER — CEPHALEXIN 500 MG/1
CAPSULE ORAL
COMMUNITY
End: 2023-05-09

## 2023-05-09 RX ORDER — TRAMADOL HYDROCHLORIDE 50 MG/1
TABLET ORAL
COMMUNITY
End: 2023-05-09

## 2023-05-09 RX ORDER — ASPIRIN 325 MG
TABLET ORAL
COMMUNITY

## 2023-05-09 RX ORDER — DEXTROAMPHETAMINE SACCHARATE, AMPHETAMINE ASPARTATE MONOHYDRATE, DEXTROAMPHETAMINE SULFATE AND AMPHETAMINE SULFATE 5; 5; 5; 5 MG/1; MG/1; MG/1; MG/1
CAPSULE, EXTENDED RELEASE ORAL
COMMUNITY

## 2023-05-09 RX ORDER — VALACYCLOVIR HYDROCHLORIDE 500 MG/1
TABLET, FILM COATED ORAL
COMMUNITY

## 2023-05-09 RX ORDER — NITROFURANTOIN 25; 75 MG/1; MG/1
CAPSULE ORAL
COMMUNITY
End: 2023-05-09

## 2023-05-09 RX ORDER — FLUOROURACIL 50 MG/G
CREAM TOPICAL
COMMUNITY
End: 2023-05-09

## 2023-05-09 RX ORDER — PROMETHAZINE HYDROCHLORIDE 25 MG/1
TABLET ORAL
COMMUNITY
End: 2023-05-09

## 2023-05-09 RX ORDER — HYDROQUINONE 40 MG/G
CREAM TOPICAL
COMMUNITY
End: 2023-05-09

## 2023-05-09 RX ORDER — FLUCONAZOLE 150 MG/1
TABLET ORAL
COMMUNITY
End: 2023-05-09

## 2023-05-09 RX ORDER — PHENAZOPYRIDINE HYDROCHLORIDE 200 MG/1
TABLET, FILM COATED ORAL
COMMUNITY
End: 2023-05-09

## 2023-05-09 NOTE — PROGRESS NOTES
Guyann Skiff today for evaluation of chronic issues involving her left knee. She says her left knee issues started in 2018 with a patella fracture. She eventually had arthroscopy in 2018 by Dr. Kendell Montemayor at that time she also had a subchondroplasty procedure. She sought multiple consultations in the intervening years. It was recommended that she consider viscosupplementation. She eventually underwent another arthroscopy in June 2022 by Dr. Brian Escudero. She said that did not help much. She has had aspirations a number of times. She had a cortisone injection. She reports that another provider at [facility] gave her a \"gel\" shot in January of this year which has not been helpful either  Currently her pain is 6 out of 10. She is frustrated because she cannot run. She has pain with stairs and getting up from a seated position. She works as a nurse. Past history significant for depression and ADHD. History: Patient's relevant past family, medical, and social history are reviewed as part of today's visit. ROS of pertinent positives and negatives as above; otherwise negative. General Exam:    Vitals: Height 5' 3\" (1.6 m), weight 108 lb (49 kg), not currently breastfeeding. Constitutional: Patient is adequately groomed with no evidence of malnutrition  Mental Status: The patient is oriented to time, place and person. The patient's mood and affect are appropriate. Gait:  Patient walks with normal gait and station. Lymphatic: The lymphatic examination bilaterally reveals all areas to be without enlargement or induration. Vascular: Examination reveals no swelling or calf tenderness. Peripheral pulses are palpable and 2+. Neurological: The patient has good coordination. There is no weakness or sensory deficit. Skin:    Head/Neck: inspection reveals no rashes, ulcerations or lesions. Trunk:  inspection reveals no rashes, ulcerations or lesions.   Right Lower Extremity: inspection reveals no rashes,

## 2023-05-22 ENCOUNTER — OFFICE VISIT (OUTPATIENT)
Dept: ORTHOPEDIC SURGERY | Age: 58
End: 2023-05-22
Payer: COMMERCIAL

## 2023-05-22 VITALS — RESPIRATION RATE: 16 BRPM | BODY MASS INDEX: 19.14 KG/M2 | WEIGHT: 108 LBS | HEIGHT: 63 IN

## 2023-05-22 DIAGNOSIS — M17.12 PRIMARY OSTEOARTHRITIS OF LEFT KNEE: Primary | ICD-10-CM

## 2023-05-22 PROCEDURE — G8420 CALC BMI NORM PARAMETERS: HCPCS | Performed by: ORTHOPAEDIC SURGERY

## 2023-05-22 PROCEDURE — G8427 DOCREV CUR MEDS BY ELIG CLIN: HCPCS | Performed by: ORTHOPAEDIC SURGERY

## 2023-05-22 PROCEDURE — 99213 OFFICE O/P EST LOW 20 MIN: CPT | Performed by: ORTHOPAEDIC SURGERY

## 2023-05-22 PROCEDURE — 3017F COLORECTAL CA SCREEN DOC REV: CPT | Performed by: ORTHOPAEDIC SURGERY

## 2023-05-22 PROCEDURE — 1036F TOBACCO NON-USER: CPT | Performed by: ORTHOPAEDIC SURGERY

## 2023-05-22 NOTE — PROGRESS NOTES
[facility]  Office Visit    Chief Complaint: Left knee pain    HPI:  Josh Cotton is a 62 y.o. who is here for initial assessment of her left knee, sent as a referral from Dr. Jonny Chicas.  She has a long-term history of left knee issues, all made worse in 2022. There is no discrete injury in the past.  She has been a runner for 40 years. She has been unable to run for significant mount of time over the past 1 year. She can do most activities with only mild discomfort in the left knee but is unable to run. With increased activity, she reports left knee medial joint line pain. She has a history of right total hip arthroplasty. She denies left hip pain. She underwent left knee arthroscopy with Dr. Taylor Barreto at [place] last year. She also underwent left knee arthroscopy with medial tibial plateau subchondroplasty with Dr. Yessenia Peterson in 2018. She has also been more recently treated with A steroid injection and viscosupplementation injection, neither of which provided much long-term relief. She is otherwise healthy and walks without assistive device. She denies history of blood clots, diabetes, blood thinners, sleep apnea, tobacco use. She has help at home. Patient Active Problem List   Diagnosis    Allergic rhinitis    Anxiety state    Osteoarthritis of hip    Family history of coronary artery disease    Depressive disorder, not elsewhere classified    ADD (attention deficit disorder)    Recurrent major depressive disorder, in full remission ([facility] [address]    Acute recurrent sinusitis    Acute medial meniscal tear, left, subsequent encounter    Closed fracture of medial plateau of left tibia, initial encounter    Essential hypertension       ROS:  Constitutional: denies fever, chills, weight loss  MSK: denies pain in other joints, muscle aches  Neurological: denies numbness, tingling, weakness    Exam:  Resp.  rate 16, height 5' 3\" (1.6 m), weight 108 lb (49 kg)    Appearance: sitting in exam

## 2023-07-07 ENCOUNTER — TELEPHONE (OUTPATIENT)
Dept: ORTHOPEDIC SURGERY | Age: 58
End: 2023-07-07

## 2023-07-31 ENCOUNTER — HOSPITAL ENCOUNTER (OUTPATIENT)
Dept: PREADMISSION TESTING | Age: 58
Discharge: HOME | End: 2023-08-04
Payer: COMMERCIAL

## 2023-07-31 DIAGNOSIS — M17.12 PRIMARY OSTEOARTHRITIS OF LEFT KNEE: ICD-10-CM

## 2023-07-31 LAB
25(OH)D3 SERPL-MCNC: 73.6 NG/ML
ABO + RH BLD: NORMAL
ALBUMIN SERPL-MCNC: 5 G/DL (ref 3.4–5)
BACTERIA URNS QL MICRO: NORMAL /HPF
BILIRUB UR QL STRIP.AUTO: NEGATIVE
BLD GP AB SCN SERPL QL: NORMAL
CLARITY UR: ABNORMAL
COLOR UR: YELLOW
EPI CELLS #/AREA URNS AUTO: 1 /HPF (ref 0–5)
EST. AVERAGE GLUCOSE BLD GHB EST-MCNC: 99.7 MG/DL
GLUCOSE UR STRIP.AUTO-MCNC: NEGATIVE MG/DL
HBA1C MFR BLD: 5.1 %
HGB UR QL STRIP.AUTO: NEGATIVE
HYALINE CASTS #/AREA URNS AUTO: 0 /LPF (ref 0–8)
KETONES UR STRIP.AUTO-MCNC: NEGATIVE MG/DL
LEUKOCYTE ESTERASE UR QL STRIP.AUTO: NEGATIVE
NITRITE UR QL STRIP.AUTO: NEGATIVE
PH UR STRIP.AUTO: 8 [PH] (ref 5–8)
PREALB SERPL-MCNC: 30.9 MG/DL (ref 20–40)
PROT UR STRIP.AUTO-MCNC: NEGATIVE MG/DL
RBC CLUMPS #/AREA URNS AUTO: 0 /HPF (ref 0–4)
SP GR UR STRIP.AUTO: 1.02 (ref 1–1.03)
UA COMPLETE W REFLEX CULTURE PNL UR: ABNORMAL
UA DIPSTICK W REFLEX MICRO PNL UR: YES
URN SPEC COLLECT METH UR: ABNORMAL
UROBILINOGEN UR STRIP-ACNC: 0.2 E.U./DL
WBC #/AREA URNS AUTO: 1 /HPF (ref 0–5)

## 2023-07-31 PROCEDURE — 84134 ASSAY OF PREALBUMIN: CPT

## 2023-07-31 PROCEDURE — 87641 MR-STAPH DNA AMP PROBE: CPT

## 2023-07-31 PROCEDURE — 86850 RBC ANTIBODY SCREEN: CPT

## 2023-07-31 PROCEDURE — 81001 URINALYSIS AUTO W/SCOPE: CPT

## 2023-07-31 PROCEDURE — 86900 BLOOD TYPING SEROLOGIC ABO: CPT

## 2023-07-31 PROCEDURE — 82306 VITAMIN D 25 HYDROXY: CPT

## 2023-07-31 PROCEDURE — 86901 BLOOD TYPING SEROLOGIC RH(D): CPT

## 2023-07-31 PROCEDURE — 82040 ASSAY OF SERUM ALBUMIN: CPT

## 2023-07-31 PROCEDURE — 83036 HEMOGLOBIN GLYCOSYLATED A1C: CPT

## 2023-07-31 RX ORDER — DEXTROAMPHETAMINE SACCHARATE, AMPHETAMINE ASPARTATE, DEXTROAMPHETAMINE SULFATE AND AMPHETAMINE SULFATE 2.5; 2.5; 2.5; 2.5 MG/1; MG/1; MG/1; MG/1
10 TABLET ORAL DAILY
COMMUNITY

## 2023-07-31 NOTE — PROGRESS NOTES
Patient  attended JET class on 7/31/2023 . Patient verified surgery for Total Knee replacement. Patient  received patient information and educational JET folder including the following handouts: jet powerpoint, covid-19 restrictions, ERAS, incentive spirometry including purpose and how to perform, case management contact information, hand hygiene, preventing constipation, home health care agency list, skilled nursing facility list, pre-operative showering techniques and the use of anti-septic 3 days before surgery. Interviews completed by PT, OT, and Nurse Navigator. Labs and Tests completed as ordered/necessary. Anti-septic bottle given to patient to take home. Patient  states no further questions or concerns. Patient provided orthopedic office, case management, and nurse navigator contact information. Date Of Surgery: 8/9/2023  Surgeon: Dr Chelsea Romero  Per Patient Will see/Saw Primary care provider on 7/21/2023. HISTORY OF JOINT REPLACEMENT(S): Total Hip Replacement    DC Plan: Home    HOME ASSISTANCE - WHO WILL BE STAYING WITH YOU AT HOME FOR FIRST SEVERAL DAYS? spouse Jarrod Graham and son    DC TRANSPORTATION: spouse Jarrod Graham    STEPS INTO HOME: 0    STEPS TO BATHROOM/BEDROOM: 12 to second floor, has a bathroom on first and second floor. DME NEEDS:  Denies durable medical equipment needs at this time, already has a rolling walker. LENGTH OF STAY HAS BEEN DISCUSSED WITH THE PATIENT, APPROPRIATE TO HIS/ HER PROCEDURE. PATIENT HAS BEEN INFORMED THAT THEY WILL BE DISCHARGED WHEN THE PHYSICIAN DEEMS THEM MEDICALLY READY. MOST PATIENTS CAN EXPECT TO BE IN THE HOSPITAL ONE NIGHT AS AN OBSERVATION ONLY, OR 1-2 DAYS AS AN ADMISSION FOR THOSE WITH MEDICAL HEALTH ISSUES/COMPLICATIONS. CHOICES FOR HHC, DME VENDORS AND SKILLED/ REHAB FACILITIES PROVIDED TO PATIENT DURING THIS INTERVIEW. HOME CARE CHOICE(S): patient plans to go to outpatient therapy at [facility].  Instructed patient to make her first

## 2023-08-01 LAB — MRSA DNA SPEC QL NAA+PROBE: NORMAL

## 2023-08-01 NOTE — PROGRESS NOTES
[facility] Pre-Admission Testing Electronic Communication Worksheet for OR/ENDO Procedures        Patient: Klaus Judd    DOS: 8/9/23    Arrival Time:     Surgery Time:    Meds to Bed:  [x] YES    []  NO    Transportation Confirmed: [x] YES    []  NO Dennis Nascimento(spouse)    History and Physical:  [x] YES    []  NO  [] N/A  If yes, please list doctor or Urgent Care and date of H&P: Medical clearance per Dr Cirilo Castanon on 7/21/23 in [facility]    Additional Clearance(Cardiac, Pulmonary, etc):  [] YES    [x]  NO    Pre-Admission Testing Visit:  [x] YES    []  NO If no, do labs/testing need to be done DOS?   [] YES    [x]  NO    Medication Reconciliation Complete:  [x] YES    []  NO        Additional Notes:                Interview Complete: [x] YES    []  NO          Mehdi Yadav RN  12:36 PM

## 2023-08-01 NOTE — PROGRESS NOTES
PRE-OP INSTRUCTIONS     Do not eat or drink anything after 12:00 midnight prior to surgery. This includes water, chewing gum, mints and ice chips. You may brush your teeth and gargle the morning of surgery but DO  NOT SWALLOW THE WATER. Take the following medications with a small sip of water on the morning of procedure. Nelly Pope Follow your MD/Surgeons pre-procedure instructions regarding your medications     You may be asked to stop blood thinners such as:  Coumadin, Plavix, Fragmin and lovenox. Please check with your doctor before stopping these or any other medications. Aspirin, ibuprofen, advil and naproxen, any anti-inflammatory products should be stopped for a week prior to your surgery. Do not smoke and do not drink any alcoholic beverages 24 hours prior to your surgery. Please do not wear any jewelry or body piercings on the day of surgery. Please wear something simple, loose fitting clothing to the hospital.  Do not wear any make-up(including eye make-up) or nail polish on your fingers and toes. As part of our patient safety program to minimize surgical infections, we ask you to do the following:    Please notify your surgeon if you develop any illness between now and the day of your surgery. This includes a cough, cold, fever, sore  throat, nausea, vomiting, diarrhea, etc.   Please notify your surgeon if you experience dizziness, shortness of  breath or blurred vision between now and the time of your surgery. Please notify your surgeon of any open or redden areas that may look infected       DO NOT shave your operative site 96 hours(four days) prior to surgery. Shower the week before surgery with an antibacterial soap such as:dial,safeguard, etc.       Three(3) days prior to your surgery, cleanse the operative site with Hibiclens(anti-microbial soap).  This soap may dry your skin, please do not apply any oils or lotions     Please bring your insurance card and picture ID

## 2023-08-02 NOTE — PROGRESS NOTES
Notification sent to Dr Yoli Us and medical assistant Mu QUINN regarding abnormal preoperative labs and pertinent medical history.

## 2023-08-03 ENCOUNTER — OFFICE VISIT (OUTPATIENT)
Dept: ORTHOPEDIC SURGERY | Age: 58
End: 2023-08-03
Payer: COMMERCIAL

## 2023-08-03 VITALS — HEIGHT: 63 IN | BODY MASS INDEX: 19.49 KG/M2 | WEIGHT: 110 LBS

## 2023-08-03 DIAGNOSIS — M17.12 PRIMARY OSTEOARTHRITIS OF LEFT KNEE: Primary | ICD-10-CM

## 2023-08-03 PROCEDURE — 1036F TOBACCO NON-USER: CPT | Performed by: ORTHOPAEDIC SURGERY

## 2023-08-03 PROCEDURE — 99214 OFFICE O/P EST MOD 30 MIN: CPT | Performed by: ORTHOPAEDIC SURGERY

## 2023-08-03 PROCEDURE — G8427 DOCREV CUR MEDS BY ELIG CLIN: HCPCS | Performed by: ORTHOPAEDIC SURGERY

## 2023-08-03 PROCEDURE — 3017F COLORECTAL CA SCREEN DOC REV: CPT | Performed by: ORTHOPAEDIC SURGERY

## 2023-08-03 PROCEDURE — G8420 CALC BMI NORM PARAMETERS: HCPCS | Performed by: ORTHOPAEDIC SURGERY

## 2023-08-03 NOTE — PROGRESS NOTES
[facility]  Office Visit    Chief Complaint: Left knee pain    HPI:  Kallie Levy is a 62 y.o. who is here in follow-up for her left knee. She is scheduled for left knee medial compartment arthroplasty next week. For review, she has a long-term history of left knee issues, all made worse in 2022. There is no discrete injury in the past.  She has been a runner for 40 years. She has been unable to run for significant amount of time over the past year. She can do most activities with only mild discomfort in the left knee but is unable to run. With increased activity, she reports left knee medial joint line pain. She has a history of right total hip arthroplasty. She denies left hip pain. She underwent left knee arthroscopy with Dr. Demian Corral at [facility] last year. She also underwent left knee arthroscopy with medial tibial plateau subchondroplasty with Dr. Tania Kinsey in 2018. She has also been more recently treated with a steroid injection and viscosupplementation injection, neither of which provided much long-term relief. She is otherwise healthy and walks without assistive device. She denies history of blood clots, diabetes, blood thinners, sleep apnea, tobacco use. She has help at home.       Patient Active Problem List   Diagnosis    Allergic rhinitis    Anxiety state    Osteoarthritis of hip    Family history of coronary artery disease    Depressive disorder, not elsewhere classified    ADD (attention deficit disorder)    Recurrent major depressive disorder, in full remission ([facility])    Acute recurrent sinusitis    Acute medial meniscal tear, left, subsequent encounter    Closed fracture of medial plateau of left tibia, initial encounter    Essential hypertension       ROS:  Constitutional: denies fever, chills, weight loss  MSK: denies pain in other joints, muscle aches  Neurological: denies numbness, tingling, weakness    Exam:  Appearance: sitting in exam room chair, appears

## 2023-08-03 NOTE — DISCHARGE INSTRUCTIONS
Wear aramis hoses (compression stockings) for 2 weeks and to only remove when showering or at bedtime. Please wear Teds to follow up appointment with orthopedic surgeon. If you use a bi-pap/cpap for sleep apnea, please wear when sleeping while taking narcotics. Use your Incentive Spirometer 4 times a day (10 breaths) for 1 week after surgery to prevent pneumonia. Use ice packs as needed for swelling and pain. DO NOT apply ice directly to your skin. Use a clean towel or pillow case as a barrier between your skin and the ice pack. When to clean your hands: For patients  In the hospital or in your home, you can come in contact with many harmful germs. To help prevent infection, wash your hands with soap and water often, especially:  Before and After touching or changing a dressing or bandage  After using the bathroom  Before and after eating  After coughing or sneezing  After using a tissue  After touching any object or surface that may be contaminated  After touching an animal during a pet therapy session (hospital)  After touching an animal, cleaning up after a pet, or preparing food for pets (home)    Please contact [address] [place] [address] or the Orthopedic office with any questions or concerns after your discharge. If you have any issues or concerns after hours please call the Orthopedic Office to reach the Surgeon on call first at  [phone]. If you need a pain medication refill please contact your surgeon's office.      [facility]:    Monday-Friday 8am- 5pm      [facility] Nurse Navigator: Monday-Wednesday 8am- 5pm, Thursday 8am-3pm  [phone]    After Hours Clinic Walk in [place] [address] [facility], [id number] [place] [address] [address]    Monday-Friday 5pm-9pm  &  Saturday 9am-1pm          [phone]

## 2023-08-08 ENCOUNTER — ANESTHESIA EVENT (OUTPATIENT)
Dept: OPERATING ROOM | Age: 58
End: 2023-08-08
Payer: COMMERCIAL

## 2023-08-08 ENCOUNTER — TELEPHONE (OUTPATIENT)
Dept: ORTHOPEDIC SURGERY | Age: 58
End: 2023-08-08

## 2023-08-08 NOTE — TELEPHONE ENCOUNTER
General Question     Subject: QUESTION(S) BEFORE SX  Patient and /or Facility Request: [facility]  Contact Number: [phone]    PT IS SCHEDULED FOR SX TOMORROW 8/9 AND HAS QUESTIONS REGARDING SOME FORMS. SHE WOULD LIKE TO SPEAK WITH .      PLEASE ADVISE

## 2023-08-09 ENCOUNTER — ANESTHESIA (OUTPATIENT)
Dept: OPERATING ROOM | Age: 58
End: 2023-08-09
Payer: COMMERCIAL

## 2023-08-09 ENCOUNTER — APPOINTMENT (OUTPATIENT)
Dept: GENERAL RADIOLOGY | Age: 58
End: 2023-08-09
Attending: ORTHOPAEDIC SURGERY
Payer: COMMERCIAL

## 2023-08-09 ENCOUNTER — HOSPITAL ENCOUNTER (OUTPATIENT)
Age: 58
Setting detail: SURGERY ADMIT
Discharge: HOME | End: 2023-08-09
Attending: ORTHOPAEDIC SURGERY | Admitting: ORTHOPAEDIC SURGERY
Payer: COMMERCIAL

## 2023-08-09 VITALS
TEMPERATURE: 97.9 F | BODY MASS INDEX: 19.67 KG/M2 | HEIGHT: 63 IN | HEART RATE: 75 BPM | SYSTOLIC BLOOD PRESSURE: 157 MMHG | DIASTOLIC BLOOD PRESSURE: 79 MMHG | RESPIRATION RATE: 12 BRPM | OXYGEN SATURATION: 100 % | WEIGHT: 111 LBS

## 2023-08-09 DIAGNOSIS — M17.12 PRIMARY OSTEOARTHRITIS OF LEFT KNEE: Primary | ICD-10-CM

## 2023-08-09 LAB
ABO + RH BLD: NORMAL
BLD GP AB SCN SERPL QL: NORMAL
GLUCOSE BLD-MCNC: 117 MG/DL (ref 70–99)
PERFORMED ON: ABNORMAL

## 2023-08-09 PROCEDURE — 2580000003 HC RX 258: Performed by: ORTHOPAEDIC SURGERY

## 2023-08-09 PROCEDURE — 7100000000 HC PACU RECOVERY - FIRST 15 MIN: Performed by: ORTHOPAEDIC SURGERY

## 2023-08-09 PROCEDURE — 7100000011 HC PHASE II RECOVERY - ADDTL 15 MIN: Performed by: ORTHOPAEDIC SURGERY

## 2023-08-09 PROCEDURE — 86901 BLOOD TYPING SEROLOGIC RH(D): CPT

## 2023-08-09 PROCEDURE — 6360000002 HC RX W HCPCS: Performed by: ANESTHESIOLOGY

## 2023-08-09 PROCEDURE — 7100000010 HC PHASE II RECOVERY - FIRST 15 MIN: Performed by: ORTHOPAEDIC SURGERY

## 2023-08-09 PROCEDURE — C9399 UNCLASSIFIED DRUGS OR BIOLOG: HCPCS

## 2023-08-09 PROCEDURE — 97116 GAIT TRAINING THERAPY: CPT

## 2023-08-09 PROCEDURE — 64447 NJX AA&/STRD FEMORAL NRV IMG: CPT | Performed by: ANESTHESIOLOGY

## 2023-08-09 PROCEDURE — 2580000003 HC RX 258: Performed by: NURSE PRACTITIONER

## 2023-08-09 PROCEDURE — 97161 PT EVAL LOW COMPLEX 20 MIN: CPT

## 2023-08-09 PROCEDURE — C9290 INJ, BUPIVACAINE LIPOSOME: HCPCS | Performed by: ORTHOPAEDIC SURGERY

## 2023-08-09 PROCEDURE — 2709999900 HC NON-CHARGEABLE SUPPLY: Performed by: ORTHOPAEDIC SURGERY

## 2023-08-09 PROCEDURE — 6370000000 HC RX 637 (ALT 250 FOR IP): Performed by: ORTHOPAEDIC SURGERY

## 2023-08-09 PROCEDURE — 7100000001 HC PACU RECOVERY - ADDTL 15 MIN: Performed by: ORTHOPAEDIC SURGERY

## 2023-08-09 PROCEDURE — 3600000015 HC SURGERY LEVEL 5 ADDTL 15MIN: Performed by: ORTHOPAEDIC SURGERY

## 2023-08-09 PROCEDURE — 97530 THERAPEUTIC ACTIVITIES: CPT

## 2023-08-09 PROCEDURE — 6360000002 HC RX W HCPCS: Performed by: ORTHOPAEDIC SURGERY

## 2023-08-09 PROCEDURE — 3700000001 HC ADD 15 MINUTES (ANESTHESIA): Performed by: ORTHOPAEDIC SURGERY

## 2023-08-09 PROCEDURE — A4217 STERILE WATER/SALINE, 500 ML: HCPCS | Performed by: ORTHOPAEDIC SURGERY

## 2023-08-09 PROCEDURE — 2500000003 HC RX 250 WO HCPCS

## 2023-08-09 PROCEDURE — 6360000002 HC RX W HCPCS

## 2023-08-09 PROCEDURE — 73560 X-RAY EXAM OF KNEE 1 OR 2: CPT

## 2023-08-09 PROCEDURE — 2580000003 HC RX 258

## 2023-08-09 PROCEDURE — 6360000002 HC RX W HCPCS: Performed by: NURSE PRACTITIONER

## 2023-08-09 PROCEDURE — 3700000000 HC ANESTHESIA ATTENDED CARE: Performed by: ORTHOPAEDIC SURGERY

## 2023-08-09 PROCEDURE — 2580000003 HC RX 258: Performed by: ANESTHESIOLOGY

## 2023-08-09 PROCEDURE — 3600000005 HC SURGERY LEVEL 5 BASE: Performed by: ORTHOPAEDIC SURGERY

## 2023-08-09 PROCEDURE — 86900 BLOOD TYPING SEROLOGIC ABO: CPT

## 2023-08-09 PROCEDURE — C1776 JOINT DEVICE (IMPLANTABLE): HCPCS | Performed by: ORTHOPAEDIC SURGERY

## 2023-08-09 PROCEDURE — 27446 REVISION OF KNEE JOINT: CPT | Performed by: ORTHOPAEDIC SURGERY

## 2023-08-09 PROCEDURE — 2720000010 HC SURG SUPPLY STERILE: Performed by: ORTHOPAEDIC SURGERY

## 2023-08-09 PROCEDURE — 86850 RBC ANTIBODY SCREEN: CPT

## 2023-08-09 PROCEDURE — 6370000000 HC RX 637 (ALT 250 FOR IP): Performed by: NURSE PRACTITIONER

## 2023-08-09 DEVICE — BASEPLATE TIB SZ 3 L MEDIAL/RIGHT LAT UNI KNEE TI ONLAY FOR: Type: IMPLANTABLE DEVICE | Site: KNEE | Status: FUNCTIONAL

## 2023-08-09 DEVICE — CEMENT BNE RADIOPAQUE FAST SET ACRYL RESIN HI VISC SIMPLEXHV: Type: IMPLANTABLE DEVICE | Site: KNEE | Status: FUNCTIONAL

## 2023-08-09 DEVICE — COMPONENT FEM SZ 3 L MED R LAT CNDYL KNEE RESTORIS MCK: Type: IMPLANTABLE DEVICE | Site: KNEE | Status: FUNCTIONAL

## 2023-08-09 DEVICE — IMPLANTABLE DEVICE: Type: IMPLANTABLE DEVICE | Site: KNEE | Status: FUNCTIONAL

## 2023-08-09 RX ORDER — OXYCODONE HYDROCHLORIDE 10 MG/1
10 TABLET ORAL PRN
Status: DISCONTINUED | OUTPATIENT
Start: 2023-08-09 | End: 2023-08-09 | Stop reason: HOSPADM

## 2023-08-09 RX ORDER — SODIUM CHLORIDE 9 MG/ML
INJECTION, SOLUTION INTRAVENOUS PRN
Status: DISCONTINUED | OUTPATIENT
Start: 2023-08-09 | End: 2023-08-09 | Stop reason: HOSPADM

## 2023-08-09 RX ORDER — BUPIVACAINE HYDROCHLORIDE 2.5 MG/ML
INJECTION, SOLUTION EPIDURAL; INFILTRATION; INTRACAUDAL
Status: COMPLETED | OUTPATIENT
Start: 2023-08-09 | End: 2023-08-09

## 2023-08-09 RX ORDER — ONDANSETRON 4 MG/1
4 TABLET, FILM COATED ORAL EVERY 8 HOURS PRN
Qty: 15 TABLET | Refills: 0 | Status: SHIPPED | OUTPATIENT
Start: 2023-08-09 | End: 2023-08-14

## 2023-08-09 RX ORDER — ACETAMINOPHEN 500 MG
1000 TABLET ORAL ONCE
Status: COMPLETED | OUTPATIENT
Start: 2023-08-09 | End: 2023-08-09

## 2023-08-09 RX ORDER — SODIUM CHLORIDE 0.9 % (FLUSH) 0.9 %
5-40 SYRINGE (ML) INJECTION PRN
Status: DISCONTINUED | OUTPATIENT
Start: 2023-08-09 | End: 2023-08-09 | Stop reason: HOSPADM

## 2023-08-09 RX ORDER — MELOXICAM 7.5 MG/1
7.5 TABLET ORAL ONCE
Status: COMPLETED | OUTPATIENT
Start: 2023-08-09 | End: 2023-08-09

## 2023-08-09 RX ORDER — TRANEXAMIC ACID 650 MG/1
1950 TABLET ORAL ONCE
Status: COMPLETED | OUTPATIENT
Start: 2023-08-09 | End: 2023-08-09

## 2023-08-09 RX ORDER — OXYCODONE HYDROCHLORIDE 10 MG/1
10 TABLET ORAL ONCE
Status: COMPLETED | OUTPATIENT
Start: 2023-08-09 | End: 2023-08-09

## 2023-08-09 RX ORDER — MAGNESIUM HYDROXIDE 1200 MG/15ML
LIQUID ORAL CONTINUOUS PRN
Status: DISCONTINUED | OUTPATIENT
Start: 2023-08-09 | End: 2023-08-09 | Stop reason: HOSPADM

## 2023-08-09 RX ORDER — GLYCOPYRROLATE 0.2 MG/ML
INJECTION INTRAMUSCULAR; INTRAVENOUS PRN
Status: DISCONTINUED | OUTPATIENT
Start: 2023-08-09 | End: 2023-08-09 | Stop reason: SDUPTHER

## 2023-08-09 RX ORDER — ROCURONIUM BROMIDE 10 MG/ML
INJECTION, SOLUTION INTRAVENOUS PRN
Status: DISCONTINUED | OUTPATIENT
Start: 2023-08-09 | End: 2023-08-09 | Stop reason: SDUPTHER

## 2023-08-09 RX ORDER — LIDOCAINE HYDROCHLORIDE 20 MG/ML
INJECTION, SOLUTION EPIDURAL; INFILTRATION; INTRACAUDAL; PERINEURAL PRN
Status: DISCONTINUED | OUTPATIENT
Start: 2023-08-09 | End: 2023-08-09 | Stop reason: SDUPTHER

## 2023-08-09 RX ORDER — FENTANYL CITRATE 50 UG/ML
INJECTION, SOLUTION INTRAMUSCULAR; INTRAVENOUS PRN
Status: DISCONTINUED | OUTPATIENT
Start: 2023-08-09 | End: 2023-08-09 | Stop reason: SDUPTHER

## 2023-08-09 RX ORDER — SODIUM CHLORIDE 9 MG/ML
INJECTION, SOLUTION INTRAVENOUS CONTINUOUS PRN
Status: DISCONTINUED | OUTPATIENT
Start: 2023-08-09 | End: 2023-08-09 | Stop reason: SDUPTHER

## 2023-08-09 RX ORDER — SODIUM CHLORIDE 0.9 % (FLUSH) 0.9 %
5-40 SYRINGE (ML) INJECTION EVERY 12 HOURS SCHEDULED
Status: DISCONTINUED | OUTPATIENT
Start: 2023-08-09 | End: 2023-08-09 | Stop reason: HOSPADM

## 2023-08-09 RX ORDER — MIDAZOLAM HYDROCHLORIDE 1 MG/ML
INJECTION INTRAMUSCULAR; INTRAVENOUS PRN
Status: DISCONTINUED | OUTPATIENT
Start: 2023-08-09 | End: 2023-08-09 | Stop reason: SDUPTHER

## 2023-08-09 RX ORDER — PROPOFOL 10 MG/ML
INJECTION, EMULSION INTRAVENOUS PRN
Status: DISCONTINUED | OUTPATIENT
Start: 2023-08-09 | End: 2023-08-09 | Stop reason: SDUPTHER

## 2023-08-09 RX ORDER — CEFAZOLIN SODIUM 1 G/3ML
2000 INJECTION, POWDER, FOR SOLUTION INTRAMUSCULAR; INTRAVENOUS ONCE
Status: DISCONTINUED | OUTPATIENT
Start: 2023-08-09 | End: 2023-08-09

## 2023-08-09 RX ORDER — ONDANSETRON 2 MG/ML
INJECTION INTRAMUSCULAR; INTRAVENOUS PRN
Status: DISCONTINUED | OUTPATIENT
Start: 2023-08-09 | End: 2023-08-09 | Stop reason: SDUPTHER

## 2023-08-09 RX ORDER — DEXAMETHASONE SODIUM PHOSPHATE 4 MG/ML
INJECTION, SOLUTION INTRA-ARTICULAR; INTRALESIONAL; INTRAMUSCULAR; INTRAVENOUS; SOFT TISSUE PRN
Status: DISCONTINUED | OUTPATIENT
Start: 2023-08-09 | End: 2023-08-09 | Stop reason: SDUPTHER

## 2023-08-09 RX ORDER — CEPHALEXIN 500 MG/1
500 CAPSULE ORAL SEE ADMIN INSTRUCTIONS
Qty: 2 CAPSULE | Refills: 0 | Status: SHIPPED | OUTPATIENT
Start: 2023-08-09

## 2023-08-09 RX ORDER — OXYCODONE HYDROCHLORIDE 5 MG/1
5 TABLET ORAL PRN
Status: DISCONTINUED | OUTPATIENT
Start: 2023-08-09 | End: 2023-08-09 | Stop reason: HOSPADM

## 2023-08-09 RX ORDER — ONDANSETRON 2 MG/ML
4 INJECTION INTRAMUSCULAR; INTRAVENOUS
Status: COMPLETED | OUTPATIENT
Start: 2023-08-09 | End: 2023-08-09

## 2023-08-09 RX ORDER — PHENYLEPHRINE HCL IN 0.9% NACL 1 MG/10 ML
SYRINGE (ML) INTRAVENOUS PRN
Status: DISCONTINUED | OUTPATIENT
Start: 2023-08-09 | End: 2023-08-09 | Stop reason: SDUPTHER

## 2023-08-09 RX ORDER — ASPIRIN 81 MG/1
81 TABLET ORAL
Qty: 28 TABLET | Refills: 0 | Status: SHIPPED | OUTPATIENT
Start: 2023-08-09 | End: 2023-08-23

## 2023-08-09 RX ORDER — OXYCODONE HYDROCHLORIDE 5 MG/1
5-10 TABLET ORAL EVERY 6 HOURS PRN
Qty: 40 TABLET | Refills: 0 | Status: SHIPPED | OUTPATIENT
Start: 2023-08-09 | End: 2023-08-14 | Stop reason: SDUPTHER

## 2023-08-09 RX ADMIN — SODIUM CHLORIDE: 9 INJECTION, SOLUTION INTRAVENOUS at 08:09

## 2023-08-09 RX ADMIN — ROCURONIUM BROMIDE 20 MG: 50 INJECTION INTRAVENOUS at 10:20

## 2023-08-09 RX ADMIN — MELOXICAM 7.5 MG: 7.5 TABLET ORAL at 08:10

## 2023-08-09 RX ADMIN — PROPOFOL 20 MG: 10 INJECTION, EMULSION INTRAVENOUS at 09:49

## 2023-08-09 RX ADMIN — SUGAMMADEX 50 MG: 100 INJECTION, SOLUTION INTRAVENOUS at 10:55

## 2023-08-09 RX ADMIN — BUPIVACAINE HYDROCHLORIDE 25 ML: 2.5 INJECTION, SOLUTION EPIDURAL; INFILTRATION; INTRACAUDAL at 09:34

## 2023-08-09 RX ADMIN — Medication 100 MCG: at 10:00

## 2023-08-09 RX ADMIN — PROPOFOL 100 MG: 10 INJECTION, EMULSION INTRAVENOUS at 09:48

## 2023-08-09 RX ADMIN — FENTANYL CITRATE 50 MCG: 50 INJECTION INTRAMUSCULAR; INTRAVENOUS at 09:48

## 2023-08-09 RX ADMIN — HYDROMORPHONE HYDROCHLORIDE 0.5 MG: 1 INJECTION, SOLUTION INTRAMUSCULAR; INTRAVENOUS; SUBCUTANEOUS at 11:29

## 2023-08-09 RX ADMIN — FENTANYL CITRATE 50 MCG: 50 INJECTION INTRAMUSCULAR; INTRAVENOUS at 10:04

## 2023-08-09 RX ADMIN — SUGAMMADEX 50 MG: 100 INJECTION, SOLUTION INTRAVENOUS at 10:54

## 2023-08-09 RX ADMIN — SODIUM CHLORIDE: 9 INJECTION, SOLUTION INTRAVENOUS at 09:40

## 2023-08-09 RX ADMIN — HYDROMORPHONE HYDROCHLORIDE 0.5 MG: 1 INJECTION, SOLUTION INTRAMUSCULAR; INTRAVENOUS; SUBCUTANEOUS at 11:21

## 2023-08-09 RX ADMIN — GLYCOPYRROLATE 0.2 MG: 0.2 INJECTION INTRAMUSCULAR; INTRAVENOUS at 09:57

## 2023-08-09 RX ADMIN — SUGAMMADEX 50 MG: 100 INJECTION, SOLUTION INTRAVENOUS at 11:00

## 2023-08-09 RX ADMIN — ONDANSETRON 4 MG: 2 INJECTION INTRAMUSCULAR; INTRAVENOUS at 10:08

## 2023-08-09 RX ADMIN — MIDAZOLAM 2 MG: 1 INJECTION INTRAMUSCULAR; INTRAVENOUS at 09:34

## 2023-08-09 RX ADMIN — CEFAZOLIN 1000 MG: 1 INJECTION, POWDER, FOR SOLUTION INTRAMUSCULAR; INTRAVENOUS at 15:04

## 2023-08-09 RX ADMIN — CEFAZOLIN 2000 MG: 2 INJECTION, POWDER, FOR SOLUTION INTRAMUSCULAR; INTRAVENOUS at 09:51

## 2023-08-09 RX ADMIN — ROCURONIUM BROMIDE 5 MG: 50 INJECTION INTRAVENOUS at 09:48

## 2023-08-09 RX ADMIN — ACETAMINOPHEN 1000 MG: 500 TABLET ORAL at 15:05

## 2023-08-09 RX ADMIN — SUGAMMADEX 50 MG: 100 INJECTION, SOLUTION INTRAVENOUS at 10:56

## 2023-08-09 RX ADMIN — OXYCODONE HYDROCHLORIDE 10 MG: 10 TABLET ORAL at 08:10

## 2023-08-09 RX ADMIN — ROCURONIUM BROMIDE 25 MG: 50 INJECTION INTRAVENOUS at 09:52

## 2023-08-09 RX ADMIN — HYDROMORPHONE HYDROCHLORIDE 0.5 MG: 1 INJECTION, SOLUTION INTRAMUSCULAR; INTRAVENOUS; SUBCUTANEOUS at 11:34

## 2023-08-09 RX ADMIN — TRANEXAMIC ACID 1950 MG: 650 TABLET ORAL at 08:10

## 2023-08-09 RX ADMIN — ONDANSETRON 4 MG: 2 INJECTION INTRAMUSCULAR; INTRAVENOUS at 13:33

## 2023-08-09 RX ADMIN — LIDOCAINE HYDROCHLORIDE 50 MG: 20 INJECTION, SOLUTION EPIDURAL; INFILTRATION; INTRACAUDAL; PERINEURAL at 09:48

## 2023-08-09 RX ADMIN — DEXAMETHASONE SODIUM PHOSPHATE 8 MG: 4 INJECTION, SOLUTION INTRAMUSCULAR; INTRAVENOUS at 09:52

## 2023-08-09 ASSESSMENT — PAIN DESCRIPTION - DESCRIPTORS: DESCRIPTORS: THROBBING

## 2023-08-09 ASSESSMENT — PAIN - FUNCTIONAL ASSESSMENT
PAIN_FUNCTIONAL_ASSESSMENT: 0-10
PAIN_FUNCTIONAL_ASSESSMENT: PREVENTS OR INTERFERES SOME ACTIVE ACTIVITIES AND ADLS

## 2023-08-09 ASSESSMENT — PAIN DESCRIPTION - ORIENTATION: ORIENTATION: LEFT

## 2023-08-09 ASSESSMENT — PAIN DESCRIPTION - FREQUENCY: FREQUENCY: CONTINUOUS

## 2023-08-09 ASSESSMENT — PAIN SCALES - GENERAL
PAINLEVEL_OUTOF10: 10
PAINLEVEL_OUTOF10: 2

## 2023-08-09 ASSESSMENT — PAIN DESCRIPTION - LOCATION: LOCATION: KNEE

## 2023-08-09 ASSESSMENT — PAIN DESCRIPTION - ONSET: ONSET: AWAKENED FROM SLEEP

## 2023-08-09 ASSESSMENT — ENCOUNTER SYMPTOMS: SHORTNESS OF BREATH: 0

## 2023-08-09 ASSESSMENT — PAIN DESCRIPTION - PAIN TYPE: TYPE: SURGICAL PAIN

## 2023-08-09 NOTE — PROGRESS NOTES
Pt received into room via bed into room 18. Report from [facility]. Pt drowsy, yet oriented. Room air. Denies pain. Able to wiggle toes. Feeling to hips/buttocks. Pedal pulse strong. Nydia at bedside.  called to room. Declined po intake at this time.

## 2023-08-09 NOTE — PROGRESS NOTES
Huddle performed this morning including Nurse navigator Chuckie Morales, Physical therapist Jacob Grimm, and Occupational therapist Luis Phelan. Discussed plan of care, discharge plan, and dme needs if applicable for orthopedic total joint patient. Notified patti  and Rosalind Valentino NP of same day discharge.      Electronically signed by Jr Ramos RN on 8/9/2023 at 12:09 PM

## 2023-08-09 NOTE — H&P
Update History & Physical    The patient's History and Physical of July 21, 2023 was reviewed with the patient and I examined the patient. There was no change. The surgical site was confirmed by the patient and me. Plan: The risks, benefits, expected outcome, and alternative to the recommended procedure have been discussed with the patient. Patient understands and wants to proceed with the procedure.      Electronically signed by Wagner Garza MD on 8/9/2023 at 8:12 AM

## 2023-08-09 NOTE — CARE COORDINATION
Critical access hospital    DC order noted, all docs needed have been faxed to [facility] for home care services.     Home care to see patient within 24-48 hrs    Sarah Casey RN, BSN CTN  Critical access hospital [phone]

## 2023-08-09 NOTE — PROGRESS NOTES
Per Kala Tate NP patient is to start home care prior to outpatient therapy. I notified [facility] home care liaison.

## 2023-08-09 NOTE — PROGRESS NOTES
Upon assessment, patient resting in bed eyes closed with wet rag on forehead after ambulating with therapy. patient color pale with greenish tint. Bedside RN reports patient has not had anything more to eat than a few bites of cracker and had an episode of emesis after ambulating with therapist. Zachary Lin physical therapist stated patient was able to ambulate safely with x1 wheeled walker. I notified Gemini Moreland NP and stated she would like to admit patient overnight for monitoring.

## 2023-08-09 NOTE — ANESTHESIA PROCEDURE NOTES
Peripheral Block    Patient location during procedure: PACU  Reason for block: post-op pain management and at surgeon's request  Start time: 8/9/2023 9:34 AM  End time: 8/9/2023 9:34 AM  Staffing  Performed: anesthesiologist   Anesthesiologist: Ester Calixto MD  Preanesthetic Checklist  Completed: patient identified, IV checked, site marked, risks and benefits discussed, surgical/procedural consents, equipment checked, pre-op evaluation, timeout performed, anesthesia consent given, oxygen available and monitors applied/VS acknowledged  Peripheral Block   Patient position: supine  Prep: ChloraPrep  Provider prep: mask and sterile gloves  Patient monitoring: cardiac monitor, continuous pulse ox, frequent blood pressure checks and IV access  Block type: Femoral  Adductor canal  Laterality: left  Injection technique: single-shot  Guidance: ultrasound guided  Local infiltration: lidocaine  Infiltration strength: 1 %  Local infiltration: lidocaine    Needle   Needle type: combined needle/nerve stimulator   Needle gauge: 22 G  Needle localization: ultrasound guidance  Needle length: 5 cm  Assessment   Injection assessment: negative aspiration for heme, no paresthesia on injection and local visualized surrounding nerve on ultrasound  Slow fractionated injection: yes  Hemodynamics: stable  Real-time US image taken/store: yes  Outcomes: uncomplicated and patient tolerated procedure well    Additional Notes  Sartorius and Vastus Medialis Muscle, Femoral artery and Saphenous nerve are identified; the tip of the needle and the spread of the local anesthetic around the Saphenous nerve are visualized. The Saphenous nerve appeared to be anatomically normal and there were no abnormal pathologically findings seen.    Medications Administered  bupivacaine (MARCAINE) PF injection 0.25% - Perineural   25 mL - 8/9/2023 9:34:00 AM

## 2023-08-09 NOTE — PLAN OF CARE
Problem: Neurosensory - Adult  Goal: Achieves stable or improved neurological status  Flowsheets (Taken 8/9/2023 1821)  Achieves stable or improved neurological status: Assess for and report changes in neurological status     Problem: Cardiovascular - Adult  Goal: Maintains optimal cardiac output and hemodynamic stability  Flowsheets (Taken 8/9/2023 1821)  Maintains optimal cardiac output and hemodynamic stability: Monitor blood pressure and heart rate     Problem: Skin/Tissue Integrity - Adult  Goal: Skin integrity remains intact  Flowsheets (Taken 8/9/2023 1821)  Skin Integrity Remains Intact: Monitor for areas of redness and/or skin breakdown  Goal: Incisions, wounds, or drain sites healing without S/S of infection  Flowsheets (Taken 8/9/2023 1821)  Incisions, Wounds, or Drain Sites Healing Without Sign and Symptoms of Infection: TWICE DAILY: Assess and document dressing/incision, wound bed, drain sites and surrounding tissue     Problem: Skin/Tissue Integrity - Adult  Goal: Incisions, wounds, or drain sites healing without S/S of infection  Flowsheets (Taken 8/9/2023 1821)  Incisions, Wounds, or Drain Sites Healing Without Sign and Symptoms of Infection: TWICE DAILY: Assess and document dressing/incision, wound bed, drain sites and surrounding tissue     Problem: Musculoskeletal - Adult  Goal: Return mobility to safest level of function  Flowsheets (Taken 8/9/2023 1821)  Return Mobility to Safest Level of Function:   Assess patient stability and activity tolerance for standing, transferring and ambulating with or without assistive devices   Assist with transfers and ambulation using safe patient handling equipment as needed   Ensure adequate protection for wounds/incisions during mobilization   Obtain physical therapy/occupational therapy consults as needed   Instruct patient/family in ordered activity level     Problem: Infection - Adult  Goal: Absence of infection at discharge  Flowsheets (Taken 8/9/2023

## 2023-08-09 NOTE — PLAN OF CARE
Problem: Infection - Adult  Goal: Absence of infection at discharge  8/9/2023 1823 by Blanche Cruz RN  Outcome: Completed  8/9/2023 1821 by Blanche Cruz RN  Flowsheets (Taken 8/9/2023 1821)  Absence of infection at discharge:   Assess and monitor for signs and symptoms of infection   Monitor lab/diagnostic results   Instruct and encourage patient and family to use good hand hygiene technique  Goal: Absence of infection during hospitalization  Outcome: Completed     Problem: Metabolic/Fluid and Electrolytes - Adult  Goal: Glucose maintained within prescribed range  8/9/2023 1823 by Blanche Cruz RN  Outcome: Completed  8/9/2023 1821 by Blanche Cruz RN  Flowsheets (Taken 8/9/2023 1821)  Glucose maintained within prescribed range: Monitor blood glucose as ordered     Problem: Gastrointestinal - Adult  Goal: Minimal or absence of nausea and vomiting  8/9/2023 1823 by Blanche Cruz RN  Outcome: Completed  8/9/2023 1821 by Blanche Cruz RN  Flowsheets (Taken 8/9/2023 1821)  Minimal or absence of nausea and vomiting: Advance diet as tolerated, if ordered  Note: Patient had 1 episode of emesis. Patient able to tolerate a few bites of soup and deli wrap.      Problem: Safety - Adult  Goal: Free from fall injury  8/9/2023 1823 by Blanche Cruz RN  Outcome: Completed  8/9/2023 1821 by Blanche Cruz RN  Flowsheets (Taken 8/9/2023 1821)  Free From Fall Injury: Instruct family/caregiver on patient safety     Problem: Pain  Goal: Verbalizes/displays adequate comfort level or baseline comfort level  8/9/2023 1823 by Blanche Cruz RN  Outcome: Completed  8/9/2023 1821 by Blanche Cruz RN  Flowsheets (Taken 8/9/2023 1821)  Verbalizes/displays adequate comfort level or baseline comfort level: Assess pain using appropriate pain scale

## 2023-08-09 NOTE — OP NOTE
Patient: Alma Perez  YOB: 1965  MRN: [id number]    DATE OF PROCEDURE: 8/9/2023       PREOPERATIVE DIAGNOSIS:  Medial compartment degenerative osteoarthritis of the left knee. POSTOPERATIVE DIAGNOSIS:  Medial compartment degenerative osteoarthritis of the left knee. OPERATION PERFORMED:  Robotic-assisted left knee medial compartment arthroplasty. SURGEON:  Sesar Lauren MD     EBL: 100 mL     IMPLANTS:  Dacoma Tahir Restoris femur size 3  Dacoma Tahir Restoris tibia size 3  9 mm polyethylene     INDICATIONS:  The patient is a 62 y.o. female who presents for left knee replacement. She had been treated conservatively with anti-inflammatories, physical therapy and intra-articular cortisone injections; none of these gave any significant relief. We discussed medial compartment vs total knee arthroplasty. The operative procedure, alternatives, and risks were discussed in detail with the patient. The risks include but are not limited to: Infection, vessel injury, nerve injury, DVT, pulmonary embolism, implant loosening, need for revision surgery, loss of motion, continued pain. Informed consent for surgery was signed by the patient. OPERATIVE PROCEDURE:  The patient was seen in the preoperative holding area where the site of surgery was marked and informed consent was confirmed. The patient was brought back to the operating room by OR personnel. General anesthesia was administered. The patient was positioned supine on the operating table. The left lower extremity was then prepped and draped in a standard and sterile fashion. A final and formal timeout was then performed which confirmed the correct patient, correct position, and correct site of surgery. IV antibiotics were administered within 1 hour of the skin incision. A longitudinal incision was made over the knee just medial to midline. Skin and subcutaneous tissue were divided down to the extensor mechanism.  A subvastus

## 2023-08-09 NOTE — PROGRESS NOTES
Pt states she has urinated, ate some food and does not feel lightheaded. She is adamant she will be able to go home safely today with home support. I advised her to return to ER for inability to void, unable to tolerate food with severe nausea or any syncopal episode. She agreed.   and son to be with pt for next 3 days she says

## 2023-08-09 NOTE — PROGRESS NOTES
Physical Therapy  Facility/Department: [facility] OR  Physical Therapy Initial Assessment  This note serves as patient discharge summary if pt discharges prior to next PT visit        Name: Lieutenant Panda  : 1965  MRN: [id number]  Date of Service: 2023    Discharge Recommendations:  Patient would benefit from continued therapy after discharge, 24 hour supervision or assist (2-3)   Lieutenant Panda scored a 18/24 on the AM-PAC short mobility form. Current research shows that an AM-PAC score of 18 or greater is typically associated with a discharge to the patient's home setting. Based on the patient's AM-PAC score and their current functional mobility deficits, it is recommended that the patient have 2-3 sessions per week of HOME Physical Therapy at d/c to increase the patient's independence. PT Equipment Recommendations  Equipment Needed: No  Other: Reports having wh walker for home. Patient Diagnosis(es): The encounter diagnosis was Primary osteoarthritis of left knee. Past Medical History:  has a past medical history of ADD (attention deficit disorder), Allergic rhinitis, Anisocoria, Anxiety and depression, Deviated septum, Elevated homocysteine, Essential hypertension, Irritable bowel syndrome, Prolonged emergence from general anesthesia, and Wears glasses. Past Surgical History:  has a past surgical history that includes sinus surgery; Tubal ligation; Bunionectomy; Shoulder arthroscopy (Right); Dilation and curettage of uterus; Endometrial ablation; [place] tooth extraction; Inguinal hernia repair (2011); Total hip arthroplasty (Right, 2013); pr arthrs kne surg w/meniscectomy med/lat w/shvg (Left, 2018); and Knee arthroscopy (Bilateral).     Assessment   Body Structures, Functions, Activity Limitations Requiring Skilled Therapeutic Intervention: Decreased ROM  Assessment: 63 yo female with pre op dx of Medial compartment degenerative osteoarthritis of the left knee, to

## 2023-08-09 NOTE — PROGRESS NOTES
Data- Discharge order received, patient verbalized agreement to discharge, disposition to previous residence, needs noted for [address] and informed Minus Plane NP. Action- discharge instructions prepared and given to patient and  Erica Speak, patient and Erica Speak verbalized understanding. Medication information packet given related to NEW and/or CHANGED prescriptions emphasizing name/purpose/side effects, pt verbalized understanding. Discharge instruction summary: Diet- General, Activity- Full weight bearing, Primary Care Physician as follows: Reji Anton [phone]. Follow up appointment with orthopedic office noted below, immunizations reviewed and discussed with patient, prescription medications to be filled by retail pharmacy and then delivered. Inpatient surgical procedure precautions reviewed: Jared Meadows Educated patient on using incentive spirometer post-discharge per surgeon's instructions. Neurovascular checks performed and moderate dorsi and plantar flexions noted bilaterally, toes appear appropriate to ethnicity in color, Cool to touch, patient denies numbness or tingling in extremities. Left Knee Incision site prineo glue dressing assessed and is clean,dry, and intact, no signs of redness, drainage, or odor noted. patient's bedside RN Milton Huerta notified of patient completing discharge instructions. Nurse Navigator and Orthopedic Office contact information on discharge instructions and provided to patient. I educated patient on wound care instructions  including to cover the wound with a sterile gauze dressing and change daily as long as there is drainage. Wound dressing supplies provided to patient. Educated on not to apply any lotions or creams to your wound. Educated to check the incision every day for redness, swelling, or increase in drainage and to notify Dr Dana Roldan office if any symptoms are noted.  Educated patient to refrain from alcohol while on aspirin and oxycodone, patient

## 2023-08-09 NOTE — DISCHARGE INSTR - COC
Continuity of Care Form    Patient Name: Bobby Traore   :  1965  MRN:  [id number]    Admit date:  2023  Discharge date:  2023    Code Status Order: No Order   Advance Directives:   [address] Rinkun[address]el Documentation       Date/Time Healthcare Directive Type of Healthcare Directive Copy in [address] Agent's Name Healthcare Agent's Phone Number    [phone] No, patient does not have an advance directive for healthcare treatment -- -- -- -- --            Admitting Physician:  Gerry Martines MD  PCP: Shanae Connell MD    Discharging Nurse: [facility] Unit/Room#: OR/NONE  Discharging Unit Phone Number: [phone]    Emergency Contact:   Extended Emergency Contact Information  Primary Emergency Contact: Michael Nascimento  Address: [address] [place] [address], [address] [facility] [place] [address] [address] Phone: [phone]  Relation: Spouse  Secondary Emergency Contact: [address] [place] [address] Phone: [phone]  Relation: Child  Preferred language: English   needed?  No    Past Surgical History:  Past Surgical History:   Procedure Laterality Date    BUNIONECTOMY      bilateral    DILATION AND CURETTAGE OF UTERUS      ENDOMETRIAL ABLATION      INGUINAL HERNIA REPAIR  2011    right inguinal hernia repair with mesh    KNEE ARTHROSCOPY Bilateral     [place] ARTHRS KNE SURG W/MENISCECTOMY MED/LAT W/SHVG Left 2018    LEFT KNEE ARTHROSCOPY, PARTIAL MEDIAL MENISCECTOMY AND ARTHROSCOPICALLY AIDED TREATMENT OF LEFT MEDIAL TIBIAL PLATEAU FRACTURE performed by Darnell Shea MD at [address] [place] [address] ARTHROSCOPY Right     right rotator cuff repair, ,left rotator cuff repair and bicep repair    SINUS SURGERY      twice    TOTAL HIP ARTHROPLASTY Right 2013    TUBAL LIGATION      WISDOM TOOTH EXTRACTION         Immunization History:   Immunization History   Administered Date(s) Administered    COVID-19, PFIZER Bivalent, Electronically signed by Marisol Lopez RN on 8/9/23 at 5:37 PM EDT    CASE MANAGEMENT/SOCIAL WORK SECTION    Inpatient Status Date: ***    Readmission Risk Assessment Score:  Readmission Risk              Risk of Unplanned Readmission:  0           Discharging to Facility/ Agency   Name:     [facility]  Address:  [address],  [address], [address] [address]  Phone:     [phone]  Fax:         [phone]      Dialysis Facility (if applicable)   Name:  Address:  Dialysis Schedule:  Phone:  Fax:    / signature: {Esignature:[id number]}    PHYSICIAN SECTION    Prognosis: {Prognosis:0730631963}    Condition at Discharge: [address] Patient Condition:355380531}    Rehab Potential (if transferring to Rehab): {Prognosis:8160581971}    Recommended Labs or Other Treatments After Discharge: ***    Physician Certification: I certify the above information and transfer of Anthony Remy  is necessary for the continuing treatment of the diagnosis listed and that she requires {Admit to Appropriate Level of Care:79996} for {GREATER/LESS:591241137} 30 days.      Update Admission H&P: {CHP DME Changes in AEUVK:091235277}    PHYSICIAN SIGNATURE:  {Esignature:[id number]}

## 2023-08-09 NOTE — PROGRESS NOTES
Patient updated that Shan Parker NP has concerns for discharge and would like to admit patient for monitoring. Patient became upset and states she feels she can go home today safely with her  and son. I educated patient on concerns with nausea and vomiting and complications that could arise. Patient stated she has urinated, and ambulated with therapist without difficulty. Patient has tolerated a few bites of chicken noodle soup and deli wrap. She denies lightheadedness and her blood pressure is 157/79 which was reported to Shan Parker NP via telephone. Patient reports she also has a neighbor and other family members that she can call if needed for support and is adamant that she will be safe to discharge tonight. Patient spoke with Shan Parker NP via telephone and Shan Parker NP informed the patient to return to emergency department for complications such as: inability to void, unable to tolerate food with severe nausea or any syncopal episode. Patient and  Doretha Hutchinson verbalized understanding. I educated patient on signs and symptoms of stroke, heart attack, blood clots and to call 911 if any symptoms occur. I also reinforced educated to go to the emergency department if patient has a syncopal episode, unable to consume food and/or fluids, or if she has continued emesis. Patient and  Doretha Hutchinson verbalized understanding.

## 2023-08-10 ENCOUNTER — TELEPHONE (OUTPATIENT)
Dept: ORTHOPEDIC SURGERY | Age: 58
End: 2023-08-10

## 2023-08-10 ENCOUNTER — TELEPHONE (OUTPATIENT)
Dept: ORTHOPEDICS UNIT | Age: 58
End: 2023-08-10

## 2023-08-10 RX ORDER — MELOXICAM 7.5 MG/1
7.5 TABLET ORAL DAILY PRN
Qty: 30 TABLET | Refills: 0 | Status: SHIPPED | OUTPATIENT
Start: 2023-08-10

## 2023-08-10 NOTE — TELEPHONE ENCOUNTER
General Question     Subject: POST OP ISSUE  Patient and /or Facility Request: Tam Busby  Contact Number: [phone]      I HAVE A RED FLAG ON Lacey Harrington, MRN [id number]. ELISSA FROM [employer] FOUND HER ON THE PHONE WRITHING IN PAIN AND SHIVERING. SHE HAD KNEE SX FROM DR Yehuda Huerta YESTERDAY, AND SHE WANTS TO SPEAK WITH A NURSE FROM  [facility] OFFICE.   ELISSA'S PHONE # [phone]

## 2023-08-10 NOTE — TELEPHONE ENCOUNTER
Spoke with patient regarding post discharge from hospital.    Incision status: No drainage, odor, or redness noted per patient    Edema/Swelling/Teds: edema noted, wearing teds    Pain level and status: \"excoriating\"     Use of pain medications: yes ; Patient stated they are taking their pain medication oxycodone as prescribed. Patient did start mobic today. Use of ice therapy: yes     Blood thinner: aspirin 81mg ; Verified with patient that they are taking their anticoagulant as prescribed twice a day. Bowels: no constipation    Home Care Agency active: yes ; Claims already saw physical therapist today . Outpatient therapy: n/a    Do you have all of your medications: mobic added    Changes in medications: mobic was added for uncontrolled pain. denies nausea/vomiting today. States she completed her keflex. No other questions/concerns at this time. Encouraged patient to call Orthopedic Nurse Navigator Franklin Leon or Orthopedic office if has any questions/concerns.       Follow up appointments:    Future Appointments   Date Time Provider [address] [place] [address]   8/24/2023  1:15 PM Luis Neal MD [facility]     Electronically signed by Manpreet Harris RN on 8/10/2023 at 1:35 PM

## 2023-08-10 NOTE — TELEPHONE ENCOUNTER
Other PATIENT'S  IS CALLING IN AND PAIN MEDICATION DOES NOT SEEM LIKE IT IS HELPING. WOULD LIKE TO BE PRESCRIBED SOMETHING ELSE.  [address] [address] [phone]

## 2023-08-14 DIAGNOSIS — M17.12 PRIMARY OSTEOARTHRITIS OF LEFT KNEE: ICD-10-CM

## 2023-08-14 RX ORDER — OXYCODONE HYDROCHLORIDE 5 MG/1
5-10 TABLET ORAL EVERY 4 HOURS PRN
Qty: 60 TABLET | Refills: 0 | Status: SHIPPED | OUTPATIENT
Start: 2023-08-14 | End: 2023-08-19

## 2023-08-14 NOTE — TELEPHONE ENCOUNTER
General Question     Subject: PAIN MED  Patient and /or Facility Request: Michael Gunter  Contact Number: [phone]    PT CLLED SAID SHE IS ALMOST OUT OF OXYCODONE SHE HAS BEEN TAKING 2 EVERY 4-6 HRS AND TYLENOL BETWEEN. SHE IS REQUESTING A REFILL OR SOMETHING DIFFERENT SHE HAS 1 1/2 DAYS LFT.    [facility] ON [address]    PLEAE CLL PT BACK AT N NUMBER ABOVE

## 2023-08-21 ENCOUNTER — TELEPHONE (OUTPATIENT)
Dept: ORTHOPEDIC SURGERY | Age: 58
End: 2023-08-21

## 2023-08-21 DIAGNOSIS — Z96.652 S/P LEFT UNICOMPARTMENTAL KNEE REPLACEMENT: Primary | ICD-10-CM

## 2023-08-21 NOTE — TELEPHONE ENCOUNTER
General Question     Subject: SHOULD Pt SET UP OUTPATIENT PT NOW OR WAIT UNT[facility]? Patient and /or Facility Request: Jessika Parker  Contact Number: [phone]    Pt COMPLETES IN HOME PT TODAY. SHOULD SHE SET UP OUTPATIENT PT NOW OR WAIT UNTIL PO FU?

## 2023-08-24 ENCOUNTER — OFFICE VISIT (OUTPATIENT)
Dept: ORTHOPEDIC SURGERY | Age: 58
End: 2023-08-24

## 2023-08-24 VITALS — BODY MASS INDEX: 19.49 KG/M2 | HEIGHT: 63 IN | WEIGHT: 110 LBS

## 2023-08-24 DIAGNOSIS — Z96.652 STATUS POST LEFT PARTIAL KNEE REPLACEMENT: Primary | ICD-10-CM

## 2023-08-24 PROCEDURE — 99024 POSTOP FOLLOW-UP VISIT: CPT | Performed by: ORTHOPAEDIC SURGERY

## 2023-08-24 NOTE — PROGRESS NOTES
[facility] [address] [facility]  Office Visit    Chief Complaint: Follow-up s/p left knee medial compartment arthroplasty    HPI:  Sami Villarreal is a 62 y.o. who is here in follow-up of left knee medial compartment arthroplasty performed on August 9, 2023. She is having minimal pain lexii occasionally as needed. She walks without assistive device. She has been active in her therapy. She is walking up to 4.5 miles per day. She continues to have swelling. Patient Active Problem List   Diagnosis    Allergic rhinitis    Anxiety state    Osteoarthritis of hip    Family history of coronary artery disease    Depressive disorder, not elsewhere classified    ADD (attention deficit disorder)    Recurrent major depressive disorder, in full remission ([facility])    Acute recurrent sinusitis    Acute medial meniscal tear, left, subsequent encounter    Closed fracture of medial plateau of left tibia, initial encounter    Essential hypertension       ROS:  Constitutional: denies fever, chills, weight loss  MSK: denies pain in other joints, muscle aches  Neurological: denies numbness, tingling, weakness    Exam:  Height 5' 3\" (1.6 m), weight 110 lb (49.9 kg)    Appearance: sitting in exam room chair, appears to be in no acute distress, awake and alert  Resp: unlabored breathing on room air  Skin: warm, dry and intact with out erythema or significant increased temperature  Neuro: grossly intact both lower extremities. Intact sensation to light touch. Motor exam 4+ to 5/5 in all major motor groups. Right knee: Incision is healing as expected. Range of motion is 0 to 100 degrees. Sensation is intact light touch. There is brisk capillary refill. There is 5/5 muscle strength in all muscle groups. Imaging:  3 views of the right knee were performed and reviewed today.  Significant for medial compartment arthroplasty prosthesis in place with no signs of osteolysis, loosening, fracture, or

## 2023-08-28 ENCOUNTER — HOSPITAL ENCOUNTER (OUTPATIENT)
Dept: PHYSICAL THERAPY | Age: 58
Setting detail: THERAPIES SERIES
Discharge: HOME | End: 2023-08-28
Payer: COMMERCIAL

## 2023-08-28 PROCEDURE — 97140 MANUAL THERAPY 1/> REGIONS: CPT | Performed by: PHYSICAL THERAPIST

## 2023-08-28 PROCEDURE — 97110 THERAPEUTIC EXERCISES: CPT | Performed by: PHYSICAL THERAPIST

## 2023-08-28 PROCEDURE — 97016 VASOPNEUMATIC DEVICE THERAPY: CPT | Performed by: PHYSICAL THERAPIST

## 2023-08-28 PROCEDURE — 97161 PT EVAL LOW COMPLEX 20 MIN: CPT | Performed by: PHYSICAL THERAPIST

## 2023-08-28 NOTE — FLOWSHEET NOTE
of function. [] Progressing: [] Met: [] Not Met: [] Adjusted  2. Patient will demonstrate increased AROM to 0-130 to allow for proper joint functioning as indicated by patients Functional Deficits. [] Progressing: [] Met: [] Not Met: [] Adjusted  3. Patient will demonstrate an increase in Strength to good proximal hip strength and control, within 5lb HHD in LE to allow for proper functional mobility as indicated by patients Functional Deficits. [] Progressing: [] Met: [] Not Met: [] Adjusted  4. Patient will return to all functional activities without increased symptoms or restriction. [] Progressing: [] Met: [] Not Met: [] Adjusted  5. Patient will be able to ambulate > 30 minutes with normal gait without AD/pain or dysfunction. [] Progressing: [] Met: [] Not Met: [] Adjusted  6. Patient will be able to manage a flight of stairs with normal gait without AD/pain or dysfunction. [] Progressing: [] Met: [] Not Met: [] Adjusted         Overall Progression Towards Functional goals/ Treatment Progress Update:  [] Patient is progressing as expected towards functional goals listed. [] Progression is slowed due to complexities/Impairments listed. [] Progression has been slowed due to co-morbidities.   [x] Plan just implemented, too soon to assess goals progression <30days   [] Goals require adjustment due to lack of progress  [] Patient is not progressing as expected and requires additional follow up with physician  [] Other    Prognosis for POC: [x] Good [] Fair  [] Poor      Patient requires continued skilled intervention: [x] Yes  [] No    Treatment/Activity Tolerance:  [x] Patient able to complete treatment  [] Patient limited by fatigue  [] Patient limited by pain     [] Patient limited by other medical complications  [] Other:         PLAN: See eval  [] Continue per plan of care [] Alter current plan (see comments above)  [x] Plan of care initiated [] Hold pending MD visit [] Discharge  2x/week for 6

## 2023-08-28 NOTE — PLAN OF CARE
activities   [x]Reduced participation in home management activities   [x]Reduced participation in work activities   [x]Reduced participation in social activities. [x]Reduced participation in sport activities. Classification :    [x]Signs/symptoms consistent with post-surgical status including decreased ROM, strength and function.    []Signs/symptoms consistent with joint sprain/strain   []Signs/symptoms consistent with patella-femoral syndrome   []Signs/symptoms consistent with knee OA/hip OA   []Signs/symptoms consistent with internal derangement of knee/Hip   []Signs/symptoms consistent with functional hip weakness/NMR control      []Signs/symptoms consistent with tendinitis/tendinosis    []signs/symptoms consistent with pathology which may benefit from Dry needling      []other:      Prognosis/Rehab Potential:      []Excellent   [x]Good    []Fair   []Poor    Tolerance of evaluation/treatment:    []Excellent   [x]Good    []Fair   []Poor    Physical Therapy Evaluation Complexity Justification  [x] A history of present problem with:  [] no personal factors and/or comorbidities that impact the plan of care;  []1-2 personal factors and/or comorbidities that impact the plan of care  [x]3 personal factors and/or comorbidities that impact the plan of care  [x] An examination of body systems using standardized tests and measures addressing any of the following: body structures and functions (impairments), activity limitations, and/or participation restrictions;:  [] a total of 1-2 or more elements   [x] a total of 3 or more elements   [] a total of 4 or more elements   [x] A clinical presentation with:  [x] stable and/or uncomplicated characteristics   [] evolving clinical presentation with changing characteristics  [] unstable and unpredictable characteristics;   [x] Clinical decision making of [x] low, [] moderate, [] high complexity using standardized patient assessment instrument and/or measurable assessment of

## 2023-09-01 ENCOUNTER — HOSPITAL ENCOUNTER (OUTPATIENT)
Dept: PHYSICAL THERAPY | Age: 58
Setting detail: THERAPIES SERIES
Discharge: HOME | End: 2023-09-01
Payer: COMMERCIAL

## 2023-09-01 PROCEDURE — 97110 THERAPEUTIC EXERCISES: CPT | Performed by: PHYSICAL THERAPIST

## 2023-09-01 PROCEDURE — 97016 VASOPNEUMATIC DEVICE THERAPY: CPT | Performed by: PHYSICAL THERAPIST

## 2023-09-01 PROCEDURE — 97140 MANUAL THERAPY 1/> REGIONS: CPT | Performed by: PHYSICAL THERAPIST

## 2023-09-01 NOTE — FLOWSHEET NOTE
[facility] and [address] [place] [address] [address],  [facility], [address] [place] [address]  [facility], [address] [place] [address] Avenue  Phone: [phone]  Fax: [phone]      Physical Therapy Daily Treatment Note  Date:  2023    Patient Name:  Rocio Connell    :  1965  MRN: [id number]  Restrictions/Precautions:    Medical/Treatment Diagnosis Information:  S/P left unicompartmental knee replacement [F02.499]  Treatment Diagnosis: M25.562, G89.29 (ICD-10-CM) - Chronic pain of left knee  Left medial compartment partial knee replacement 09  Insurance/Certification information:  PT Insurance Information: [facility]/$0 copay/mn/no auth  Physician Information:  Stephanie Lucio MD    Has the plan of care been signed (Y/N):        []  Yes  [x]  No     Date of Patient follow up with Physician:       Is this a Progress Report:     []  Yes  [x]  No        If Yes:  Date Range for reporting period:  Beginning  Ending    Progress report will be due (10 Rx or 30 days whichever is less): 33       Recertification will be due (POC Duration  / 90 days whichever is less): 10/9/23         Visit # Insurance Allowable Auth Required   2 mn []  Yes [x]  No          OUTCOME MEASURE DATE SCORE   LEFS 23 34% deficit            Latex Allergy:  [x]NO      []YES  Preferred Language for Healthcare:   [x]English       []other:    Pain level:  1-10/10     SUBJECTIVE:  Reports that her knee is doing okay. She notes that she is compliant with HEP. She did purchase a thigh high compression stocking. She notes that this has helped with her swelling. She is still walking approximately 2 miles/day. She is still having a fair amount of pain. She notes that she is concerned about her surgical scar. She notes that it seems to be elevated and she notes that it feels tight.     OBJECTIVE:   Observation:   Test measurements:      Flexibility L R Comment   Hamstring         Gastroc

## 2023-09-05 ENCOUNTER — HOSPITAL ENCOUNTER (OUTPATIENT)
Dept: PHYSICAL THERAPY | Age: 58
Setting detail: THERAPIES SERIES
Discharge: HOME | End: 2023-09-05
Payer: COMMERCIAL

## 2023-09-05 PROCEDURE — 97016 VASOPNEUMATIC DEVICE THERAPY: CPT | Performed by: PHYSICAL THERAPIST

## 2023-09-05 PROCEDURE — 97110 THERAPEUTIC EXERCISES: CPT | Performed by: PHYSICAL THERAPIST

## 2023-09-05 PROCEDURE — 97140 MANUAL THERAPY 1/> REGIONS: CPT | Performed by: PHYSICAL THERAPIST

## 2023-09-05 NOTE — FLOWSHEET NOTE
instruction to the patient for proper LE, core and proximal hip recruitment and positioning and eccentric body weight control with ambulation re-education including up and down stairs     Home Exercise Program:    [x] (45337) Reviewed/Progressed HEP activities related to strengthening, flexibility, endurance, ROM of core, proximal hip and LE for functional self-care, mobility, lifting and ambulation/stair navigation   [] (33100)Reviewed/Progressed HEP activities related to improving balance, coordination, kinesthetic sense, posture, motor skill, proprioception of core, proximal hip and LE for self care, mobility, lifting, and ambulation/stair navigation      Manual Treatments:  PROM / STM / Oscillations-Mobs:  G-I, II, III, IV (PA's, Inf., Post.)  [] (11303) Provided manual therapy to mobilize LE, proximal hip and/or LS spine soft tissue/joints for the purpose of modulating pain, promoting relaxation,  increasing ROM, reducing/eliminating soft tissue swelling/inflammation/restriction, improving soft tissue extensibility and allowing for proper ROM for normal function with self care, mobility, lifting and ambulation. Modalities:  gameready 15'    Charges:  Timed Code Treatment Minutes: 46'   Total Treatment Minutes: 10:48-12:25  80'       [] EVAL (LOW) 59346 (typically 20 minutes face-to-face)  [] EVAL (MOD) 59976 (typically 30 minutes face-to-face)  [] EVAL (HIGH) 29459 (typically 45 minutes face-to-face)  [] RE-EVAL   [x] ZM(42749) x  2  [] IONTO  [] NMR (72643) x     [x] VASO  [x] Manual (81892) x  1    [] Other:  [] TA x      [] Mech Traction (51480)  [] ES(attended) (09257)      [] ES (un) (04765):     GOALS:     Patient stated goal: return to riding bike, golfing and hiking without pain/dysfunction. [] Progressing: [] Met: [] Not Met: [] Adjusted     Therapist goals for Patient:   Short Term Goals: To be achieved in: 2 weeks  1.  Independent in HEP and progression per patient tolerance, in order to

## 2023-09-07 ENCOUNTER — HOSPITAL ENCOUNTER (OUTPATIENT)
Dept: PHYSICAL THERAPY | Age: 58
Setting detail: THERAPIES SERIES
Discharge: HOME | End: 2023-09-07
Payer: COMMERCIAL

## 2023-09-07 PROCEDURE — 97140 MANUAL THERAPY 1/> REGIONS: CPT | Performed by: PHYSICAL THERAPY ASSISTANT

## 2023-09-07 PROCEDURE — 97016 VASOPNEUMATIC DEVICE THERAPY: CPT | Performed by: PHYSICAL THERAPY ASSISTANT

## 2023-09-07 PROCEDURE — 97110 THERAPEUTIC EXERCISES: CPT | Performed by: PHYSICAL THERAPY ASSISTANT

## 2023-09-07 NOTE — FLOWSHEET NOTE
achieved in: 2 weeks  1. Independent in HEP and progression per patient tolerance, in order to prevent re-injury. [] Progressing: [] Met: [] Not Met: [] Adjusted   2. Patient will have a decrease in pain to facilitate improvement in movement, function, and ADLs as indicated by Functional Deficits. [] Progressing: [] Met: [] Not Met: [] Adjusted     Long Term Goals: To be achieved in: 10-12 weeks  1. Disability index score of <20% deficit on LEFS to assist with reaching prior level of function. [] Progressing: [] Met: [] Not Met: [] Adjusted  2. Patient will demonstrate increased AROM to 0-130 to allow for proper joint functioning as indicated by patients Functional Deficits. [] Progressing: [] Met: [] Not Met: [] Adjusted  3. Patient will demonstrate an increase in Strength to good proximal hip strength and control, within 5lb HHD in LE to allow for proper functional mobility as indicated by patients Functional Deficits. [] Progressing: [] Met: [] Not Met: [] Adjusted  4. Patient will return to all functional activities without increased symptoms or restriction. [] Progressing: [] Met: [] Not Met: [] Adjusted  5. Patient will be able to ambulate > 30 minutes with normal gait without AD/pain or dysfunction. [] Progressing: [] Met: [] Not Met: [] Adjusted  6. Patient will be able to manage a flight of stairs with normal gait without AD/pain or dysfunction. [] Progressing: [] Met: [] Not Met: [] Adjusted         Overall Progression Towards Functional goals/ Treatment Progress Update:  [] Patient is progressing as expected towards functional goals listed. [] Progression is slowed due to complexities/Impairments listed. [] Progression has been slowed due to co-morbidities.   [x] Plan just implemented, too soon to assess goals progression <30days   [] Goals require adjustment due to lack of progress  [] Patient is not progressing as expected and requires additional follow up with physician  []

## 2023-09-11 ENCOUNTER — HOSPITAL ENCOUNTER (OUTPATIENT)
Dept: PHYSICAL THERAPY | Age: 58
Setting detail: THERAPIES SERIES
Discharge: HOME | End: 2023-09-11
Payer: COMMERCIAL

## 2023-09-11 PROCEDURE — 97110 THERAPEUTIC EXERCISES: CPT | Performed by: PHYSICAL THERAPY ASSISTANT

## 2023-09-11 PROCEDURE — 97140 MANUAL THERAPY 1/> REGIONS: CPT | Performed by: PHYSICAL THERAPY ASSISTANT

## 2023-09-11 PROCEDURE — 97016 VASOPNEUMATIC DEVICE THERAPY: CPT | Performed by: PHYSICAL THERAPY ASSISTANT

## 2023-09-11 PROCEDURE — 97112 NEUROMUSCULAR REEDUCATION: CPT | Performed by: PHYSICAL THERAPY ASSISTANT

## 2023-09-11 NOTE — FLOWSHEET NOTE
[facility] and [address] [place] [address] [address],  [facility], [address] [place] [address]  [facility], [address] [place] [address] Avenue  Phone: [phone]  Fax: [phone]      Physical Therapy Daily Treatment Note  Date:  2023    Patient Name:  Rocio Connell    :  1965  MRN: [id number]  Restrictions/Precautions:    Medical/Treatment Diagnosis Information:  S/P left unicompartmental knee replacement [U35.319]  Treatment Diagnosis: M25.562, G89.29 (ICD-10-CM) - Chronic pain of left knee  Left medial compartment partial knee replacement 5/3/48  Insurance/Certification information:  PT Insurance Information: [facility]/$0 copay/mn/no auth  Physician Information:  Stephanie Lucio MD    Has the plan of care been signed (Y/N):        []  Yes  [x]  No     Date of Patient follow up with Physician: 23? Is this a Progress Report:     []  Yes  [x]  No        If Yes:  Date Range for reporting period:  Beginning  Ending    Progress report will be due (10 Rx or 30 days whichever is less): 79       Recertification will be due (POC Duration  / 90 days whichever is less): 10/9/23         Visit # Insurance Allowable Auth Required   5 mn []  Yes [x]  No          OUTCOME MEASURE DATE SCORE   LEFS 23 34% deficit            Latex Allergy:  [x]NO      []YES  Preferred Language for Healthcare:   [x]English       []other:    Pain level:  4-10/10     SUBJECTIVE:  1 month post op. High level pain is short lived when it does occur. States she attempted kneeling to clean carpet this weekend which was quite uncomfortable. Not sleeping well. She is wearing her compression stocking for edema control today. Challenged to get frequency of PROM in each day. She is complaint with HEP.     OBJECTIVE:   Observation:   Test measurements:      Flexibility L R Comment   Hamstring         Gastroc         ITB         Quad                      23          ROM PROM AROM

## 2023-09-14 ENCOUNTER — HOSPITAL ENCOUNTER (OUTPATIENT)
Dept: PHYSICAL THERAPY | Age: 58
Setting detail: THERAPIES SERIES
Discharge: HOME | End: 2023-09-14
Payer: COMMERCIAL

## 2023-09-14 PROCEDURE — 97016 VASOPNEUMATIC DEVICE THERAPY: CPT | Performed by: PHYSICAL THERAPIST

## 2023-09-14 PROCEDURE — 97140 MANUAL THERAPY 1/> REGIONS: CPT | Performed by: PHYSICAL THERAPIST

## 2023-09-14 PROCEDURE — 97112 NEUROMUSCULAR REEDUCATION: CPT | Performed by: PHYSICAL THERAPIST

## 2023-09-14 PROCEDURE — 97110 THERAPEUTIC EXERCISES: CPT | Performed by: PHYSICAL THERAPIST

## 2023-09-14 NOTE — FLOWSHEET NOTE
facilitate improvement in movement, function, and ADLs as indicated by Functional Deficits. [] Progressing: [] Met: [] Not Met: [] Adjusted     Long Term Goals: To be achieved in: 10-12 weeks  1. Disability index score of <20% deficit on LEFS to assist with reaching prior level of function. [] Progressing: [] Met: [] Not Met: [] Adjusted  2. Patient will demonstrate increased AROM to 0-130 to allow for proper joint functioning as indicated by patients Functional Deficits. [] Progressing: [] Met: [] Not Met: [] Adjusted  3. Patient will demonstrate an increase in Strength to good proximal hip strength and control, within 5lb HHD in LE to allow for proper functional mobility as indicated by patients Functional Deficits. [] Progressing: [] Met: [] Not Met: [] Adjusted  4. Patient will return to all functional activities without increased symptoms or restriction. [] Progressing: [] Met: [] Not Met: [] Adjusted  5. Patient will be able to ambulate > 30 minutes with normal gait without AD/pain or dysfunction. [] Progressing: [] Met: [] Not Met: [] Adjusted  6. Patient will be able to manage a flight of stairs with normal gait without AD/pain or dysfunction. [] Progressing: [] Met: [] Not Met: [] Adjusted         Overall Progression Towards Functional goals/ Treatment Progress Update:  [] Patient is progressing as expected towards functional goals listed. [] Progression is slowed due to complexities/Impairments listed. [] Progression has been slowed due to co-morbidities.   [x] Plan just implemented, too soon to assess goals progression <30days   [] Goals require adjustment due to lack of progress  [] Patient is not progressing as expected and requires additional follow up with physician  [] Other    Prognosis for POC: [x] Good [] Fair  [] Poor      Patient requires continued skilled intervention: [x] Yes  [] No    Treatment/Activity Tolerance:  [x] Patient able to complete treatment  [] Patient

## 2023-09-18 ENCOUNTER — HOSPITAL ENCOUNTER (OUTPATIENT)
Dept: PHYSICAL THERAPY | Age: 58
Setting detail: THERAPIES SERIES
Discharge: HOME | End: 2023-09-18
Payer: COMMERCIAL

## 2023-09-18 NOTE — FLOWSHEET NOTE
The [facility] and [facility] [address],  [facility], [address]  [address]  Phone: [phone]  Fax: [phone]      Physical Therapy  Cancellation/No-show Note  Patient Name:  Maru Voss  :  1965   Date:  2023  Cancelled visits to date: 1  No-shows to date: 0    For today's appointment patient:  [x]  Cancelled  []  Rescheduled appointment  []  No-show     Reason given by patient:  []  Patient ill  [x]  Conflicting appointment   []  No transportation    []  Conflict with work  []  No reason given  []  Other:     Comments:  Family emergency    Electronically signed by:  Odessa Brunson PTA

## 2023-09-21 ENCOUNTER — HOSPITAL ENCOUNTER (OUTPATIENT)
Dept: PHYSICAL THERAPY | Age: 58
Setting detail: THERAPIES SERIES
Discharge: HOME | End: 2023-09-21
Payer: COMMERCIAL

## 2023-09-21 PROCEDURE — 97110 THERAPEUTIC EXERCISES: CPT | Performed by: PHYSICAL THERAPY ASSISTANT

## 2023-09-21 PROCEDURE — 97140 MANUAL THERAPY 1/> REGIONS: CPT | Performed by: PHYSICAL THERAPY ASSISTANT

## 2023-09-21 PROCEDURE — 97112 NEUROMUSCULAR REEDUCATION: CPT | Performed by: PHYSICAL THERAPY ASSISTANT

## 2023-09-21 PROCEDURE — 97016 VASOPNEUMATIC DEVICE THERAPY: CPT | Performed by: PHYSICAL THERAPY ASSISTANT

## 2023-09-21 NOTE — FLOWSHEET NOTE
of core, proximal hip and LE for functional self-care, mobility, lifting and ambulation/stair navigation   [] (81276)Reviewed/Progressed HEP activities related to improving balance, coordination, kinesthetic sense, posture, motor skill, proprioception of core, proximal hip and LE for self care, mobility, lifting, and ambulation/stair navigation      Manual Treatments:  PROM / STM / Oscillations-Mobs:  G-I, II, III, IV (PA's, Inf., Post.)  [] (72565) Provided manual therapy to mobilize LE, proximal hip and/or LS spine soft tissue/joints for the purpose of modulating pain, promoting relaxation,  increasing ROM, reducing/eliminating soft tissue swelling/inflammation/restriction, improving soft tissue extensibility and allowing for proper ROM for normal function with self care, mobility, lifting and ambulation. Modalities:  gameready 15'    Charges:  Timed Code Treatment Minutes: 48'   Total Treatment Minutes: 9:17-11:00  103'       [] EVAL (LOW) 70289 (typically 20 minutes face-to-face)  [] EVAL (MOD) 94753 (typically 30 minutes face-to-face)  [] EVAL (HIGH) 28781 (typically 45 minutes face-to-face)  [] RE-EVAL   [x] ZP(57764) x  1  [] IONTO  [x] NMR (41759) x   1  [x] VASO  [x] Manual (20193) x  1    [] Other:  [] TA x      [] Mech Traction (21031)  [] ES(attended) (25444)      [] ES (un) (87089):     GOALS:     Patient stated goal: return to riding bike, golfing and hiking without pain/dysfunction. [] Progressing: [] Met: [] Not Met: [] Adjusted     Therapist goals for Patient:   Short Term Goals: To be achieved in: 2 weeks  1. Independent in HEP and progression per patient tolerance, in order to prevent re-injury. [] Progressing: [] Met: [] Not Met: [] Adjusted   2. Patient will have a decrease in pain to facilitate improvement in movement, function, and ADLs as indicated by Functional Deficits. [] Progressing: [] Met: [] Not Met: [] Adjusted     Long Term Goals: To be achieved in: 10-12 weeks  1.

## 2023-09-25 ENCOUNTER — OFFICE VISIT (OUTPATIENT)
Dept: ORTHOPEDIC SURGERY | Age: 58
End: 2023-09-25

## 2023-09-25 ENCOUNTER — HOSPITAL ENCOUNTER (OUTPATIENT)
Dept: PHYSICAL THERAPY | Age: 58
Setting detail: THERAPIES SERIES
Discharge: HOME | End: 2023-09-25
Payer: COMMERCIAL

## 2023-09-25 VITALS — HEIGHT: 63 IN | BODY MASS INDEX: 19.49 KG/M2 | RESPIRATION RATE: 16 BRPM | WEIGHT: 110 LBS

## 2023-09-25 DIAGNOSIS — Z96.652 STATUS POST LEFT PARTIAL KNEE REPLACEMENT: Primary | ICD-10-CM

## 2023-09-25 PROCEDURE — 97110 THERAPEUTIC EXERCISES: CPT | Performed by: PHYSICAL THERAPIST

## 2023-09-25 PROCEDURE — 97112 NEUROMUSCULAR REEDUCATION: CPT | Performed by: PHYSICAL THERAPIST

## 2023-09-25 PROCEDURE — 99024 POSTOP FOLLOW-UP VISIT: CPT | Performed by: ORTHOPAEDIC SURGERY

## 2023-09-25 PROCEDURE — 97016 VASOPNEUMATIC DEVICE THERAPY: CPT | Performed by: PHYSICAL THERAPIST

## 2023-09-25 PROCEDURE — 97140 MANUAL THERAPY 1/> REGIONS: CPT | Performed by: PHYSICAL THERAPIST

## 2023-09-25 RX ORDER — METHYLPREDNISOLONE 4 MG/1
TABLET ORAL
Qty: 1 KIT | Refills: 0 | Status: SHIPPED | OUTPATIENT
Start: 2023-09-25 | End: 2023-10-01

## 2023-09-25 NOTE — PROGRESS NOTES
[address] [facility]  Office Visit    Chief Complaint: Follow-up s/p left knee medial compartment arthroplasty    HPI:  Michael Gunter is a 62 y.o. who is here in follow-up of left knee medial compartment arthroplasty performed on August 9, 2023. She is doing well postoperatively and has been very active. She has been wearing thigh-high compression stockings and this seems to have irritated her knee incision over the last 1 to 2 days. Patient Active Problem List   Diagnosis    Allergic rhinitis    Anxiety state    Osteoarthritis of hip    Family history of coronary artery disease    Depressive disorder, not elsewhere classified    ADD (attention deficit disorder)    Recurrent major depressive disorder, in full remission ([facility])    Acute recurrent sinusitis    Acute medial meniscal tear, left, subsequent encounter    Closed fracture of medial plateau of left tibia, initial encounter    Essential hypertension       ROS:  Constitutional: denies fever, chills, weight loss  MSK: denies pain in other joints, muscle aches  Neurological: denies numbness, tingling, weakness    Exam:  Height 5' 3\" (1.6 m), weight 110 lb (49.9 kg)    Appearance: sitting in exam room chair, appears to be in no acute distress, awake and alert  Resp: unlabored breathing on room air  Skin: warm, dry and intact with out erythema or significant increased temperature  Neuro: grossly intact both lower extremities. Intact sensation to light touch. Motor exam 4+ to 5/5 in all major motor groups. Right knee: Incision is healing as expected. Present formation around the incision today. There are 2 small areas of bloody drainage. There is no knee effusion. Range of motion is 0 to 120 degrees. Sensation is intact light touch. There is brisk capillary refill. There is 5/5 muscle strength in all muscle groups.           Imaging:  None    Assessment:  S/p right medial compartment arthroplasty    Plan:  She is recovering well

## 2023-09-25 NOTE — FLOWSHEET NOTE
limited by pain     [] Patient limited by other medical complications  [x] Other: No complaints with today's program. Patient came into clinic with incision site bandaged today with anti-bacterial ointment in place. Increased redness along the incision site noticeable today compared to previous visits. Mild drainage is present. Pt is scheduled to see the referring physician today. ROM continues to improve. Progressed strengthening program today without problem. PLAN:   [x] Continue per plan of care [] Alter current plan (see comments above)  [] Plan of care initiated [] Hold pending MD visit [] Discharge  2x/week for 6 weeks      Electronically signed by:  Rachel Martinez, PT    Note: If patient does not return for scheduled/ recommended follow up visits, this note will serve as a discharge from care along with most recent update on progress.

## 2023-09-28 ENCOUNTER — HOSPITAL ENCOUNTER (OUTPATIENT)
Dept: PHYSICAL THERAPY | Age: 58
Setting detail: THERAPIES SERIES
Discharge: HOME | End: 2023-09-28
Payer: COMMERCIAL

## 2023-09-28 PROCEDURE — 97112 NEUROMUSCULAR REEDUCATION: CPT | Performed by: PHYSICAL THERAPIST

## 2023-09-28 PROCEDURE — 97140 MANUAL THERAPY 1/> REGIONS: CPT | Performed by: PHYSICAL THERAPIST

## 2023-09-28 PROCEDURE — 97110 THERAPEUTIC EXERCISES: CPT | Performed by: PHYSICAL THERAPIST

## 2023-09-28 PROCEDURE — 97016 VASOPNEUMATIC DEVICE THERAPY: CPT | Performed by: PHYSICAL THERAPIST

## 2023-09-28 NOTE — FLOWSHEET NOTE
The [facility] and [address] [place] [address] [address],  Sports [facility] Rehabilitation, [address] [place] [address]  200 [facility] [address] [place] [address] Avenue  Phone: [phone]  Fax: [phone]      Physical Therapy Daily Treatment Note  Date:  2023    Patient Name:  Florene Lesch    :  1965  MRN: [id number]  Restrictions/Precautions:    Medical/Treatment Diagnosis Information:  S/P left unicompartmental knee replacement [T01.615]  Treatment Diagnosis: M25.562, G89.29 (ICD-10-CM) - Chronic pain of left knee  Left medial compartment partial knee replacement 95  Insurance/Certification information:  PT Insurance Information: [facility]/$0 copay/mn/no auth  Physician Information:  Gretel Jack MD    Has the plan of care been signed (Y/N):        []  Yes  [x]  No     Date of Patient follow up with Physician: 23? Is this a Progress Report:     []  Yes  [x]  No        If Yes:  Date Range for reporting period:  Beginning  Ending    Progress report will be due (10 Rx or 30 days whichever is less):        Recertification will be due (POC Duration  / 90 days whichever is less): 10/9/23         Visit # Insurance Allowable Auth Required   9 mn []  Yes [x]  No          OUTCOME MEASURE DATE SCORE   LEFS 23 34% deficit            Latex Allergy:  [x]NO      []YES  Preferred Language for Healthcare:   [x]English       []other:    Pain level:  4-10/10     SUBJECTIVE:  7 weeks post op. Reports that she saw Dr Kam Oropeza and he was pleased with her progress. He did feel like the compression stocking was irritating her incision site. She was taken out of the compression stocking and was placed on a MDP to help reduce edema. She notes that her knee is feeling less swollen since beginning the MDP. She states that she is still noticing some drainage from the incision site. Compliant with HEP. She is scheduled to see Dr Kam Oropeza again in 1 month.       OBJECTIVE:   Observation:

## 2023-10-02 ENCOUNTER — OFFICE VISIT (OUTPATIENT)
Dept: ORTHOPEDIC SURGERY | Age: 58
End: 2023-10-02

## 2023-10-02 ENCOUNTER — HOSPITAL ENCOUNTER (OUTPATIENT)
Dept: PHYSICAL THERAPY | Age: 58
Setting detail: THERAPIES SERIES
Discharge: HOME | End: 2023-10-02
Payer: COMMERCIAL

## 2023-10-02 ENCOUNTER — TELEPHONE (OUTPATIENT)
Dept: ORTHOPEDIC SURGERY | Age: 58
End: 2023-10-02

## 2023-10-02 VITALS — HEIGHT: 63 IN | RESPIRATION RATE: 16 BRPM | BODY MASS INDEX: 19.49 KG/M2 | WEIGHT: 110 LBS

## 2023-10-02 DIAGNOSIS — Z96.652 STATUS POST LEFT PARTIAL KNEE REPLACEMENT: Primary | ICD-10-CM

## 2023-10-02 PROCEDURE — 97016 VASOPNEUMATIC DEVICE THERAPY: CPT | Performed by: PHYSICAL THERAPY ASSISTANT

## 2023-10-02 PROCEDURE — 97140 MANUAL THERAPY 1/> REGIONS: CPT | Performed by: PHYSICAL THERAPY ASSISTANT

## 2023-10-02 PROCEDURE — 97112 NEUROMUSCULAR REEDUCATION: CPT | Performed by: PHYSICAL THERAPY ASSISTANT

## 2023-10-02 PROCEDURE — 99024 POSTOP FOLLOW-UP VISIT: CPT | Performed by: ORTHOPAEDIC SURGERY

## 2023-10-02 PROCEDURE — 97110 THERAPEUTIC EXERCISES: CPT | Performed by: PHYSICAL THERAPY ASSISTANT

## 2023-10-02 RX ORDER — CEPHALEXIN 500 MG/1
500 CAPSULE ORAL 3 TIMES DAILY
Qty: 30 CAPSULE | Refills: 0 | Status: SHIPPED | OUTPATIENT
Start: 2023-10-02 | End: 2023-10-12

## 2023-10-02 NOTE — FLOWSHEET NOTE
The [facility] and [address] [place] [address] [address],  Sports [facility] Rehabilitation, [address] [place] [address]  200 [facility], [address] [place] [address] Avenue  Phone: [phone]  Fax: [phone]      Physical Therapy Daily Treatment Note  Date:  10/2/2023    Patient Name:  Patel Hdez    :  1965  MRN: [id number]  Restrictions/Precautions:    Medical/Treatment Diagnosis Information:  S/P left unicompartmental knee replacement [T54.666]  Treatment Diagnosis: M25.562, G89.29 (ICD-10-CM) - Chronic pain of left knee  Left medial compartment partial knee replacement 95  Insurance/Certification information:  PT Insurance Information: [facility]/$0 copay/mn/no auth  Physician Information:  Cha Brand MD    Has the plan of care been signed (Y/N):        []  Yes  [x]  No     Date of Patient follow up with Physician: 23? Is this a Progress Report:     []  Yes  [x]  No        If Yes:  Date Range for reporting period:  Beginning  Ending    Progress report will be due (10 Rx or 30 days whichever is less):        Recertification will be due (POC Duration  / 90 days whichever is less): 10/9/23         Visit # Insurance Allowable Auth Required   10 mn []  Yes [x]  No          OUTCOME MEASURE DATE SCORE   LEFS 23 34% deficit            Latex Allergy:  [x]NO      []YES  Preferred Language for Healthcare:   [x]English       []other:    Pain level:  4-10/10     SUBJECTIVE:  7 weeks post op. Continuing to have some drainage/redness along incision. Pt plans to call Dr lEder Robles regarding possibility of antibiotic. Reports that she saw Dr Elder Robles and he was pleased with her progress. He did feel like the compression stocking was irritating her incision site. She was taken out of the compression stocking and was placed on a MDP to help reduce edema. She notes that her knee is feeling less swollen since beginning the MDP.   She states that she is still noticing some drainage from the

## 2023-10-02 NOTE — TELEPHONE ENCOUNTER
General Question     Subject: Patient called in stating that there is swelling, redness,and discharge coming from left knee where they recently had surgery  Patient and /or Facility Request: [address] [place] [address] Number: [phone]

## 2023-10-02 NOTE — PROGRESS NOTES
[address] [facility]  Office Visit    Chief Complaint: Follow-up s/p left knee medial compartment arthroplasty    HPI:  Jonathan Jolly is a 62 y.o. who is here in follow-up of left knee medial compartment arthroplasty performed on August 9, 2023. She comes in today in follow-up for a wound check. She was seen last week at which time she had inflammation and redness around the knee. She took a steroid Dosepak. The redness and inflammation is decreased but she continues to have small scabs on the incision line. He has had a sore when touched. The biggest 1 is superior. She otherwise is not having pain and walks without assistive device. Exam:  Height 5' 3\" (1.6 m), weight 110 lb (49.9 kg)    Appearance: sitting in exam room chair, appears to be in no acute distress, awake and alert  Resp: unlabored breathing on room air  Skin: warm, dry and intact with out erythema or significant increased temperature  Neuro: grossly intact both lower extremities. Intact sensation to light touch. Motor exam 4+ to 5/5 in all major motor groups. Right knee: Incision nearly healed except there are small scabs along the superior portion of the incision. There are some in the midportion and inferior portion as well. There is no fluctuance or drainage. She is sensitive over the incision. There is no knee effusion. Range of motion is 0 to 120 degrees. Sensation is intact light touch. There is brisk capillary refill. There is 5/5 muscle strength in all muscle groups. Imaging:  None    Assessment:  S/p right medial compartment arthroplasty    Plan:  She is recovering well postoperatively. Her incision has small areas of dehiscence with no signs of infection. She was placed on antibiotics today to help prevent infection. Steri-Strips were also applied. She will follow-up in 1 week for repeat assessment of her incision. She will otherwise continue with physical therapy exercises.     This

## 2023-10-05 ENCOUNTER — HOSPITAL ENCOUNTER (OUTPATIENT)
Dept: PHYSICAL THERAPY | Age: 58
Setting detail: THERAPIES SERIES
Discharge: HOME | End: 2023-10-05
Payer: COMMERCIAL

## 2023-10-05 PROCEDURE — 97016 VASOPNEUMATIC DEVICE THERAPY: CPT | Performed by: PHYSICAL THERAPY ASSISTANT

## 2023-10-05 PROCEDURE — 97110 THERAPEUTIC EXERCISES: CPT | Performed by: PHYSICAL THERAPY ASSISTANT

## 2023-10-05 PROCEDURE — 97112 NEUROMUSCULAR REEDUCATION: CPT | Performed by: PHYSICAL THERAPY ASSISTANT

## 2023-10-05 NOTE — FLOWSHEET NOTE
to strengthening, flexibility, endurance, ROM of core, proximal hip and LE for functional self-care, mobility, lifting and ambulation/stair navigation   [] (17686)Reviewed/Progressed HEP activities related to improving balance, coordination, kinesthetic sense, posture, motor skill, proprioception of core, proximal hip and LE for self care, mobility, lifting, and ambulation/stair navigation      Manual Treatments:  PROM / STM / Oscillations-Mobs:  G-I, II, III, IV (PA's, Inf., Post.)  [] (21443) Provided manual therapy to mobilize LE, proximal hip and/or LS spine soft tissue/joints for the purpose of modulating pain, promoting relaxation,  increasing ROM, reducing/eliminating soft tissue swelling/inflammation/restriction, improving soft tissue extensibility and allowing for proper ROM for normal function with self care, mobility, lifting and ambulation. Modalities:  gameready 15'    Charges:  Timed Code Treatment Minutes: 48'   Total Treatment Minutes: 10:00-11:35  95'       [] EVAL (LOW) 95059 (typically 20 minutes face-to-face)  [] EVAL (MOD) 45069 (typically 30 minutes face-to-face)  [] EVAL (HIGH) 66187 (typically 45 minutes face-to-face)  [] RE-EVAL   [x] AA(57085) x  2  [] IONTO  [x] NMR (60007) x   1  [x] VASO  [] Manual (45280) x      [] Other:  [] TA x      [] Mech Traction (66073)  [] ES(attended) (10704)      [] ES (un) (25290):     GOALS:     Patient stated goal: return to riding bike, golfing and hiking without pain/dysfunction. [] Progressing: [] Met: [] Not Met: [] Adjusted     Therapist goals for Patient:   Short Term Goals: To be achieved in: 2 weeks  1. Independent in HEP and progression per patient tolerance, in order to prevent re-injury. [] Progressing: [] Met: [] Not Met: [] Adjusted   2. Patient will have a decrease in pain to facilitate improvement in movement, function, and ADLs as indicated by Functional Deficits.     [] Progressing: [] Met: [] Not Met: [] Adjusted     Long Term

## 2023-10-09 ENCOUNTER — HOSPITAL ENCOUNTER (OUTPATIENT)
Dept: PHYSICAL THERAPY | Age: 58
Setting detail: THERAPIES SERIES
Discharge: HOME | End: 2023-10-09
Payer: COMMERCIAL

## 2023-10-09 ENCOUNTER — OFFICE VISIT (OUTPATIENT)
Dept: ORTHOPEDIC SURGERY | Age: 58
End: 2023-10-09

## 2023-10-09 VITALS — HEIGHT: 63 IN | BODY MASS INDEX: 19.49 KG/M2 | WEIGHT: 110 LBS | RESPIRATION RATE: 17 BRPM

## 2023-10-09 DIAGNOSIS — Z96.652 STATUS POST LEFT PARTIAL KNEE REPLACEMENT: Primary | ICD-10-CM

## 2023-10-09 PROCEDURE — 97112 NEUROMUSCULAR REEDUCATION: CPT | Performed by: PHYSICAL THERAPIST

## 2023-10-09 PROCEDURE — 99024 POSTOP FOLLOW-UP VISIT: CPT | Performed by: ORTHOPAEDIC SURGERY

## 2023-10-09 PROCEDURE — 97110 THERAPEUTIC EXERCISES: CPT | Performed by: PHYSICAL THERAPIST

## 2023-10-09 PROCEDURE — 97016 VASOPNEUMATIC DEVICE THERAPY: CPT | Performed by: PHYSICAL THERAPIST

## 2023-10-10 NOTE — PROGRESS NOTES
[address] [facility]  Office Visit    Chief Complaint: Follow-up s/p left knee medial compartment arthroplasty    HPI:  Rocio Connell is a 62 y.o. who is here in follow-up of left knee medial compartment arthroplasty performed on August 9, 2023. She comes in today for a wound check. She was seen last week at which time she had inflammation and redness around the knee. She was prescribed Keflex which she will be finishing in the next 2 days. She reports that it is improving. She has less drainage now. She remains very active. Exam:  Height 5' 3\" (1.6 m), weight 110 lb (49.9 kg)    Appearance: sitting in exam room chair, appears to be in no acute distress, awake and alert  Resp: unlabored breathing on room air  Skin: warm, dry and intact with out erythema or significant increased temperature  Neuro: grossly intact both lower extremities. Intact sensation to light touch. Motor exam 4+ to 5/5 in all major motor groups. Right knee: Incision nearly healed except there are small scabs along the superior portion of the incision. There are some in the midportion and inferior portion as well. There is no fluctuance or drainage. There is no knee effusion. Range of motion is 0 to 120 degrees. Sensation is intact light touch. There is brisk capillary refill. There is 5/5 muscle strength in all muscle groups. Imaging:  None    Assessment:  S/p right medial compartment arthroplasty    Plan:  She is recovering well postoperatively. Her incision has small areas of dehiscence with no signs of infection. These continue to slowly heal.  She will follow-up for repeat assessment in 10 to 14 days. This dictation was done with [employer] dictation and may contain mechanical errors related to translation.

## 2023-10-12 ENCOUNTER — HOSPITAL ENCOUNTER (OUTPATIENT)
Dept: PHYSICAL THERAPY | Age: 58
Setting detail: THERAPIES SERIES
Discharge: HOME | End: 2023-10-12
Payer: COMMERCIAL

## 2023-10-12 NOTE — FLOWSHEET NOTE
[facility] and [address] [place] [address] [address],  Sports [facility], [address] [place] [address], [address] [place] [address] Avenue  Phone: [phone]  Fax: [phone]      Physical Therapy  Cancellation/No-show Note  Patient Name:  Tor Clinton  :  1965   Date:  10/12/2023  Cancelled visits to date: 0  No-shows to date: 0    For today's appointment patient:  [x]  Cancelled  []  Rescheduled appointment  []  No-show     Reason given by patient:  [x]  Patient ill  []  Conflicting appointment   []  No transportation    []  Conflict with work  []  No reason given  []  Other:     Comments:      Electronically signed by:  Loi Stiles PT

## 2023-10-23 ENCOUNTER — OFFICE VISIT (OUTPATIENT)
Dept: ORTHOPEDIC SURGERY | Age: 58
End: 2023-10-23

## 2023-10-23 ENCOUNTER — TELEPHONE (OUTPATIENT)
Dept: ORTHOPEDIC SURGERY | Age: 58
End: 2023-10-23

## 2023-10-23 VITALS — HEIGHT: 63 IN | BODY MASS INDEX: 19.49 KG/M2 | RESPIRATION RATE: 16 BRPM | WEIGHT: 110 LBS

## 2023-10-23 DIAGNOSIS — Z96.652 S/P LEFT UNICOMPARTMENTAL KNEE REPLACEMENT: Primary | ICD-10-CM

## 2023-10-23 PROCEDURE — 99024 POSTOP FOLLOW-UP VISIT: CPT | Performed by: ORTHOPAEDIC SURGERY

## 2023-10-23 NOTE — PROGRESS NOTES
[address] [facility]  Office Visit    Chief Complaint: Follow-up s/p left knee medial compartment arthroplasty    HPI:  Maria Del Rosario Diego is a 62 y.o. who is here in follow-up of left knee medial compartment arthroplasty performed on August 9, 2023. She comes in today for a wound check. She continues improved. She was reported last week for simulation. She is off of antibiotics and feels that the incision appears improving. She is having minimal pain. She is doing physical therapy on her own. She is returned to work continues. Exam:  Height 5' 3\" (1.6 m), weight 110 lb (49.9 kg)    Appearance: sitting in exam room chair, appears to be in no acute distress, awake and alert  Resp: unlabored breathing on room air  Skin: warm, dry and intact with out erythema or significant increased temperature  Neuro: grossly intact both lower extremities. Intact sensation to light touch. Motor exam 4+ to 5/5 in all major motor groups. Right knee: Incision nearly healed except there are small scabs along the superior portion of the incision. There is no erythema. There are some in the midportion and inferior portion as well. There is no fluctuance or drainage. There is no knee effusion. Range of motion is 0 to 120 degrees. Sensation is intact light touch. There is brisk capillary refill. There is 5/5 muscle strength in all muscle groups. Imaging:  None    Assessment:  S/p right medial compartment arthroplasty    Plan:  She is recovering well postoperatively. Her incision has small areas of dehiscence with no signs of infection. The incision continues to heal.  She will follow-up in 3 weeks for repeat assessment and radiographs. This dictation was done with [employer] dictation and may contain mechanical errors related to translation.

## 2023-11-13 ENCOUNTER — OFFICE VISIT (OUTPATIENT)
Dept: ORTHOPEDIC SURGERY | Age: 58
End: 2023-11-13
Payer: COMMERCIAL

## 2023-11-13 VITALS — BODY MASS INDEX: 19.49 KG/M2 | HEIGHT: 63 IN | RESPIRATION RATE: 16 BRPM | WEIGHT: 110 LBS

## 2023-11-13 DIAGNOSIS — Z96.652 S/P LEFT UNICOMPARTMENTAL KNEE REPLACEMENT: Primary | ICD-10-CM

## 2023-11-13 DIAGNOSIS — M25.571 RIGHT ANKLE PAIN, UNSPECIFIED CHRONICITY: ICD-10-CM

## 2023-11-13 PROCEDURE — G8420 CALC BMI NORM PARAMETERS: HCPCS | Performed by: PHYSICIAN ASSISTANT

## 2023-11-13 PROCEDURE — G8484 FLU IMMUNIZE NO ADMIN: HCPCS | Performed by: PHYSICIAN ASSISTANT

## 2023-11-13 PROCEDURE — G8427 DOCREV CUR MEDS BY ELIG CLIN: HCPCS | Performed by: PHYSICIAN ASSISTANT

## 2023-11-13 PROCEDURE — 3017F COLORECTAL CA SCREEN DOC REV: CPT | Performed by: PHYSICIAN ASSISTANT

## 2023-11-13 PROCEDURE — 1036F TOBACCO NON-USER: CPT | Performed by: PHYSICIAN ASSISTANT

## 2023-11-13 PROCEDURE — 99213 OFFICE O/P EST LOW 20 MIN: CPT | Performed by: PHYSICIAN ASSISTANT

## 2024-10-17 ENCOUNTER — TELEPHONE (OUTPATIENT)
Dept: ORTHOPEDIC SURGERY | Age: 59
End: 2024-10-17

## 2024-10-17 ENCOUNTER — OFFICE VISIT (OUTPATIENT)
Dept: ORTHOPEDIC SURGERY | Age: 59
End: 2024-10-17
Payer: COMMERCIAL

## 2024-10-17 VITALS — HEIGHT: 63 IN | WEIGHT: 110 LBS | BODY MASS INDEX: 19.49 KG/M2

## 2024-10-17 DIAGNOSIS — S46.211A BICEPS RUPTURE, PROXIMAL, RIGHT, INITIAL ENCOUNTER: ICD-10-CM

## 2024-10-17 DIAGNOSIS — M25.511 RIGHT SHOULDER PAIN, UNSPECIFIED CHRONICITY: Primary | ICD-10-CM

## 2024-10-17 PROCEDURE — 3017F COLORECTAL CA SCREEN DOC REV: CPT | Performed by: PHYSICIAN ASSISTANT

## 2024-10-17 PROCEDURE — G8420 CALC BMI NORM PARAMETERS: HCPCS | Performed by: PHYSICIAN ASSISTANT

## 2024-10-17 PROCEDURE — G8427 DOCREV CUR MEDS BY ELIG CLIN: HCPCS | Performed by: PHYSICIAN ASSISTANT

## 2024-10-17 PROCEDURE — 99203 OFFICE O/P NEW LOW 30 MIN: CPT | Performed by: PHYSICIAN ASSISTANT

## 2024-10-17 PROCEDURE — G8484 FLU IMMUNIZE NO ADMIN: HCPCS | Performed by: PHYSICIAN ASSISTANT

## 2024-10-17 PROCEDURE — 1036F TOBACCO NON-USER: CPT | Performed by: PHYSICIAN ASSISTANT

## 2024-10-17 RX ORDER — LOSARTAN POTASSIUM 25 MG/1
25 TABLET ORAL DAILY
COMMUNITY
Start: 2024-05-29

## 2024-10-17 NOTE — TELEPHONE ENCOUNTER
Patient called and VM was left stating that we will see her after MRI is completed but can help get her appt set up. Call back number was given.

## 2024-10-17 NOTE — TELEPHONE ENCOUNTER
Appointment Request     Patient requesting earlier appointment: Yes  Appointment offered to patient: yes  Patient Contact Number: [phone]     Terrance

## 2024-10-17 NOTE — TELEPHONE ENCOUNTER
Other PATIENT CALLING REGARDING HER MRI SAYS SHE CAN'T SCHEDULE THE MRI UNTIL 10/31/2024 AND DOES NOT WANT TO WAIT UNTIL NOVEMBER TO SEE THE DR. PATIENT IS REQUESTING TO SPEAK WITH SOMEONE IN THE OFFICE REGARDING THE MATTER PLEASE CALL PATIENT AT  [phone]

## 2024-10-17 NOTE — PROGRESS NOTES
at the side 5/5, Champagne toast testing 5/5, Jobes test 5/5    Special Tests:   No Elijah muscle deformity.    Neurovascular: Sensation to light touch is intact, no motor deficits, palpable radial pulses 2+    Laboratory:  No visits with results within 14 Day(s) from this visit.   Latest known visit with results is:   Admission on 08/09/2023, Discharged on 08/09/2023   Component Date Value    ABO/Rh 08/09/2023 O POS     Antibody Screen 08/09/2023 NEG     POC Glucose 08/09/2023 117 (H)     Performed on 08/09/2023 ACCU-CHEK       No results found for this or any previous visit (from the past 24 hour(s)).    Radiographic:  3 xray views of the right  shoulder including True AP in internal and external and axillary lateral were taken in our office today reveal no fractures, dislocations, visible tumors, or signs of acute trauma.  Well-preserved joint space    Self assessment questionnaires including ASES and Simple Shoulder Test were completed today.    Assessment:  Mary Nascimento is a 59 y.o. female who sustained an acute injury while working out currently with RIGHT shoulder pain related to an acute proximal biceps rupture.  An MRI is warranted to evaluate the biceps along with surrounding tissues for any injuries. This will need to be done in a timely manner.    Impression:  Encounter Diagnoses   Name Primary?    Right shoulder pain, unspecified chronicity Yes    Biceps rupture, proximal, right, initial encounter        Office Procedures:  Orders Placed This Encounter   Procedures    XR SHOULDER RIGHT (MIN 2 VIEWS)     Standing Status:   Future     Number of Occurrences:   1     Standing Expiration Date:   10/15/2025     Order Specific Question:   Reason for exam:     Answer:   right shoulder pain    MRI SHOULDER RIGHT WO CONTRAST     Standing Status:   Future     Standing Expiration Date:   10/17/2025     Order Specific Question:   Reason for exam:     Answer:   Proscan NKY       Plan:  Pertinent imaging was

## 2024-10-21 ENCOUNTER — TELEPHONE (OUTPATIENT)
Dept: ORTHOPEDIC SURGERY | Age: 59
End: 2024-10-21

## 2024-10-21 NOTE — TELEPHONE ENCOUNTER
General Question     Subject: 11/04/2024 1:15PM  Patient and /or Facility Request: Mary Nascimento   Contact Number: [phone]      PATIENT CALLING SAYS SHE SPOKE WITH SOMEONE IN OFFICE WHO SAYS 11/4/2024 1:15PM WAS AVAILABLE FOR HER SAYS SHE SPOKE WITH SOMEONE LAST WEEK ABOUT THIS DAY AND IT WAS SUPPOSED TO BE SCHEDULED FOR HER. PLEASE CALL PATIENT BACK AT ABOVE NUMBER.

## 2024-11-04 ENCOUNTER — OFFICE VISIT (OUTPATIENT)
Dept: ORTHOPEDIC SURGERY | Age: 59
End: 2024-11-04
Payer: COMMERCIAL

## 2024-11-04 VITALS — WEIGHT: 110 LBS | BODY MASS INDEX: 19.49 KG/M2 | HEIGHT: 63 IN

## 2024-11-04 DIAGNOSIS — S46.011A TRAUMATIC COMPLETE TEAR OF RIGHT ROTATOR CUFF, INITIAL ENCOUNTER: Primary | ICD-10-CM

## 2024-11-04 DIAGNOSIS — S46.211A BICEPS RUPTURE, PROXIMAL, RIGHT, INITIAL ENCOUNTER: ICD-10-CM

## 2024-11-04 DIAGNOSIS — M25.511 ACUTE PAIN OF RIGHT SHOULDER: ICD-10-CM

## 2024-11-04 PROCEDURE — 1036F TOBACCO NON-USER: CPT | Performed by: PHYSICIAN ASSISTANT

## 2024-11-04 PROCEDURE — 99214 OFFICE O/P EST MOD 30 MIN: CPT | Performed by: PHYSICIAN ASSISTANT

## 2024-11-04 PROCEDURE — G8427 DOCREV CUR MEDS BY ELIG CLIN: HCPCS | Performed by: PHYSICIAN ASSISTANT

## 2024-11-04 PROCEDURE — G8420 CALC BMI NORM PARAMETERS: HCPCS | Performed by: PHYSICIAN ASSISTANT

## 2024-11-04 PROCEDURE — 3017F COLORECTAL CA SCREEN DOC REV: CPT | Performed by: PHYSICIAN ASSISTANT

## 2024-11-04 PROCEDURE — G8484 FLU IMMUNIZE NO ADMIN: HCPCS | Performed by: PHYSICIAN ASSISTANT

## 2024-11-04 RX ORDER — FLUOROURACIL 50 MG/G
CREAM TOPICAL
COMMUNITY
Start: 2024-11-01

## 2024-11-04 RX ORDER — TACROLIMUS 1 MG/G
OINTMENT TOPICAL
COMMUNITY
Start: 2024-11-01

## 2024-11-04 NOTE — PROGRESS NOTES
[facility]  History and Physical  Shoulder Pain    Date:  2024    Name:  Mary Nascimento  Address:  [address] [place] [id number]    :  1965      Age:   59 y.o.    SSN:  [id number]      Medical Record Number:  [id number]    Reason for Visit:    Shoulder Pain (RIGHT SHOULDER MRI)      HPI:   Mary Nascimento is a 59 y.o. female who presents to our office today for follow up of the right shoulder pain.  This patient was suspected of having an acute biceps rupture following hearing a injury to the right shoulder while working out nearly 6 weeks ago.  The patient felt a pop in her right shoulder while she was doing body resistant exercises in a plank position.  The patient was asked to get an MRI completed and presents today to review the results.  She denies any new injuries since then.      Pain Assessment  Location of Pain: Shoulder  Location Modifiers: Right  Relieving Factors: Rest  Work-Related Injury: No  Are there other pain locations you wish to document?: No        Review of Systems:  A 14 point review of systems available in the scanned medical record as documented by the patient and reviewed on 2024.  The review is negative with the exception of those things mentioned in the History of Present Illness and Past Medical History.      Past Medical History:  Patient's medications, allergies, past medical, surgical, social and family histories were reviewed and updated as appropriate.    Allergies:  Allergies   Allergen Reactions    Phenergan [Promethazine Hcl] Seizure    Codeine Hives    Penicillins Hives     Can tolerate amoxicillin    Sulfa Antibiotics Rash    Sulfamethoxazole-Trimethoprim Hives    Diclofenac Rash       Physical Exam:  There were no vitals filed for this visit.  General: Mary Nascimento is a healthy and well appearing 59 y.o. female who is sitting comfortably in our office in acute distress.     Skin:  There

## 2024-11-05 ENCOUNTER — PREP FOR PROCEDURE (OUTPATIENT)
Dept: ORTHOPEDIC SURGERY | Age: 59
End: 2024-11-05

## 2024-11-05 ENCOUNTER — TELEPHONE (OUTPATIENT)
Dept: ORTHOPEDIC SURGERY | Age: 59
End: 2024-11-05

## 2024-11-05 DIAGNOSIS — S46.211A BICEPS RUPTURE, PROXIMAL, RIGHT, INITIAL ENCOUNTER: ICD-10-CM

## 2024-11-05 DIAGNOSIS — S46.011A TRAUMATIC COMPLETE TEAR OF RIGHT ROTATOR CUFF, INITIAL ENCOUNTER: Primary | ICD-10-CM

## 2024-11-05 NOTE — TELEPHONE ENCOUNTER
General Question     Subject: LEAVE FROM WORK   Patient and /or Facility Request: Mary Nascimento   Contact Number: [phone]     PATIENT REQUESTING A CALL BACK FROM SOMEONE IN DR MALDONADO'S OFFICE WANTING TO KNOW HOW LONG SHE'LL BE OFF OF WORK POST-OP SURGERY     PLEASE CALL THE PATIENT BACK AT THE ABOVE NUMBER

## 2024-11-06 ENCOUNTER — TELEPHONE (OUTPATIENT)
Dept: ORTHOPEDIC SURGERY | Age: 59
End: 2024-11-06

## 2024-11-12 ENCOUNTER — OFFICE VISIT (OUTPATIENT)
Dept: ORTHOPEDIC SURGERY | Age: 59
End: 2024-11-12

## 2024-11-12 VITALS — WEIGHT: 110 LBS | BODY MASS INDEX: 19.49 KG/M2 | HEIGHT: 63 IN

## 2024-11-12 DIAGNOSIS — S46.011A TRAUMATIC COMPLETE TEAR OF RIGHT ROTATOR CUFF, INITIAL ENCOUNTER: Primary | ICD-10-CM

## 2024-11-12 RX ORDER — LOSARTAN POTASSIUM 50 MG/1
TABLET ORAL
COMMUNITY
Start: 2024-11-11

## 2024-11-12 NOTE — H&P (VIEW-ONLY)
Chief Complaint    Shoulder Pain (PRE OP RIGHT SHOULDER)      History of Present Illness:  Mary Nascimento is a pleasant, 59 y.o., female, here today for follow up of her right shoulder. The patient sustained a traumatic biceps tendon rupture reaching for a weight during a workout. She saw Sushma Nagar, PA-C who examined her and suspected a rotator cuff tear. This was confirmed on recent MRI. The patient is scheduled for surgery and this is her preoperative appointment. Patient will be undergoing a Right shoulder arthroscopy, rotator cuff repair  with biceps tenodesis. She reports no new injuries or setbacks.     Pain Assessment  Location of Pain: Shoulder  Location Modifiers: Right  Severity of Pain: 0  Aggravating Factors: Other (Comment)  Limiting Behavior: Yes  Relieving Factors: Rest  Result of Injury: Yes  Work-Related Injury: No  Are there other pain locations you wish to document?: No      Medical History:  Patient's medications, allergies, past medical, surgical, social and family histories were reviewed and updated as appropriate.    No notes on file    Review of Systems  A 14 point review of systems was completed by the patient and is available in the media section of the scanned medical record and was reviewed on 11/12/2024.  The review is negative with the exception of those things mentioned in the HPI and Past Medical History    Vital Signs:  There were no vitals filed for this visit.    General/Appearance: Alert and oriented and in no apparent distress.    Skin:  There are no skin lesions, cellulitis, or extreme edema. The patient has warm and well-perfused Bilateral upper extremities with brisk capillary refill.      Right Shoulder Exam:  Inspection:  No gross deformities, no signs of infection.    Palpation: Tenderness over cuff. No obvious crepitus.    Active Range of Motion:  Forward Elevation 140, Abduction 120, External Rotation 40, Internal Rotation T12    Passive Range of Motion: Forward

## 2024-11-12 NOTE — PROGRESS NOTES
likelihood of biceps repair. Postoperative she will need to protect the arm while enrolling in PT.     Risks, benefits and potential complications of arthroscopic shoulder surgery were discussed with the patient.  Risks discussed include but are not limited to bleeding, infection, anesthetic risk, injury to nerves and blood vessels, deep vein thrombosis, residual stiffness and weakness, and the need for revision surgery. The patient also understands that anesthetic risks include cardiopulmonary issues, drug reactions and even death. The patient voices an understanding of the importance of physical therapy and home exercises after surgery.   All questions were answered and written informed consent for surgery was obtained today.     We will see Mary back in 1-2 weeks and/or as needed. All questions were answered to patient's satisfaction and She was encouraged to call with any further questions or concerns. Mary Nascimento is in agreement with this plan.    11/12/2024  5:40 PM    Jose Manuel rUias ATC    [facility]    During this examination, Jose Manuel TESFAYE ATC, functioned as a scribe for Dr. Oscar Alonso. The history taking and physical examination were performed by Dr. Alonso. All counseling during the appointment was performed between the patient and Dr. Alonso. 11/12/24  ______________    I, Dr. Oscar Alonso, personally performed the services described in this documentation as described by Jose Manuel Urias ATC in my presence, and it is both accurate and complete.    Oscar Alonso MD, PhD  11/12/2024

## 2024-11-14 NOTE — PROGRESS NOTES
[facility] PRE-SURGICAL TESTING INSTRUCTIONS                      PRIOR TO PROCEDURE DATE:    1. PLEASE FOLLOW ANY INSTRUCTIONS GIVEN TO YOU PER YOUR SURGEON.      2. Arrange for someone to drive you home and be with you for the first 24 hours after discharge for your safety after your procedure for which you received sedation. Ensure it is someone we can share information with regarding your discharge.     NOTE: At this time ONLY 2 ADULTS may accompany you   One person ENCOURAGED to stay at hospital entire time if outpatient surgery      3. You must contact your surgeon for instructions IF:  You are taking any blood thinners, aspirin, anti-inflammatory or vitamins.  There is a change in your physical condition such as a cold, fever, rash, cuts, sores, or any other infection, especially near your surgical site.    4. Do not drink alcohol the day before or day of your procedure.  Do not use any recreational marijuana at least 24 hours or street drugs (heroin, cocaine) at minimum 5 days prior to your procedure.     5. A Pre-Surgical History and Physical MUST be completed WITHIN 30 DAYS OR LESS prior to your procedure.by your Physician or an Urgent Care        THE DAY OF YOUR PROCEDURE:  1.  Follow instructions for ARRIVAL TIME as DIRECTED BY YOUR SURGEON.     2. Enter the MAIN entrance from [facility] and follow the signs to the free Parking Garage or  Parking (offered free of charge 7 am-5pm).      3. Enter the Main Entrance of the hospital (do not enter from the lower level of the parking garage). Upon entrance, check in with the  at the surgical information desk on your LEFT.   Bring your insurance card and photo ID to register      4. DO NOT EAT ANYTHING 8 hours prior to arrival for surgery.  You may have up to 8 ounces of water 4 hours prior to your arrival for surgery.   NOTE: ALL Gastric, Bariatric & Bowel surgery patients - you MUST follow your surgeon's instructions regarding

## 2024-11-19 ENCOUNTER — HOSPITAL ENCOUNTER (OUTPATIENT)
Age: 59
Setting detail: OUTPATIENT SURGERY
Discharge: HOME | End: 2024-11-19
Attending: ORTHOPAEDIC SURGERY | Admitting: ORTHOPAEDIC SURGERY
Payer: COMMERCIAL

## 2024-11-19 ENCOUNTER — ANESTHESIA (OUTPATIENT)
Dept: OPERATING ROOM | Age: 59
End: 2024-11-19
Payer: COMMERCIAL

## 2024-11-19 ENCOUNTER — ANESTHESIA EVENT (OUTPATIENT)
Dept: OPERATING ROOM | Age: 59
End: 2024-11-19
Payer: COMMERCIAL

## 2024-11-19 VITALS
BODY MASS INDEX: 20.38 KG/M2 | SYSTOLIC BLOOD PRESSURE: 113 MMHG | TEMPERATURE: 97.6 F | WEIGHT: 115 LBS | HEART RATE: 57 BPM | DIASTOLIC BLOOD PRESSURE: 76 MMHG | RESPIRATION RATE: 16 BRPM | HEIGHT: 63 IN | OXYGEN SATURATION: 97 %

## 2024-11-19 DIAGNOSIS — S46.011A STRAIN OF TENDON OF RIGHT ROTATOR CUFF, INITIAL ENCOUNTER: Primary | ICD-10-CM

## 2024-11-19 PROCEDURE — 2500000003 HC RX 250 WO HCPCS

## 2024-11-19 PROCEDURE — 3600000014 HC SURGERY LEVEL 4 ADDTL 15MIN: Performed by: ORTHOPAEDIC SURGERY

## 2024-11-19 PROCEDURE — 2580000003 HC RX 258: Performed by: NURSE ANESTHETIST, CERTIFIED REGISTERED

## 2024-11-19 PROCEDURE — C1713 ANCHOR/SCREW BN/BN,TIS/BN: HCPCS | Performed by: ORTHOPAEDIC SURGERY

## 2024-11-19 PROCEDURE — 2580000003 HC RX 258: Performed by: PHYSICIAN ASSISTANT

## 2024-11-19 PROCEDURE — C9290 INJ, BUPIVACAINE LIPOSOME: HCPCS | Performed by: NURSE ANESTHETIST, CERTIFIED REGISTERED

## 2024-11-19 PROCEDURE — 6360000002 HC RX W HCPCS: Performed by: NURSE ANESTHETIST, CERTIFIED REGISTERED

## 2024-11-19 PROCEDURE — 6360000002 HC RX W HCPCS: Performed by: PHYSICIAN ASSISTANT

## 2024-11-19 PROCEDURE — 6360000002 HC RX W HCPCS: Performed by: ANESTHESIOLOGY

## 2024-11-19 PROCEDURE — 3600000004 HC SURGERY LEVEL 4 BASE: Performed by: ORTHOPAEDIC SURGERY

## 2024-11-19 PROCEDURE — 2500000003 HC RX 250 WO HCPCS: Performed by: PHYSICIAN ASSISTANT

## 2024-11-19 PROCEDURE — 2500000003 HC RX 250 WO HCPCS: Performed by: ORTHOPAEDIC SURGERY

## 2024-11-19 PROCEDURE — 3700000001 HC ADD 15 MINUTES (ANESTHESIA): Performed by: ORTHOPAEDIC SURGERY

## 2024-11-19 PROCEDURE — C1763 CONN TISS, NON-HUMAN: HCPCS | Performed by: ORTHOPAEDIC SURGERY

## 2024-11-19 PROCEDURE — 2580000003 HC RX 258: Performed by: ANESTHESIOLOGY

## 2024-11-19 PROCEDURE — 7100000001 HC PACU RECOVERY - ADDTL 15 MIN: Performed by: ORTHOPAEDIC SURGERY

## 2024-11-19 PROCEDURE — 2709999900 HC NON-CHARGEABLE SUPPLY: Performed by: ORTHOPAEDIC SURGERY

## 2024-11-19 PROCEDURE — 7100000000 HC PACU RECOVERY - FIRST 15 MIN: Performed by: ORTHOPAEDIC SURGERY

## 2024-11-19 PROCEDURE — 2500000003 HC RX 250 WO HCPCS: Performed by: NURSE ANESTHETIST, CERTIFIED REGISTERED

## 2024-11-19 PROCEDURE — 64415 NJX AA&/STRD BRCH PLXS IMG: CPT | Performed by: ANESTHESIOLOGY

## 2024-11-19 PROCEDURE — 2720000010 HC SURG SUPPLY STERILE: Performed by: ORTHOPAEDIC SURGERY

## 2024-11-19 PROCEDURE — 3700000000 HC ANESTHESIA ATTENDED CARE: Performed by: ORTHOPAEDIC SURGERY

## 2024-11-19 PROCEDURE — 6360000002 HC RX W HCPCS: Performed by: ORTHOPAEDIC SURGERY

## 2024-11-19 PROCEDURE — 6360000002 HC RX W HCPCS

## 2024-11-19 DEVICE — IMPLANTABLE DEVICE
Type: IMPLANTABLE DEVICE | Site: SHOULDER | Status: FUNCTIONAL
Brand: BIOINDUCTIVE IMPLANT WITH ARTHROSCOPIC DELIVERY SYSTEM - LARGE

## 2024-11-19 DEVICE — ANCHOR TEND 8 FOR REGENETEN BIOINDUCTIVE IMPL SYS: Type: IMPLANTABLE DEVICE | Site: SHOULDER | Status: FUNCTIONAL

## 2024-11-19 DEVICE — ANCHOR SUT L15.8MM DIA3.5MM BIOCOMPOSITE SCR IN KNOTLESS: Type: IMPLANTABLE DEVICE | Site: SHOULDER | Status: FUNCTIONAL

## 2024-11-19 DEVICE — ANCHOR BIOCOMPOSITE KNOTLESS SL  4.75X19.1MM W/#2 BL SUTURE: Type: IMPLANTABLE DEVICE | Site: SHOULDER | Status: FUNCTIONAL

## 2024-11-19 DEVICE — HEALICOIL RG SA 5.5MM W/3 UB-BL CB                                    BL BK
Type: IMPLANTABLE DEVICE | Site: SHOULDER | Status: FUNCTIONAL
Brand: HEALICOIL / ULTRABRAID

## 2024-11-19 DEVICE — BONE ANCHORS 3 WITH ARTHROSCOPIC DELIVERY SYSTEM ADVANCED
Type: IMPLANTABLE DEVICE | Site: SHOULDER | Status: FUNCTIONAL
Brand: BONE ANCHORS WITH ARTHROSCOPIC DELIVERY SYSTEM - ADVANCED

## 2024-11-19 RX ORDER — SODIUM CHLORIDE 9 MG/ML
INJECTION, SOLUTION INTRAVENOUS PRN
Status: DISCONTINUED | OUTPATIENT
Start: 2024-11-19 | End: 2024-11-19 | Stop reason: HOSPADM

## 2024-11-19 RX ORDER — SODIUM CHLORIDE 0.9 % (FLUSH) 0.9 %
5-40 SYRINGE (ML) INJECTION EVERY 12 HOURS SCHEDULED
Status: DISCONTINUED | OUTPATIENT
Start: 2024-11-19 | End: 2024-11-19 | Stop reason: HOSPADM

## 2024-11-19 RX ORDER — FENTANYL CITRATE 50 UG/ML
INJECTION, SOLUTION INTRAMUSCULAR; INTRAVENOUS
Status: COMPLETED
Start: 2024-11-19 | End: 2024-11-19

## 2024-11-19 RX ORDER — HYDROMORPHONE HYDROCHLORIDE 1 MG/ML
0.5 INJECTION, SOLUTION INTRAMUSCULAR; INTRAVENOUS; SUBCUTANEOUS EVERY 5 MIN PRN
Status: DISCONTINUED | OUTPATIENT
Start: 2024-11-19 | End: 2024-11-19 | Stop reason: HOSPADM

## 2024-11-19 RX ORDER — OXYCODONE HYDROCHLORIDE 5 MG/1
5 TABLET ORAL
Status: DISCONTINUED | OUTPATIENT
Start: 2024-11-19 | End: 2024-11-19 | Stop reason: HOSPADM

## 2024-11-19 RX ORDER — FENTANYL CITRATE 50 UG/ML
INJECTION, SOLUTION INTRAMUSCULAR; INTRAVENOUS
Status: DISCONTINUED | OUTPATIENT
Start: 2024-11-19 | End: 2024-11-19 | Stop reason: SDUPTHER

## 2024-11-19 RX ORDER — DIPHENHYDRAMINE HYDROCHLORIDE 50 MG/ML
12.5 INJECTION INTRAMUSCULAR; INTRAVENOUS
Status: DISCONTINUED | OUTPATIENT
Start: 2024-11-19 | End: 2024-11-19 | Stop reason: HOSPADM

## 2024-11-19 RX ORDER — DEXAMETHASONE SODIUM PHOSPHATE 4 MG/ML
INJECTION, SOLUTION INTRA-ARTICULAR; INTRALESIONAL; INTRAMUSCULAR; INTRAVENOUS; SOFT TISSUE
Status: DISCONTINUED | OUTPATIENT
Start: 2024-11-19 | End: 2024-11-19 | Stop reason: SDUPTHER

## 2024-11-19 RX ORDER — LIDOCAINE HYDROCHLORIDE 20 MG/ML
INJECTION, SOLUTION INTRAVENOUS
Status: DISCONTINUED | OUTPATIENT
Start: 2024-11-19 | End: 2024-11-19 | Stop reason: SDUPTHER

## 2024-11-19 RX ORDER — GLYCOPYRROLATE 0.2 MG/ML
INJECTION INTRAMUSCULAR; INTRAVENOUS
Status: DISCONTINUED | OUTPATIENT
Start: 2024-11-19 | End: 2024-11-19 | Stop reason: SDUPTHER

## 2024-11-19 RX ORDER — PROPOFOL 10 MG/ML
INJECTION, EMULSION INTRAVENOUS
Status: DISCONTINUED | OUTPATIENT
Start: 2024-11-19 | End: 2024-11-19 | Stop reason: SDUPTHER

## 2024-11-19 RX ORDER — LORAZEPAM 2 MG/ML
0.5 INJECTION INTRAMUSCULAR
Status: DISCONTINUED | OUTPATIENT
Start: 2024-11-19 | End: 2024-11-19 | Stop reason: HOSPADM

## 2024-11-19 RX ORDER — CLINDAMYCIN PHOSPHATE 900 MG/50ML
900 INJECTION, SOLUTION INTRAVENOUS ONCE
Status: COMPLETED | OUTPATIENT
Start: 2024-11-19 | End: 2024-11-19

## 2024-11-19 RX ORDER — SENNOSIDES 8.6 MG
1 TABLET ORAL DAILY
Qty: 30 TABLET | Refills: 0 | Status: SHIPPED | OUTPATIENT
Start: 2024-11-19

## 2024-11-19 RX ORDER — BUPIVACAINE HYDROCHLORIDE 5 MG/ML
INJECTION, SOLUTION EPIDURAL; INTRACAUDAL
Status: COMPLETED | OUTPATIENT
Start: 2024-11-19 | End: 2024-11-19

## 2024-11-19 RX ORDER — MIDAZOLAM HYDROCHLORIDE 1 MG/ML
INJECTION, SOLUTION INTRAMUSCULAR; INTRAVENOUS
Status: DISCONTINUED | OUTPATIENT
Start: 2024-11-19 | End: 2024-11-19 | Stop reason: SDUPTHER

## 2024-11-19 RX ORDER — ROCURONIUM BROMIDE 10 MG/ML
INJECTION, SOLUTION INTRAVENOUS
Status: DISCONTINUED | OUTPATIENT
Start: 2024-11-19 | End: 2024-11-19 | Stop reason: SDUPTHER

## 2024-11-19 RX ORDER — SODIUM CHLORIDE 0.9 % (FLUSH) 0.9 %
5-40 SYRINGE (ML) INJECTION PRN
Status: DISCONTINUED | OUTPATIENT
Start: 2024-11-19 | End: 2024-11-19 | Stop reason: HOSPADM

## 2024-11-19 RX ORDER — BUPIVACAINE HYDROCHLORIDE 5 MG/ML
INJECTION, SOLUTION EPIDURAL; INTRACAUDAL
Status: DISCONTINUED | OUTPATIENT
Start: 2024-11-19 | End: 2024-11-19

## 2024-11-19 RX ORDER — BUPIVACAINE HYDROCHLORIDE 5 MG/ML
INJECTION, SOLUTION EPIDURAL; INTRACAUDAL PRN
Status: DISCONTINUED | OUTPATIENT
Start: 2024-11-19 | End: 2024-11-19 | Stop reason: HOSPADM

## 2024-11-19 RX ORDER — HYDROMORPHONE HYDROCHLORIDE 2 MG/ML
INJECTION, SOLUTION INTRAMUSCULAR; INTRAVENOUS; SUBCUTANEOUS
Status: DISCONTINUED | OUTPATIENT
Start: 2024-11-19 | End: 2024-11-19 | Stop reason: SDUPTHER

## 2024-11-19 RX ORDER — IPRATROPIUM BROMIDE AND ALBUTEROL SULFATE 2.5; .5 MG/3ML; MG/3ML
1 SOLUTION RESPIRATORY (INHALATION)
Status: DISCONTINUED | OUTPATIENT
Start: 2024-11-19 | End: 2024-11-19 | Stop reason: HOSPADM

## 2024-11-19 RX ORDER — MIDAZOLAM HYDROCHLORIDE 1 MG/ML
INJECTION, SOLUTION INTRAMUSCULAR; INTRAVENOUS
Status: COMPLETED
Start: 2024-11-19 | End: 2024-11-19

## 2024-11-19 RX ORDER — LABETALOL HYDROCHLORIDE 5 MG/ML
10 INJECTION, SOLUTION INTRAVENOUS
Status: DISCONTINUED | OUTPATIENT
Start: 2024-11-19 | End: 2024-11-19 | Stop reason: HOSPADM

## 2024-11-19 RX ORDER — NALOXONE HYDROCHLORIDE 0.4 MG/ML
INJECTION, SOLUTION INTRAMUSCULAR; INTRAVENOUS; SUBCUTANEOUS PRN
Status: DISCONTINUED | OUTPATIENT
Start: 2024-11-19 | End: 2024-11-19 | Stop reason: HOSPADM

## 2024-11-19 RX ORDER — SODIUM CHLORIDE 9 MG/ML
INJECTION, SOLUTION INTRAVENOUS
Status: DISCONTINUED | OUTPATIENT
Start: 2024-11-19 | End: 2024-11-19 | Stop reason: SDUPTHER

## 2024-11-19 RX ORDER — SODIUM CHLORIDE, SODIUM LACTATE, POTASSIUM CHLORIDE, CALCIUM CHLORIDE 600; 310; 30; 20 MG/100ML; MG/100ML; MG/100ML; MG/100ML
INJECTION, SOLUTION INTRAVENOUS CONTINUOUS
Status: DISCONTINUED | OUTPATIENT
Start: 2024-11-19 | End: 2024-11-19 | Stop reason: HOSPADM

## 2024-11-19 RX ORDER — FENTANYL CITRATE 50 UG/ML
25 INJECTION, SOLUTION INTRAMUSCULAR; INTRAVENOUS EVERY 5 MIN PRN
Status: DISCONTINUED | OUTPATIENT
Start: 2024-11-19 | End: 2024-11-19 | Stop reason: HOSPADM

## 2024-11-19 RX ORDER — SUCCINYLCHOLINE/SOD CL,ISO/PF 200MG/10ML
SYRINGE (ML) INTRAVENOUS
Status: DISCONTINUED | OUTPATIENT
Start: 2024-11-19 | End: 2024-11-19 | Stop reason: SDUPTHER

## 2024-11-19 RX ORDER — LIDOCAINE HYDROCHLORIDE 10 MG/ML
1 INJECTION, SOLUTION EPIDURAL; INFILTRATION; INTRACAUDAL; PERINEURAL
Status: DISCONTINUED | OUTPATIENT
Start: 2024-11-19 | End: 2024-11-19 | Stop reason: HOSPADM

## 2024-11-19 RX ORDER — ONDANSETRON 2 MG/ML
4 INJECTION INTRAMUSCULAR; INTRAVENOUS
Status: COMPLETED | OUTPATIENT
Start: 2024-11-19 | End: 2024-11-19

## 2024-11-19 RX ORDER — ONDANSETRON 4 MG/1
4 TABLET, FILM COATED ORAL 3 TIMES DAILY PRN
Qty: 15 TABLET | Refills: 0 | Status: SHIPPED | OUTPATIENT
Start: 2024-11-19

## 2024-11-19 RX ORDER — ASPIRIN 325 MG
325 TABLET ORAL 2 TIMES DAILY
Qty: 28 TABLET | Refills: 0 | Status: SHIPPED | OUTPATIENT
Start: 2024-11-19 | End: 2024-12-03

## 2024-11-19 RX ORDER — OXYCODONE AND ACETAMINOPHEN 5; 325 MG/1; MG/1
1 TABLET ORAL
Qty: 28 TABLET | Refills: 0 | Status: SHIPPED | OUTPATIENT
Start: 2024-11-19 | End: 2024-11-26

## 2024-11-19 RX ORDER — ONDANSETRON 2 MG/ML
INJECTION INTRAMUSCULAR; INTRAVENOUS
Status: DISCONTINUED | OUTPATIENT
Start: 2024-11-19 | End: 2024-11-19 | Stop reason: SDUPTHER

## 2024-11-19 RX ORDER — EPHEDRINE SULFATE 50 MG/ML
INJECTION INTRAVENOUS
Status: DISCONTINUED | OUTPATIENT
Start: 2024-11-19 | End: 2024-11-19 | Stop reason: SDUPTHER

## 2024-11-19 RX ADMIN — HYDROMORPHONE HYDROCHLORIDE 0.5 MG: 2 INJECTION, SOLUTION INTRAMUSCULAR; INTRAVENOUS; SUBCUTANEOUS at 17:01

## 2024-11-19 RX ADMIN — ROCURONIUM BROMIDE 30 MG: 10 INJECTION, SOLUTION INTRAVENOUS at 16:09

## 2024-11-19 RX ADMIN — DEXMEDETOMIDINE HYDROCHLORIDE 5 MCG: 100 INJECTION, SOLUTION INTRAVENOUS at 14:43

## 2024-11-19 RX ADMIN — MIDAZOLAM HYDROCHLORIDE 2 MG: 2 INJECTION, SOLUTION INTRAMUSCULAR; INTRAVENOUS at 12:55

## 2024-11-19 RX ADMIN — Medication 100 MG: at 14:46

## 2024-11-19 RX ADMIN — SODIUM CHLORIDE: 9 INJECTION, SOLUTION INTRAVENOUS at 16:10

## 2024-11-19 RX ADMIN — DEXMEDETOMIDINE HYDROCHLORIDE 5 MCG: 100 INJECTION, SOLUTION INTRAVENOUS at 14:36

## 2024-11-19 RX ADMIN — CLINDAMYCIN PHOSPHATE 900 MG: 900 INJECTION, SOLUTION INTRAVENOUS at 14:57

## 2024-11-19 RX ADMIN — ROCURONIUM BROMIDE 50 MG: 10 INJECTION, SOLUTION INTRAVENOUS at 14:57

## 2024-11-19 RX ADMIN — PHENYLEPHRINE HYDROCHLORIDE 100 MCG: 10 INJECTION, SOLUTION INTRAVENOUS at 16:38

## 2024-11-19 RX ADMIN — FENTANYL CITRATE 100 MCG: 50 INJECTION, SOLUTION INTRAMUSCULAR; INTRAVENOUS at 12:55

## 2024-11-19 RX ADMIN — SODIUM CHLORIDE, POTASSIUM CHLORIDE, SODIUM LACTATE AND CALCIUM CHLORIDE: 600; 310; 30; 20 INJECTION, SOLUTION INTRAVENOUS at 12:57

## 2024-11-19 RX ADMIN — EPHEDRINE SULFATE 5 MG: 50 INJECTION INTRAVENOUS at 15:41

## 2024-11-19 RX ADMIN — EPHEDRINE SULFATE 5 MG: 50 INJECTION INTRAVENOUS at 15:44

## 2024-11-19 RX ADMIN — ROCURONIUM BROMIDE 20 MG: 10 INJECTION, SOLUTION INTRAVENOUS at 17:11

## 2024-11-19 RX ADMIN — PROPOFOL 120 MG: 10 INJECTION, EMULSION INTRAVENOUS at 14:45

## 2024-11-19 RX ADMIN — BUPIVACAINE 10 ML: 13.3 INJECTION, SUSPENSION, LIPOSOMAL INFILTRATION at 12:53

## 2024-11-19 RX ADMIN — GLYCOPYRROLATE 0.2 MG: 0.2 INJECTION INTRAMUSCULAR; INTRAVENOUS at 14:43

## 2024-11-19 RX ADMIN — BUPIVACAINE HYDROCHLORIDE 10 ML: 5 INJECTION, SOLUTION EPIDURAL; INTRACAUDAL; PERINEURAL at 12:52

## 2024-11-19 RX ADMIN — DEXAMETHASONE SODIUM PHOSPHATE 4 MG: 4 INJECTION INTRA-ARTICULAR; INTRALESIONAL; INTRAMUSCULAR; INTRAVENOUS; SOFT TISSUE at 14:57

## 2024-11-19 RX ADMIN — SODIUM CHLORIDE: 9 INJECTION, SOLUTION INTRAVENOUS at 11:40

## 2024-11-19 RX ADMIN — ROCURONIUM BROMIDE 20 MG: 10 INJECTION, SOLUTION INTRAVENOUS at 16:57

## 2024-11-19 RX ADMIN — ONDANSETRON 4 MG: 2 INJECTION INTRAMUSCULAR; INTRAVENOUS at 14:56

## 2024-11-19 RX ADMIN — PHENYLEPHRINE HYDROCHLORIDE 100 MCG: 10 INJECTION, SOLUTION INTRAVENOUS at 15:01

## 2024-11-19 RX ADMIN — TRANEXAMIC ACID 1000 MG: 100 INJECTION, SOLUTION INTRAVENOUS at 14:55

## 2024-11-19 RX ADMIN — EPHEDRINE SULFATE 10 MG: 50 INJECTION INTRAVENOUS at 15:19

## 2024-11-19 RX ADMIN — ONDANSETRON 4 MG: 2 INJECTION INTRAMUSCULAR; INTRAVENOUS at 19:23

## 2024-11-19 RX ADMIN — LIDOCAINE HYDROCHLORIDE 50 MG: 20 INJECTION, SOLUTION INTRAVENOUS at 14:43

## 2024-11-19 ASSESSMENT — PAIN SCALES - GENERAL
PAINLEVEL_OUTOF10: 0

## 2024-11-19 ASSESSMENT — PAIN - FUNCTIONAL ASSESSMENT
PAIN_FUNCTIONAL_ASSESSMENT: ACTIVITIES ARE NOT PREVENTED
PAIN_FUNCTIONAL_ASSESSMENT: 0-10

## 2024-11-19 NOTE — PROGRESS NOTES
Procedure:  Brachial plexus Interscalene  MD: Dr. Ponce /Liban QUINTANILLA  Timeout performed.  Pt monitored closely on heart monitor, 2L NC, continuous pulse oximetry, EtCO2, and frequent BPs.   Pt remained alert and oriented x4. pt tolerated procedure well.

## 2024-11-19 NOTE — ANESTHESIA PROCEDURE NOTES
the same way.     Anterior scalene and middle scalene muscles, upper, middle and lower trunks of the brachial plexus are identified, the tip of the needle and the spread of the local anesthetic around the brachial plexus are visualized. The Brachial plexus appeared to be anatomically normal and there were no abnormal pathologically findings seen.     <0.5ml EBL.      Medications Administered  BUPivacaine (MARCAINE) PF injection 0.5% - Perineural, Arm Right   10 mL - 11/19/2024 12:52:00 PM  BUPivacaine liposome (EXPAREL) injection 1.3% - Perineural   10 mL - 11/19/2024 12:53:00 PM

## 2024-11-19 NOTE — DISCHARGE INSTRUCTIONS
repeated episodes of vomiting which persist beyond 12-24 hours, notify your physician.  Once nausea has passed, remember to keep drinking fluids.    Difficulty Passing Urine  [x]Drink extra amounts of fluid today.  Notify your physician if you have not urinated within 8 hours after your procedure or you feel uncomfortable.      Irritated Throat from a Breathing Tube  [x]Drink extra amounts of fluid today.  Lozenges may help.    Muscle Aches  [x]You may experience some generalized body aches as your muscles recover from medications used to relax them during surgery.  These will gradually subside.    MEDICATION INSTRUCTIONS:  []Prescription(S) x     sent with you.  Use as directed.  When taking pain medications, you may experience the side effect of dizziness or drowsiness.  Do not drink alcohol or drive when taking these medications.  [x]Prescription(S) x     4    Called to Pharmacy Name and location: [facility]    [x]Give the list of your medications to your primary care physician on your next visit. Keep your med list updated and carry it with in case of emergencies.    [x] Narcotic pain medications can cause the side effect of significant constipation.  You may want to add a stool softener to your postoperative medication schedule or speak to your surgeon on how best to manage this side effect.    NARCOTIC SAFETY:  Your pain medicine is only for you to take.  Safely store your medicines.  Store pills up high and out of reach of children and pets.  Ensure safety caps are snapped tightly  Keep track of how many pills you have left    Unused medication can be disposed of by taking them to a drop-off box or take-back program that is authorized by the [employer].  Access to a site near you can be found on the [employer]'s [employer] website (deadiversion.usdoj.gov).    If you have a CPAP machine, it is very important that you use it daily during all periods of sleep and daytime rest during your recovery at home.  Surgery

## 2024-11-19 NOTE — BRIEF OP NOTE
Brief Postoperative Note      Patient: Mary Nascimento  YOB: 1965  MRN: [id number]    Date of Procedure: 11/19/2024    Pre-Op Diagnosis Codes:      * Strain of tendon of right rotator cuff [S46.011A]  Rotator cuff tear, right shoulder    Post-Op Diagnosis: Same       Procedure(s):  RIGHT SHOULDER DIAGNOSTIC ARTHROSCOPY, EXAMINATION UNDER ANESTHESIA, ARTHROSCOPIC EXTENSIVE DEBRIDEMENT, LYSIS OF ADHESIONS, SUBACROMIAL DECOMPRESSION, ROTATOR CUFF REPAIR WITH PATCH AUGMENTATION AND OPEN BICEPS TENODESIS    Surgeon(s):  Oscar Alonso MD    Assistant:  Surgical Assistant: Fabricio Manley RN  Fellow: Rani Sebastian MD  Resident: Jasmina Calderón DO    Anesthesia: General    Estimated Blood Loss (mL): less than 100     Complications: None    Specimens:   * No specimens in log *    Implants:  * No implants in log *      Drains: * No LDAs found *    Findings:  Infection Present At Time Of Surgery (PATOS) (choose all levels that have infection present):  No infection present  Other Findings: None    Electronically signed by Rani Sebastian MD on 11/19/2024 at 5:20 PM

## 2024-11-19 NOTE — INTERVAL H&P NOTE
Update History & Physical    The patient's History and Physical of November 12, 2024 was reviewed with the patient and I examined the patient. There was no change. The surgical site was confirmed by the patient and me.     Plan: The risks, benefits, expected outcome, and alternative to the recommended procedure have been discussed with the patient. Patient understands and wants to proceed with the procedure.     Electronically signed by Rani Sebastian MD on 11/19/2024 at 1:32 PM

## 2024-11-19 NOTE — ANESTHESIA POSTPROCEDURE EVALUATION
Department of Anesthesiology  Postprocedure Note    Patient: Mary Nascimento  MRN: [id number]  YOB: 1965  Date of evaluation: 11/19/2024    Procedure Summary       Date: 11/19/24 Room / Location: [address]    Anesthesia Start: 1443 Anesthesia Stop: 1802    Procedure: RIGHT SHOULDER DIAGNOSTIC ARTHROSCOPY, EXAMINATION UNDER ANESTHESIA, ARTHROSCOPIC EXTENSIVE DEBRIDEMENT, DECOMPRESSION, ROTATOR CUFF REPAIR WITH PATCH AUGMENTATION AND OPEN BICEPS TENODESIS (Right: Shoulder) Diagnosis:       Strain of tendon of right rotator cuff      (Strain of tendon of right rotator cuff [S46.011A])    Surgeons: Oscar Alonso MD Responsible Provider: Mikey Snow MD    Anesthesia Type: general, regional ASA Status: 2            Anesthesia Type: No value filed.    Nicole Phase I: Nicole Score: 10    Nicole Phase II:      Anesthesia Post Evaluation    Patient location during evaluation: PACU  Patient participation: complete - patient participated  Level of consciousness: awake  Airway patency: patent  Nausea & Vomiting: no nausea and no vomiting  Cardiovascular status: blood pressure returned to baseline and hemodynamically stable  Respiratory status: acceptable  Hydration status: euvolemic  Multimodal analgesia pain management approach  Pain management: adequate    No notable events documented.

## 2024-11-19 NOTE — ANESTHESIA PRE PROCEDURE
PA-C        sodium chloride flush 0.9 % injection 5-40 mL  5-40 mL IntraVENous 2 times per day Nagar, Sushma, PA-C        sodium chloride flush 0.9 % injection 5-40 mL  5-40 mL IntraVENous PRN Nagar, Sushma, PA-C        0.9 % sodium chloride infusion   IntraVENous PRN Nagar, Sushma, PA-ROBERT        clindamycin (CLEOCIN) 900 mg in dextrose 5 % 50 mL IVPB  900 mg IntraVENous Once Nagar, Sushma, PA-ROBERT        lidocaine PF 1 % injection 1 mL  1 mL IntraDERmal Once PRN Garret Perez, DO        lactated ringers infusion   IntraVENous Continuous Garret Perez, DO        sodium chloride flush 0.9 % injection 5-40 mL  5-40 mL IntraVENous 2 times per day Garret Perez, DO        sodium chloride flush 0.9 % injection 5-40 mL  5-40 mL IntraVENous PRN Garret Perez, DO         Facility-Administered Medications Ordered in Other Encounters   Medication Dose Route Frequency Provider Last Rate Last Admin    0.9 % sodium chloride infusion   IntraVENous Continuous PRN Jordan Monroe APRN - CRNA   New Bag at 24 1140     Vital Signs (Current)   Vitals:    24 1545 24 1200   BP:  137/86   Pulse:  57   Resp:  18   Temp:  37.2 °C (99 °F)   TempSrc:  Temporal   SpO2:  100%   Weight: 50.8 kg (112 lb) 52.2 kg (115 lb)   Height: 1.6 m (5' 3\") 1.6 m (5' 2.99\")                                            Vital Signs Statistics (for past 48 hrs)     Temp  Av.2 °C (99 °F)  Min: 37.2 °C (99 °F)   Min taken time: 24 1200  Max: 37.2 °C (99 °F)   Max taken time: 24 1200  Pulse  Av  Min: 57   Min taken time: 24 1200  Max: 57   Max taken time: 24 1200  Resp  Av  Min: 18   Min taken time: 24 1200  Max: 18   Max taken time: 24 1200  BP  Min: 137/86   Min taken time: 24 1200  Max: 137/86   Max taken time: 24 1200  MAP (mmHg)  Av  Min: 100   Min taken time: 24 1200  Max: 100   Max taken time: 24  SpO2  Av %  Min: 100 %   Min

## 2024-11-20 ENCOUNTER — TELEPHONE (OUTPATIENT)
Dept: ORTHOPEDIC SURGERY | Age: 59
End: 2024-11-20

## 2024-11-20 NOTE — TELEPHONE ENCOUNTER
CALL TRANSFERRED    S/p RIGHT SHOULDER DIAGNOSTIC ARTHROSCOPY, EXAMINATION UNDER ANESTHESIA, ARTHROSCOPIC EXTENSIVE DEBRIDEMENT, DECOMPRESSION, ROTATOR CUFF REPAIR WITH PATCH AUGMENTATION AND OPEN BICEPS TENODESIS YESTERDAY.    PER PATIENT'S , SHE FEELS LIKE A TOURNIQUETS IS SQUEEZING HER SHOULDER.  RT HAND HAS SWELLING, NO RASH OR REDNESS. IS IN A LOT OF PAIN, MEDICATION DOES NOT SEEM TO BE HELPING. HAVING A DIFFICULT TIME KEEPING ARM CLOSE TO HER BODY.    REACHING OUT TO CLINIC

## 2024-11-20 NOTE — OP NOTE
realized that we had very good fixation with just that eyelet which was acting as an intramedullary anchor, so we simply chose a smaller anchor which was a 3.5 BioComposite anchor and fixed it to get an interference fit.  Multiple suture tails were then passed back through the tendon and tied them in complimentary strands to reinforce the repair.  Tails were cut short.  We had really good tension on the repair, but again the biceps was not perfectly cosmetic.  There was still a little bit of swelling at the site of the scarring and also the biceps was a little bit dropped relative to the contralateral side but clearly improved by about 2 or 3 inches from where it sat before.  We irrigated copiously, closed the biceps wound in layers.  We used 3-0 Vicryl interrupted inverted fashion to close the subcutaneous tissue.  Routine skin closure with 4-0 Monocryl and Dermabond to close the incisions as well as the 6 arthroscopic portals corresponding to the anterior, lateral, posterior as well as accessory anterolateral, posterolateral, and low anterolateral portals; these were closed routinely.  Sterile compressive dressing applied.  The arm was placed in a padded soft brace.  We did infiltrate with 0.5% Marcaine into the biceps incision.  The arm was placed in a well-padded soft brace.  The patient was repositioned in the supine position before this and promptly awakened from anesthesia having tolerated the procedure well and taken from the operating room to the recovery room in satisfactory condition.    PLAN:  The patient will be discharged on oral analgesics with instructions to begin outpatient physical therapy.  She will follow up in the office in 1 week.  She will be in a delayed rehab program.          VIOLET MALDONADO MD      D:  11/19/2024 18:19:26     T:  11/20/2024 01:11:57     GITA/MARIVEL  Job #:  [id number]     Doc#:  [id number]

## 2024-11-20 NOTE — PROGRESS NOTES
Vitals:    11/19/24 1930   BP:    Pulse: 57   Resp: 16   Temp: 97.6 °F (36.4 °C)   SpO2: 97%         Intake/Output Summary (Last 24 hours) at 11/19/2024 1934  Last data filed at 11/19/2024 1609  Gross per 24 hour   Intake 1000 ml   Output --   Net 1000 ml       Pain assessment:  none  Pain Level: 0    Pt awake, comfortable. Denies pain/nausea. VSS. Right shoulder drsg d/I. Shoulder immobilizer in place. Able to wiggle fingers, strong radial pulse. Numbness present secondary to interscalene block. Discharge instructions reviewed w/. Pt dressed and taken to discharge area per w/c.     11/19/2024 7:34 PM

## 2024-11-20 NOTE — TELEPHONE ENCOUNTER
General Question     Subject: RT SHOULDER  Patient and /or Facility Request: Mary Nascimento   Contact Number: [phone]     DAUGHTER/ WERE ON THE PHONE STATING THAT PATIENT HAD RT SHOULDER SURGERY YESTERDAY.    SHE FEELS LIKE A TOURNIQUETS IS SQUEEZING HER SHOULDER.    RAYMOND MELO TOOK CALL.

## 2024-11-21 ENCOUNTER — TELEPHONE (OUTPATIENT)
Dept: ORTHOPEDIC SURGERY | Age: 59
End: 2024-11-21

## 2024-11-21 NOTE — TELEPHONE ENCOUNTER
General Question     Subject: INQUIRING IF SHE SHOULD TAKE THE BANDAGES OFF WHEN SHE SHOWERS.  Patient and /or Facility Request: Mary Nascimento   Contact Number: [phone]

## 2024-11-25 ENCOUNTER — OFFICE VISIT (OUTPATIENT)
Dept: ORTHOPEDIC SURGERY | Age: 59
End: 2024-11-25

## 2024-11-25 VITALS — HEIGHT: 63 IN | BODY MASS INDEX: 20.38 KG/M2 | WEIGHT: 115 LBS

## 2024-11-25 DIAGNOSIS — Z96.652 STATUS POST LEFT PARTIAL KNEE REPLACEMENT: Primary | ICD-10-CM

## 2024-11-25 PROCEDURE — 99024 POSTOP FOLLOW-UP VISIT: CPT | Performed by: PHYSICIAN ASSISTANT

## 2024-11-25 NOTE — PROGRESS NOTES
History of Present Illness:  Mary Nascimento is a 59 y.o. female who presents for a post operative visit.  The patient underwent a right arthroscopic repair of 2 tendon rotator cuff tear and open subpectoral biceps tenodesis along with microfracture and chondroplasty of an isolated humeral defect on 11/19/2024 by Dr. Oscar Alonso.  Overall she is doing okay and feels that their pain is well controlled with current pain medications.  She has been compliant with wearing the UltraSling brace at all times.    The patient deny fevers, chills, numbness, tingling, and shortness of breath.    Medical History:  Patient's medications, allergies, past medical, surgical, social and family histories were reviewed and updated as appropriate.    No notes on file    Review of Systems  A 14 point review of systems was completed by the patient is available in the media section of the scanned medical record and was reviewed on 11/25/2024.      Vital Signs:  There were no vitals filed for this visit.    General/Appearance: Alert and oriented and in no apparent distress.    Skin:  There are no skin lesions, cellulitis, or extreme edema. The patient has warm and well-perfused Bilateral upper extremities with brisk capillary refill.      right Shoulder Exam:    Inspection: right shoulder incision that is clean, dry and intact and well approximated.  The Prineo dressing is still in place.  Mild ecchymosis and swelling are present as can be expected. There is no erythema, drainage or other signs of infection    Palpation:  No crepitus to gentle motion    Active Range of Motion: Deferred    Passive Range of Motion:  tolerates pendulum movements.     Strength:  Deferred    Special Tests:  Deferred.    Neurovascular: Sensation to light touch is intact, no motor deficits, palpable radial pulses 2+    Radiology:     Plain radiographs not obtained.          Assessment :  Ms. Mary Nascimento is a 59 y.o. patient underwent a right arthroscopic

## 2024-12-02 ENCOUNTER — HOSPITAL ENCOUNTER (OUTPATIENT)
Dept: PHYSICAL THERAPY | Age: 59
Setting detail: THERAPIES SERIES
Discharge: HOME | End: 2024-12-02
Payer: COMMERCIAL

## 2024-12-04 ENCOUNTER — HOSPITAL ENCOUNTER (OUTPATIENT)
Dept: PHYSICAL THERAPY | Age: 59
Setting detail: THERAPIES SERIES
Discharge: HOME | End: 2024-12-04
Payer: COMMERCIAL

## 2024-12-04 DIAGNOSIS — R53.1 WEAKNESS: ICD-10-CM

## 2024-12-04 DIAGNOSIS — M25.511 ACUTE PAIN OF RIGHT SHOULDER: Primary | ICD-10-CM

## 2024-12-04 DIAGNOSIS — M25.611 DECREASED ROM OF RIGHT SHOULDER: ICD-10-CM

## 2024-12-04 DIAGNOSIS — M25.511 ACUTE POSTOPERATIVE PAIN OF RIGHT SHOULDER: ICD-10-CM

## 2024-12-04 DIAGNOSIS — G89.18 ACUTE POSTOPERATIVE PAIN OF RIGHT SHOULDER: ICD-10-CM

## 2024-12-04 PROCEDURE — 97161 PT EVAL LOW COMPLEX 20 MIN: CPT

## 2024-12-04 PROCEDURE — 97110 THERAPEUTIC EXERCISES: CPT

## 2024-12-04 NOTE — PLAN OF CARE
[facility] [address], [address], [place] [address] office: [phone] fax: [phone]     Physical Therapy Initial Evaluation Certification      Dear Sushma Nagar, PA-C ,    We had the pleasure of evaluating the following patient for physical therapy services at [facility].  A summary of our findings can be found in the initial assessment below.  This includes our plan of care.  If you have any questions or concerns regarding these findings, please do not hesitate to contact me at the office phone number listed above.  Thank you for the referral.     Physician Signature:_______________________________Date:__________________  By signing above (or electronic signature), therapist’s plan is approved by physician       Physical Therapy: TREATMENT/PROGRESS NOTE   Patient: Mary Nascimento (59 y.o. female)   Examination Date: 2024   :  1965 MRN: [id number]   Visit #: 1   Insurance Allowable Auth Needed   BMN [x]Yes    []No    Insurance: Payor: [employer] / Plan: [employer] - CHOICE PLUS / Product Type: *No Product type* /   Insurance ID: [id number] - (Commercial)  Secondary Insurance (if applicable):    Treatment Diagnosis:     ICD-10-CM    1. Acute pain of right shoulder  M25.511       2. Decreased ROM of right shoulder  M25.611       3. Weakness  R53.1       4. Acute postoperative pain of right shoulder  G89.18     M25.511          Medical Diagnosis:  No admission diagnoses are documented for this encounter.   Referring Physician: Nagar, Sushma, PA-C  PCP: Kimberley Marlow MD     Plan of care signed (Y/N):     Date of Patient follow up with Physician: 24     Plan of Care Report: MAGDA osei  POC update due: (10 visits /OR AUTH LIMITS, whichever is less)  1/3/2025                                             Medical History:  Comorbidities:  Hypertension  Other Musculoskeletal Conditions:

## 2024-12-09 ENCOUNTER — HOSPITAL ENCOUNTER (OUTPATIENT)
Dept: PHYSICAL THERAPY | Age: 59
Setting detail: THERAPIES SERIES
Discharge: HOME | End: 2024-12-09
Payer: COMMERCIAL

## 2024-12-09 ENCOUNTER — OFFICE VISIT (OUTPATIENT)
Dept: ORTHOPEDIC SURGERY | Age: 59
End: 2024-12-09

## 2024-12-09 ENCOUNTER — TELEPHONE (OUTPATIENT)
Dept: ORTHOPEDIC SURGERY | Age: 59
End: 2024-12-09

## 2024-12-09 VITALS — HEIGHT: 63 IN | BODY MASS INDEX: 20.38 KG/M2 | WEIGHT: 115 LBS

## 2024-12-09 DIAGNOSIS — S46.211A BICEPS RUPTURE, PROXIMAL, RIGHT, INITIAL ENCOUNTER: ICD-10-CM

## 2024-12-09 DIAGNOSIS — Z98.890 S/P ROTATOR CUFF REPAIR: Primary | ICD-10-CM

## 2024-12-09 PROCEDURE — 99024 POSTOP FOLLOW-UP VISIT: CPT | Performed by: PHYSICIAN ASSISTANT

## 2024-12-09 PROCEDURE — 97110 THERAPEUTIC EXERCISES: CPT

## 2024-12-09 PROCEDURE — 97140 MANUAL THERAPY 1/> REGIONS: CPT

## 2024-12-09 NOTE — PROGRESS NOTES
[facility]  History and Physical  Shoulder Pain    Date:  2024    Name:  Mary Nascimento  Address:  [address] [place] [id number]    :  1965      Age:   59 y.o.    SSN:  [id number]      Medical Record Number:  [id number]    Reason for Visit:    Post-Op Check (PO Rt shoulder)      HPI:   Mary Nascimento is a 59 y.o. female who presents to our office today for follow up of the right shoulder pain.  She is 3 weeks status post from a right arthroscopic rotator cuff repair and open subpectoral biceps tenodesis.  Surgery was on 2024.  Patient did drive herself to the office today.  Patient reports she is doing better today than she was the first week after surgery.  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  She is currently on FMLA.  She is going to physical therapy once a week right now.  Patient reports she has been compliant with her sling.    No notes on file    Review of Systems:  A 14 point review of systems available in the scanned medical record as documented by the patient and reviewed on 2024.  The review is negative with the exception of those things mentioned in the History of Present Illness and Past Medical History.      Past Medical History:  Patient's medications, allergies, past medical, surgical, social and family histories were reviewed and updated as appropriate.    Allergies:  Allergies   Allergen Reactions    Phenergan [Promethazine Hcl] Seizure    Codeine Hives    Penicillins Hives     Can tolerate amoxicillin    Sulfa Antibiotics Rash    Sulfamethoxazole-Trimethoprim Hives    Diclofenac Rash       Physical Exam:  There were no vitals filed for this visit.  General: Mary Nascimento is a healthy and well appearing 59 y.o. female who is sitting comfortably in our office in acute distress.     Skin:  There are no skin lesions, cellulitis, or extreme  no

## 2024-12-09 NOTE — FLOWSHEET NOTE
consistent with post-surgical status including decreased ROM, strength and function.          Barriers to/and or personal factors that will affect rehab potential:   None noted    Physical Therapy Evaluation Complexity Justification  [x] A history of present problem and no personal factors and/or co-morbidities that impact the plan of care  [x] A total of 3 elements found upon examination of body systems using standardized tests and measures addressing any of the following: body structures, functions (impairments), activity limitations, and/or participation restrictions  [x] A clinical presentation with stable and/or uncomplicated characteristics   [x] Clinical decision making of LOW (81593 - Typically 20 minutes face-to-face) complexity using standardized patient assessment instrument and/or measurable assessment of functional outcome.    Today's Assessment: During therapy this date, patient required visual cueing, verbal cueing, tactile cueing, muscle facilitation, progression of exercises and program, and manual interventions for exercise progression, improving proper muscle recruitment and activation/motor control patterns, increasing ROM, and improving postural awareness.Patient will continue to benefit from ongoing evaluation and advanced clinical decision from a Physical Therapist to address and improve ROM, muscle strength, neuromuscular control, and proper body mechanics to safely return to PLOF without symptoms or restrictions.PROM of KAREN canela on track per protocol.    Medical Necessity Documentation:  I certify that this patient meets the below criteria necessary for medical necessity for care and/or justification of therapy services:  The patient has functional impairments and/or activity limitations and would benefit from continued outpatient therapy services to address the deficits outlined in the patients goals    Return to Play: NA    Prognosis for POC: [x] Good [] Fair  [] Poor    Patient requires

## 2024-12-09 NOTE — TELEPHONE ENCOUNTER
Faxed updated [facility] to [phone]

## 2024-12-16 ENCOUNTER — HOSPITAL ENCOUNTER (OUTPATIENT)
Dept: PHYSICAL THERAPY | Age: 59
Setting detail: THERAPIES SERIES
End: 2024-12-16
Payer: COMMERCIAL

## 2024-12-31 ENCOUNTER — OFFICE VISIT (OUTPATIENT)
Dept: ORTHOPEDIC SURGERY | Age: 59
End: 2024-12-31

## 2024-12-31 DIAGNOSIS — S46.211A BICEPS RUPTURE, PROXIMAL, RIGHT, INITIAL ENCOUNTER: ICD-10-CM

## 2024-12-31 DIAGNOSIS — Z98.890 S/P ROTATOR CUFF REPAIR: Primary | ICD-10-CM

## 2024-12-31 PROCEDURE — 99024 POSTOP FOLLOW-UP VISIT: CPT | Performed by: PHYSICIAN ASSISTANT

## 2024-12-31 NOTE — PROGRESS NOTES
[facility]  History and Physical  Shoulder Pain    Date:  2024    Name:  Mary Nascimento  Address:  [address] [place] [id number]    :  1965      Age:   59 y.o.    SSN:  [id number]      Medical Record Number:  [id number]    Reason for Visit:    Post-Op Check (Right shoulder. S/p rcr )      HPI:   Mary Nascimento is a 59 y.o. female who presents to our office today for follow up of the right shoulder pain.  She is 6 weeks status post from a right arthroscopic rotator cuff repair and open subpectoral biceps tenodesis.  Surgery was on 2024.  Patient did drive herself to the office today.  Patient reports she is doing better in her recovery but does realize that this is going to be longer than she had anticipated.  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  The patient is currently using FMLA to be off of work.   She denies any new injuries or setbacks since her last visit with us.  No notes on file    Review of Systems:  A 14 point review of systems available in the scanned medical record as documented by the patient and reviewed on 2024.  The review is negative with the exception of those things mentioned in the History of Present Illness and Past Medical History.      Past Medical History:  Patient's medications, allergies, past medical, surgical, social and family histories were reviewed and updated as appropriate.    Allergies:  Allergies   Allergen Reactions    Phenergan [Promethazine Hcl] Seizure    Codeine Hives    Penicillins Hives     Can tolerate amoxicillin    Sulfa Antibiotics Rash    Sulfamethoxazole-Trimethoprim Hives    Diclofenac Rash       Physical Exam:  There were no vitals filed for this visit.  General: Mary Nascimento is a healthy and well appearing 59 y.o. female who is sitting comfortably in our office in acute

## 2025-01-16 ENCOUNTER — TELEPHONE (OUTPATIENT)
Dept: ORTHOPEDIC SURGERY | Age: 60
End: 2025-01-16

## 2025-01-16 NOTE — TELEPHONE ENCOUNTER
Therapist would like to know if he can add blood flow restriction training for her biceps strengthening.  Can leave a voicemail answer for him.    [phone]

## 2025-01-28 ENCOUNTER — OFFICE VISIT (OUTPATIENT)
Dept: ORTHOPEDIC SURGERY | Age: 60
End: 2025-01-28

## 2025-01-28 VITALS — BODY MASS INDEX: 20.38 KG/M2 | HEIGHT: 63 IN | WEIGHT: 115 LBS

## 2025-01-28 DIAGNOSIS — Z98.890 S/P ROTATOR CUFF REPAIR: Primary | ICD-10-CM

## 2025-01-28 PROCEDURE — 99024 POSTOP FOLLOW-UP VISIT: CPT | Performed by: ORTHOPAEDIC SURGERY

## 2025-01-28 NOTE — PROGRESS NOTES
has internal rotation of 20.    Strength:  External Rotation 4/5, Internal Rotation +4/5, Champagne Toast -4/5, Supraspinatus -4/5    Special Tests:   No Elijah muscle deformity.    Neurovascular: Sensation to light touch is intact, no motor deficits, palpable radial pulses 2+      Radiology:     No new XR obtained at this time.         Assessment :  Ms. Mary Nascimento is a pleasant, 60 y.o. patient who is undergoing a right arthroscopic rotator cuff repair and open subpectoral biceps tenodesis.  Surgery was on 11/19/2024 and she continues to make good progress.      Impression:  Encounter Diagnosis   Name Primary?    S/P rotator cuff repair Yes       Office Procedures:  Orders Placed This Encounter   Procedures    Amb External Referral To Physical Therapy     Referral Priority:   Routine     Referral Type:   Consult for Advice and Opinion     Referral Reason:   Patient Preference     Requested Specialty:   Physical Therapy, Orthopedic     Number of Visits Requested:   1       Treatment Plan:  . We recommend Mary Nascimento continue in physical therapy. A new physical therapy letter was documented in [facility] today.  We will continue to keep her off of work at this time she may continue to use FMLA.  Patient should continue to hold off on any overhead lifting.  The patient may start to carry light objects ground to waist.  She should transition to full active range of motion and continue working on the strength program.    We will see Mary back in 4 weeks and/or as needed. All questions were answered to patient's satisfaction and She was encouraged to call with any further questions or concerns. Mary Nascimento is in agreement with this plan.    1/31/2025  8:44 AM    Sushma Nagar, PA-C  Orthopaedic Sports Medicine Physician Assistant    During this examination, I, Sushma Nagar, PA-C, functioned as a scribe for Dr. Oscar Alonso.     This dictation was performed with a verbal recognition program (DRAGON) and it

## 2025-02-13 ENCOUNTER — OFFICE VISIT (OUTPATIENT)
Dept: ORTHOPEDIC SURGERY | Age: 60
End: 2025-02-13

## 2025-02-13 VITALS — WEIGHT: 110 LBS | BODY MASS INDEX: 19.49 KG/M2 | HEIGHT: 63 IN

## 2025-02-13 DIAGNOSIS — Z96.652 STATUS POST LEFT PARTIAL KNEE REPLACEMENT: Primary | ICD-10-CM

## 2025-02-13 NOTE — PROGRESS NOTES
[facility]  Office Visit    Chief Complaint: Left knee pain and swelling with history of medial compartment arthroplasty    HPI:  Mary Nascimento is a 60 y.o. who is here for initial evaluation of left knee pain and swelling that began about 3 weeks ago.  She has a history of left knee medial compartment arthroplasty with me in August 2023.  She has remained active with walking, biking, running.  There is no inciting event when her knee began to feel more full and stiff 3 weeks ago.  She reports swelling around the knee.  She is still able to be active and walks without assistive device.  She denies fevers or chills.      Past Medical History:   Diagnosis Date    ADD (attention deficit disorder) 09/11/2017    Allergic rhinitis 08/28/2010    Anisocoria     per pt \"misdiagnosis\", per dermatologist    Anxiety and depression     Deviated septum     Elevated homocysteine 01/30/2016    Essential hypertension 05/02/2019    Irritable bowel syndrome 08/28/2010    Prolonged emergence from general anesthesia     after sinus surgery    Wears glasses     driving        ROS:  Constitutional: denies fever, chills, weight loss  MSK: denies pain in other joints, muscle aches  Neurological: denies numbness, tingling, weakness    Exam:  Ht 1.6 m (5' 3\")   Wt 49.9 kg (110 lb)   BMI 19.49 kg/m²      Appearance: sitting in exam room chair, appears to be in no acute distress, awake and alert  Resp: unlabored breathing on room air  Skin: warm, dry and intact with out erythema or significant increased temperature  Neuro: grossly intact both lower extremities. Intact sensation to light touch. Motor exam 4+ to 5/5 in all major motor groups.  LLE: Well-healed anterior midline incision over the knee.  There is a small knee joint effusion.  She demonstrates active knee range of motion 0 to 120 degrees.  Stable to varus and valgus stress at full extension and to anterior and posterior drawer at 90 degrees of

## 2025-03-10 ENCOUNTER — OFFICE VISIT (OUTPATIENT)
Dept: ORTHOPEDIC SURGERY | Age: 60
End: 2025-03-10
Payer: COMMERCIAL

## 2025-03-10 VITALS — BODY MASS INDEX: 19.49 KG/M2 | WEIGHT: 110 LBS | HEIGHT: 63 IN

## 2025-03-10 DIAGNOSIS — Z98.890 S/P ROTATOR CUFF REPAIR: Primary | ICD-10-CM

## 2025-03-10 PROCEDURE — 1036F TOBACCO NON-USER: CPT | Performed by: PHYSICIAN ASSISTANT

## 2025-03-10 PROCEDURE — 3017F COLORECTAL CA SCREEN DOC REV: CPT | Performed by: PHYSICIAN ASSISTANT

## 2025-03-10 PROCEDURE — 99213 OFFICE O/P EST LOW 20 MIN: CPT | Performed by: PHYSICIAN ASSISTANT

## 2025-03-10 PROCEDURE — G8420 CALC BMI NORM PARAMETERS: HCPCS | Performed by: PHYSICIAN ASSISTANT

## 2025-03-10 PROCEDURE — G8427 DOCREV CUR MEDS BY ELIG CLIN: HCPCS | Performed by: PHYSICIAN ASSISTANT

## 2025-03-10 NOTE — PROGRESS NOTES
Chief Complaint    Shoulder Pain (RIGHT SHOULDER)      History of Present Illness:  Mary Nascimento is a pleasant, 60 y.o., female, here today for follow up of right shoulder.  She has continued in physical therapy at an outside location and feels that it has been going well.  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  Patient is concerned about the appearence of the right  biceps at this time.  She denies pain.  She is working on the strength program with her physical therapist.  She is realizing that it is a gradual process.  She reports no new injuries or setbacks.     Pain Assessment  Location of Pain: Shoulder  Location Modifiers: Right  Severity of Pain: 0  Quality of Pain: Throbbing, Sharp, Dull, Aching  Frequency of Pain: Intermittent  Aggravating Factors: Other (Comment), Exercise, Straightening, Stretching  Limiting Behavior: Some  Relieving Factors: Rest, Exercise, Ice, Other (Comment)  Result of Injury: Yes  Work-Related Injury: No  Are there other pain locations you wish to document?: No      Medical History:  Patient's medications, allergies, past medical, surgical, social and family histories were reviewed and updated as appropriate.        Review of Systems  A 14 point review of systems was completed by the patient and is available in the media section of the scanned medical record and was reviewed on 3/10/2025.  The review is negative with the exception of those things mentioned in the HPI and Past Medical History    Vital Signs:  There were no vitals filed for this visit.    General/Appearance: Alert and oriented and in no apparent distress.    Skin:  There are no skin lesions, cellulitis, or extreme edema. The patient has warm and well-perfused Bilateral upper extremities with brisk capillary refill.      right Shoulder Exam:  Inspection:  No gross deformities, no signs of infection.    Palpation:  no tenderness.    Active Range of Motion:  Forward Elevation 180,

## 2025-05-21 ENCOUNTER — OFFICE VISIT (OUTPATIENT)
Dept: ORTHOPEDIC SURGERY | Age: 60
End: 2025-05-21
Payer: COMMERCIAL

## 2025-05-21 DIAGNOSIS — Z96.652 STATUS POST LEFT PARTIAL KNEE REPLACEMENT: ICD-10-CM

## 2025-05-21 DIAGNOSIS — Z98.890 S/P ROTATOR CUFF REPAIR: Primary | ICD-10-CM

## 2025-05-21 PROCEDURE — G8420 CALC BMI NORM PARAMETERS: HCPCS

## 2025-05-21 PROCEDURE — 3017F COLORECTAL CA SCREEN DOC REV: CPT

## 2025-05-21 PROCEDURE — 99213 OFFICE O/P EST LOW 20 MIN: CPT

## 2025-05-21 PROCEDURE — G8427 DOCREV CUR MEDS BY ELIG CLIN: HCPCS

## 2025-05-21 PROCEDURE — 1036F TOBACCO NON-USER: CPT

## 2025-05-21 NOTE — PROGRESS NOTES
Chief Complaint    Shoulder Pain      History of Present Illness:  Mary Nascimento is a pleasant, 60 y.o., female, here today for follow up of right shoulder.  She is 6 months s/p  right arthroscopic rotator cuff repair and open subpectoral biceps tenodesis.  Surgery was on 11/19/2024.  Overall she has been doing very well.  Her pain has been well-controlled.  She has been very pleased with her range of motion and strength.  She continues to with  physical therapy at an outside location and feels that it has been going well.  She is working on the strength program with her physical therapist.  She reports no new injuries or setbacks.       Medical History:  Patient's medications, allergies, past medical, surgical, social and family histories were reviewed and updated as appropriate.        Review of Systems  A 14 point review of systems was completed by the patient and is available in the media section of the scanned medical record and was reviewed on 5/21/2025.  The review is negative with the exception of those things mentioned in the HPI and Past Medical History    Vital Signs:  There were no vitals filed for this visit.    General/Appearance: Alert and oriented and in no apparent distress.    Skin:  There are no skin lesions, cellulitis, or extreme edema. The patient has warm and well-perfused Bilateral upper extremities with brisk capillary refill.      right Shoulder Exam:  Inspection:  No gross deformities, no signs of infection.    Palpation:  no tenderness.    Active Range of Motion:  Forward Elevation 180, duction of 180, external rotation of 50 and internal rotation to T8 versus T4 on the left    Passive Range of Motion: Forward Elevation 180    Strength:  External Rotation 4+/5, Internal Rotation +4/5, Champagne Toast 4+/5, Supraspinatus 4+/5    Special Tests:   No Elijah muscle deformity.    Neurovascular: Sensation to light touch is intact, no motor deficits, palpable radial pulses

## (undated) DEVICE — SUTURE ABSORBABLE MONOFILAMENT 1-0 OS8 14 IN STRATAFIX SPRL SXPD2B202

## (undated) DEVICE — GOWN,REINFRCE,POLY,ECLIPSE,SLV,3XLG: Brand: MEDLINE

## (undated) DEVICE — KIT INSTR W/ 2.4MM GUIDEPIN SUT PASS WIRE NO2 FIBERWIRE

## (undated) DEVICE — Device: Brand: ACCUMIX® MIXING SYSTEM

## (undated) DEVICE — MAT FLR FLD ASPIR DMND

## (undated) DEVICE — TOWEL,OR,DSP,ST,BLUE,STD,4/PK,20PK/CS: Brand: MEDLINE

## (undated) DEVICE — PACK PROCEDURE SURG ARTHROSCOPY

## (undated) DEVICE — BLADE SHV L13CM DIA4MM TAPR TIP SCIS LIKE CUT OVL OUTER

## (undated) DEVICE — APPLICATOR PREP 26ML 0.7% IOD POVACRYLEX 74% ISO ALC ST

## (undated) DEVICE — SKIN AFFIX SURG ADHESIVE 72/CS 0.55ML: Brand: MEDLINE

## (undated) DEVICE — CORD RETRCT SIL

## (undated) DEVICE — DEVICE GRFT FIX 4.75X19.1 MM ANCHR IMPL BIOCOMP SWIVELOCK
Type: IMPLANTABLE DEVICE | Site: SHOULDER | Status: NON-FUNCTIONAL
Removed: 2024-11-19

## (undated) DEVICE — TUBING PMP L16FT MAIN DISP FOR AR-6400 AR-6475 Â€“ ORDER MULTIPLES OF 10 EACH

## (undated) DEVICE — SUTURE STRATAFIX SPRL SZ 2 0 L14IN ABSRB UD MH L36MM 1 2 CIR SXMD2B401

## (undated) DEVICE — COVER,MAYO STAND,XL,STERILE: Brand: MEDLINE

## (undated) DEVICE — KIT SUBCHONDROPLASTY PROC SIDE TARGETED ACCUPORT

## (undated) DEVICE — SUTURE VICRYL + SZ 3-0 L27IN ABSRB UD CT-2 L26MM 1/2 CIR TAPR VCP232H

## (undated) DEVICE — KIT TRK KNEE PROC VIZADISC

## (undated) DEVICE — TOTAL KNEE: Brand: MEDLINE INDUSTRIES, INC.

## (undated) DEVICE — COVER,TABLE,HEAVY DUTY,77"X90",STRL: Brand: MEDLINE

## (undated) DEVICE — SOLUTION PREP PAINT POV IOD FOR SKIN MUCOUS MEM

## (undated) DEVICE — NEPTUNE E-SEP SMOKE EVACUATION PENCIL, COATED, 70MM BLADE, PUSH BUTTON SWITCH: Brand: NEPTUNE E-SEP

## (undated) DEVICE — TOWEL,STOP FLAG GOLD N-W: Brand: MEDLINE

## (undated) DEVICE — SOLUTION IV 1000ML 0.9% SOD CHL FOR IRRIG PLAS CONT

## (undated) DEVICE — HYPODERMIC SAFETY NEEDLE: Brand: MONOJECT

## (undated) DEVICE — COVER LT HNDL BLU PLAS

## (undated) DEVICE — SHOULDER STABILIZATION KIT,                                    DISPOSABLE 12 PER BOX

## (undated) DEVICE — SUTURE ABSORBABLE MONOFILAMENT 1-0 CT1 27 IN VIO PDS + PDP341H

## (undated) DEVICE — ADHESIVE SKIN CLOSURE XL 42 CM 2.7 CC MESH LIQUIBAND SECUR

## (undated) DEVICE — KIT DRP FOR RIO ROBOTIC ARM ASST SYS

## (undated) DEVICE — TUBING PMP L16FT MAIN DISP FOR AR-6400 AR-6475

## (undated) DEVICE — SURE SET-DOUBLE BASIN-LF: Brand: MEDLINE INDUSTRIES, INC.

## (undated) DEVICE — GLOVE SURG SZ 85 L12IN FNGR THK13MIL BRN LTX SYN POLYMER W

## (undated) DEVICE — GLOVE SURG SZ 8 L12IN FNGR THK79MIL GRN LTX FREE

## (undated) DEVICE — SUTURE MONOCRYL + SZ 4-0 L27IN ABSRB UD L19MM PS-2 3/8 CIR MCP426H

## (undated) DEVICE — SUTURE STRATAFIX SPRL SZ 2 0 L14IN ABSRB UD MH L36MM 1 2 CIR SXMP2B401

## (undated) DEVICE — SHOULDER ARTHROSCOPY: Brand: MEDLINE INDUSTRIES, INC.

## (undated) DEVICE — SUTURE MCRYL SZ 4-0 L18IN ABSRB UD L19MM PS-2 3/8 CIR PRIM Y496G

## (undated) DEVICE — SUTURE VCRL + SZ 1 L18IN ABSRB UD L36MM CT-1 1/2 CIR VCP841D

## (undated) DEVICE — NEEDLE HYPO 22GA L1.5IN BLK POLYPR HUB S STL REG BVL STR

## (undated) DEVICE — MAT FLR W32XL58IN

## (undated) DEVICE — SPONGE GZ W4XL8IN COT WVN 12 PLY

## (undated) DEVICE — SUTURE STRATAFIX SPRL SZ 3-0 L12IN ABSRB UD FS-1 L30X30CM SXMP2B410

## (undated) DEVICE — SCREW INTRF L15MM DIA4.75MM W/ SZ 2 FIBERWIRE SUT AND DISP: Type: IMPLANTABLE DEVICE | Site: SHOULDER | Status: NON-FUNCTIONAL

## (undated) DEVICE — DRAPE,ORTHOMAX,EXTREMITY: Brand: MEDLINE

## (undated) DEVICE — TURNOVER KIT RM INF CTRL TECH

## (undated) DEVICE — SOLUTION IV IRRIG LACTATED RINGERS 3000ML 2B7487

## (undated) DEVICE — ANCHOR SUT L14MM DIA4.5MM W/ FULL THRD BIOCOMP HNDL INSRTR
Type: IMPLANTABLE DEVICE | Site: SHOULDER | Status: NON-FUNCTIONAL
Removed: 2024-11-19

## (undated) DEVICE — SYRINGE MED 30ML STD CLR PLAS LUERLOCK TIP N CTRL DISP

## (undated) DEVICE — 3M™ IOBAN™ 2 ANTIMICROBIAL INCISE DRAPE 6640EZ: Brand: IOBAN™ 2

## (undated) DEVICE — ADHESIVE SKIN CLOSURE WND 8.661X1.5 IN 22 CM LIQUIBAND SECUR

## (undated) DEVICE — GLOVE ORANGE PI 8   MSG9080

## (undated) DEVICE — PADDING CAST W6INXL4YD ST COT RAYON MICROPLEATED HIGHLY

## (undated) DEVICE — CANNULA NSL AD TBNG L7FT PVC STR NONFLARED PRNG O2 DEL W STD

## (undated) DEVICE — SUTURE FIBERWIRE SZ 2 L38IN WHT BLK L26.5MM 1/2 CIR TAPR AR7205T

## (undated) DEVICE — BOOT POS LEG DEMAYO

## (undated) DEVICE — GOWN,SIRUS,POLYRNF,BRTHSLV,XLN/XXL,18/CS: Brand: MEDLINE

## (undated) DEVICE — 1010 S-DRAPE TOWEL DRAPE 10/BX: Brand: STERI-DRAPE™

## (undated) DEVICE — AWL TAPERED 3.8MM DISPOSABLE: Brand: HEALICOIL

## (undated) DEVICE — ZIMMER® STERILE DISPOSABLE TOURNIQUET CUFF WITH PLC, DUAL PORT, SINGLE BLADDER, 34 IN. (86 CM)

## (undated) DEVICE — UNDERGLOVE SURG SZ 8 BLU LTX FREE SYN POLYISOPRENE POLYMER

## (undated) DEVICE — GLOVE SURG SZ 85 L12IN FNGR THK87MIL DK GRN LTX FREE ISOLEX

## (undated) DEVICE — BANDAGE COMPR W6INXL15YD WHT BGE POLY COT WV E HK LOOP CLSR

## (undated) DEVICE — TUBING PMP L6FT CONT WAVE EXTN

## (undated) DEVICE — BLADE SHV L13CM DIA5MM EXCALIBUR AGG COOLCUT

## (undated) DEVICE — GOWN,SIRUS,POLYRNF,SETINSLV,L,20/CS: Brand: MEDLINE

## (undated) DEVICE — PIN BNE FIX TEMP L140MM DIA4MM MAKO

## (undated) DEVICE — CANNULA ARTHSCP L3CM ID8MM DBL DAM 1 PC MOLD LO PROF FLNG

## (undated) DEVICE — KIT INT FIX FEM TIB CKPT MAKOPLASTY

## (undated) DEVICE — MERCY HEALTH WEST TURNOVER: Brand: MEDLINE INDUSTRIES, INC.

## (undated) DEVICE — PADDING CAST W6INXL4YD COT LO LINTING WYTEX

## (undated) DEVICE — PAD DRY FLOOR ABS 32X58IN GRN

## (undated) DEVICE — PIN BNE FIX L110MM DIA32MM

## (undated) DEVICE — GOWN,SIRUS,POLYRNF,BRTHSLV,XL,30/CS: Brand: MEDLINE

## (undated) DEVICE — ELECTRODE PT RET AD L9FT HI MOIST COND ADH HYDRGEL CORDED

## (undated) DEVICE — MATTRESS MAXI AIR TRNSF SGL PT USE DCS 37 45 48 52 75

## (undated) DEVICE — ABLATOR ENDOSCOPIC ELECTROCAUTERY 90 DEG RF ASPIRATING STERILE DISPOSABLE APOLLORF I90

## (undated) DEVICE — DUAL CUT SAGITTAL BLADE

## (undated) DEVICE — NEEDLE SUT T-5 L26.5MM 1/2 CIR TAPR W/ NIT LOOP

## (undated) DEVICE — GLOVE ORTHO 8   MSG9480

## (undated) DEVICE — BASIC SINGLE BASIN 1-LF: Brand: MEDLINE INDUSTRIES, INC.

## (undated) DEVICE — CANNULA ARTHSCP L7CM DIA7MM TRNSLUC THRD FLX W/ NO SQUIRT

## (undated) DEVICE — PAD,NON-ADHERENT,3X8,STERILE,LF,1/PK: Brand: MEDLINE

## (undated) DEVICE — GOWN SIRUS NONREIN XL W/TWL: Brand: MEDLINE INDUSTRIES, INC.

## (undated) DEVICE — BUR SHAVER 5 MMX13 CM 8 FLUT OVL FOR AGGRESSIVE BNE COOLCUT

## (undated) DEVICE — TUBING FLD MGMT Y DBL SPIK DUALWAVE

## (undated) DEVICE — BANDAGE COMPR W6INXL15FT WHT E SGL LAYERED VELC CLSR